# Patient Record
Sex: FEMALE | Race: WHITE | Employment: OTHER | URBAN - NONMETROPOLITAN AREA
[De-identification: names, ages, dates, MRNs, and addresses within clinical notes are randomized per-mention and may not be internally consistent; named-entity substitution may affect disease eponyms.]

---

## 2018-01-18 ENCOUNTER — APPOINTMENT (OUTPATIENT)
Dept: GENERAL RADIOLOGY | Age: 58
End: 2018-01-18
Payer: MEDICARE

## 2018-01-18 ENCOUNTER — HOSPITAL ENCOUNTER (EMERGENCY)
Age: 58
Discharge: HOME | End: 2018-01-18
Attending: EMERGENCY MEDICINE
Payer: MEDICARE

## 2018-01-18 VITALS
HEART RATE: 75 BPM | DIASTOLIC BLOOD PRESSURE: 84 MMHG | OXYGEN SATURATION: 96 % | SYSTOLIC BLOOD PRESSURE: 107 MMHG | RESPIRATION RATE: 11 BRPM | TEMPERATURE: 98.1 F

## 2018-01-18 DIAGNOSIS — K59.00 CONSTIPATION, UNSPECIFIED CONSTIPATION TYPE: ICD-10-CM

## 2018-01-18 DIAGNOSIS — N30.00 ACUTE CYSTITIS WITHOUT HEMATURIA: Primary | ICD-10-CM

## 2018-01-18 LAB
-: ABNORMAL
ALBUMIN SERPL-MCNC: 4.3 G/DL (ref 3.5–5.2)
ALBUMIN/GLOBULIN RATIO: 1.3 (ref 1–2.5)
ALP BLD-CCNC: 77 U/L (ref 35–104)
ALT SERPL-CCNC: 10 U/L (ref 5–33)
AMORPHOUS: ABNORMAL
ANION GAP SERPL CALCULATED.3IONS-SCNC: 14 MMOL/L (ref 9–17)
AST SERPL-CCNC: 12 U/L
BACTERIA: ABNORMAL
BILIRUB SERPL-MCNC: 0.19 MG/DL (ref 0.3–1.2)
BILIRUBIN URINE: NEGATIVE
BUN BLDV-MCNC: 33 MG/DL (ref 6–20)
BUN/CREAT BLD: 34 (ref 9–20)
CALCIUM SERPL-MCNC: 9.6 MG/DL (ref 8.6–10.4)
CASTS UA: ABNORMAL /LPF
CHLORIDE BLD-SCNC: 95 MMOL/L (ref 98–107)
CO2: 29 MMOL/L (ref 20–31)
COLOR: YELLOW
COMMENT UA: ABNORMAL
CREAT SERPL-MCNC: 0.98 MG/DL (ref 0.5–0.9)
CRYSTALS, UA: ABNORMAL /HPF
EKG ATRIAL RATE: 73 BPM
EKG P AXIS: 53 DEGREES
EKG P-R INTERVAL: 132 MS
EKG Q-T INTERVAL: 390 MS
EKG QRS DURATION: 90 MS
EKG QTC CALCULATION (BAZETT): 429 MS
EKG R AXIS: 13 DEGREES
EKG T AXIS: 36 DEGREES
EKG VENTRICULAR RATE: 73 BPM
EPITHELIAL CELLS UA: ABNORMAL /HPF (ref 0–25)
GFR AFRICAN AMERICAN: >60 ML/MIN
GFR NON-AFRICAN AMERICAN: 59 ML/MIN
GFR SERPL CREATININE-BSD FRML MDRD: ABNORMAL ML/MIN/{1.73_M2}
GFR SERPL CREATININE-BSD FRML MDRD: ABNORMAL ML/MIN/{1.73_M2}
GLUCOSE BLD-MCNC: 232 MG/DL (ref 70–99)
GLUCOSE URINE: NEGATIVE
HCT VFR BLD CALC: 42.4 % (ref 36–46)
HEMOGLOBIN: 14 G/DL (ref 12–16)
KETONES, URINE: NEGATIVE
LEUKOCYTE ESTERASE, URINE: ABNORMAL
MCH RBC QN AUTO: 29.1 PG (ref 26–34)
MCHC RBC AUTO-ENTMCNC: 33.1 G/DL (ref 31–37)
MCV RBC AUTO: 87.9 FL (ref 80–100)
MUCUS: ABNORMAL
NITRITE, URINE: POSITIVE
NRBC AUTOMATED: NORMAL PER 100 WBC
OTHER OBSERVATIONS UA: ABNORMAL
PDW BLD-RTO: 14.2 % (ref 12.1–15.2)
PH UA: 6 (ref 5–9)
PLATELET # BLD: 196 K/UL (ref 140–450)
PMV BLD AUTO: 10.9 FL (ref 6–12)
POTASSIUM SERPL-SCNC: 3.9 MMOL/L (ref 3.7–5.3)
PROTEIN UA: NEGATIVE
RBC # BLD: 4.82 M/UL (ref 4–5.2)
RBC UA: ABNORMAL /HPF (ref 0–2)
RENAL EPITHELIAL, UA: ABNORMAL /HPF
SODIUM BLD-SCNC: 138 MMOL/L (ref 135–144)
SPECIFIC GRAVITY UA: 1.01 (ref 1.01–1.02)
TOTAL PROTEIN: 7.5 G/DL (ref 6.4–8.3)
TRICHOMONAS: ABNORMAL
TROPONIN INTERP: NORMAL
TROPONIN T: <0.03 NG/ML
TURBIDITY: ABNORMAL
URINE HGB: NEGATIVE
UROBILINOGEN, URINE: NORMAL
WBC # BLD: 7.8 K/UL (ref 3.5–11)
WBC UA: ABNORMAL /HPF (ref 0–5)
YEAST: ABNORMAL

## 2018-01-18 PROCEDURE — 87186 SC STD MICRODIL/AGAR DIL: CPT

## 2018-01-18 PROCEDURE — 6370000000 HC RX 637 (ALT 250 FOR IP): Performed by: EMERGENCY MEDICINE

## 2018-01-18 PROCEDURE — 85027 COMPLETE CBC AUTOMATED: CPT

## 2018-01-18 PROCEDURE — 99284 EMERGENCY DEPT VISIT MOD MDM: CPT

## 2018-01-18 PROCEDURE — 93005 ELECTROCARDIOGRAM TRACING: CPT

## 2018-01-18 PROCEDURE — 74022 RADEX COMPL AQT ABD SERIES: CPT

## 2018-01-18 PROCEDURE — 87086 URINE CULTURE/COLONY COUNT: CPT

## 2018-01-18 PROCEDURE — 81001 URINALYSIS AUTO W/SCOPE: CPT

## 2018-01-18 PROCEDURE — 36415 COLL VENOUS BLD VENIPUNCTURE: CPT

## 2018-01-18 PROCEDURE — 80053 COMPREHEN METABOLIC PANEL: CPT

## 2018-01-18 PROCEDURE — 87077 CULTURE AEROBIC IDENTIFY: CPT

## 2018-01-18 PROCEDURE — 84484 ASSAY OF TROPONIN QUANT: CPT

## 2018-01-18 PROCEDURE — 96374 THER/PROPH/DIAG INJ IV PUSH: CPT

## 2018-01-18 PROCEDURE — 6360000002 HC RX W HCPCS: Performed by: EMERGENCY MEDICINE

## 2018-01-18 RX ORDER — CEPHALEXIN 500 MG/1
500 CAPSULE ORAL ONCE
Status: COMPLETED | OUTPATIENT
Start: 2018-01-18 | End: 2018-01-18

## 2018-01-18 RX ORDER — CEPHALEXIN 500 MG/1
500 CAPSULE ORAL 3 TIMES DAILY
Qty: 21 CAPSULE | Refills: 0 | Status: SHIPPED | OUTPATIENT
Start: 2018-01-18 | End: 2018-04-08 | Stop reason: ALTCHOICE

## 2018-01-18 RX ORDER — ONDANSETRON 2 MG/ML
4 INJECTION INTRAMUSCULAR; INTRAVENOUS ONCE
Status: COMPLETED | OUTPATIENT
Start: 2018-01-18 | End: 2018-01-18

## 2018-01-18 RX ADMIN — ONDANSETRON 4 MG: 2 INJECTION INTRAMUSCULAR; INTRAVENOUS at 06:59

## 2018-01-18 RX ADMIN — CEPHALEXIN 500 MG: 500 CAPSULE ORAL at 07:52

## 2018-01-18 ASSESSMENT — ENCOUNTER SYMPTOMS
RESPIRATORY NEGATIVE: 1
CONSTIPATION: 1
ABDOMINAL DISTENTION: 1

## 2018-01-18 ASSESSMENT — PAIN SCALES - GENERAL: PAINLEVEL_OUTOF10: 2

## 2018-01-19 LAB
CULTURE: ABNORMAL
CULTURE: ABNORMAL
Lab: ABNORMAL
Lab: ABNORMAL
ORGANISM: ABNORMAL
SPECIMEN DESCRIPTION: ABNORMAL
SPECIMEN DESCRIPTION: ABNORMAL
STATUS: ABNORMAL

## 2018-04-08 ENCOUNTER — HOSPITAL ENCOUNTER (EMERGENCY)
Age: 58
Discharge: ANOTHER HOSPITAL | End: 2018-04-08
Attending: EMERGENCY MEDICINE
Payer: MEDICARE

## 2018-04-08 ENCOUNTER — APPOINTMENT (OUTPATIENT)
Dept: GENERAL RADIOLOGY | Age: 58
End: 2018-04-08
Payer: MEDICARE

## 2018-04-08 ENCOUNTER — HOSPITAL ENCOUNTER (INPATIENT)
Age: 58
LOS: 1 days | Discharge: HOME | DRG: 313 | End: 2018-04-09
Attending: INTERNAL MEDICINE | Admitting: INTERNAL MEDICINE
Payer: MEDICARE

## 2018-04-08 VITALS
SYSTOLIC BLOOD PRESSURE: 180 MMHG | TEMPERATURE: 98.5 F | WEIGHT: 182 LBS | RESPIRATION RATE: 13 BRPM | BODY MASS INDEX: 30.29 KG/M2 | DIASTOLIC BLOOD PRESSURE: 98 MMHG | HEART RATE: 72 BPM | OXYGEN SATURATION: 96 %

## 2018-04-08 DIAGNOSIS — R07.9 CHEST PAIN, UNSPECIFIED TYPE: Primary | ICD-10-CM

## 2018-04-08 LAB
ABSOLUTE EOS #: 0.41 K/UL (ref 0–0.44)
ABSOLUTE IMMATURE GRANULOCYTE: <0.03 K/UL (ref 0–0.3)
ABSOLUTE LYMPH #: 0.84 K/UL (ref 1.1–3.7)
ABSOLUTE MONO #: 0.53 K/UL (ref 0.1–1.2)
ALBUMIN SERPL-MCNC: 3.9 G/DL (ref 3.5–5.2)
ALBUMIN/GLOBULIN RATIO: 1.1 (ref 1–2.5)
ALP BLD-CCNC: 85 U/L (ref 35–104)
ALT SERPL-CCNC: 11 U/L (ref 5–33)
ANION GAP SERPL CALCULATED.3IONS-SCNC: 11 MMOL/L (ref 9–17)
AST SERPL-CCNC: 11 U/L
BASOPHILS # BLD: 1 % (ref 0–2)
BASOPHILS ABSOLUTE: 0.06 K/UL (ref 0–0.2)
BILIRUB SERPL-MCNC: 0.18 MG/DL (ref 0.3–1.2)
BUN BLDV-MCNC: 27 MG/DL (ref 6–20)
BUN/CREAT BLD: 23 (ref 9–20)
CALCIUM SERPL-MCNC: 9.3 MG/DL (ref 8.6–10.4)
CHLORIDE BLD-SCNC: 100 MMOL/L (ref 98–107)
CO2: 27 MMOL/L (ref 20–31)
CREAT SERPL-MCNC: 1.16 MG/DL (ref 0.5–0.9)
DIFFERENTIAL TYPE: ABNORMAL
EOSINOPHILS RELATIVE PERCENT: 6 % (ref 1–4)
GFR AFRICAN AMERICAN: 59 ML/MIN
GFR NON-AFRICAN AMERICAN: 48 ML/MIN
GFR SERPL CREATININE-BSD FRML MDRD: ABNORMAL ML/MIN/{1.73_M2}
GFR SERPL CREATININE-BSD FRML MDRD: ABNORMAL ML/MIN/{1.73_M2}
GLUCOSE BLD-MCNC: 186 MG/DL (ref 70–99)
GLUCOSE BLD-MCNC: 195 MG/DL (ref 65–105)
GLUCOSE BLD-MCNC: 217 MG/DL (ref 65–105)
HCT VFR BLD CALC: 42.1 % (ref 36.3–47.1)
HEMOGLOBIN: 13.6 G/DL (ref 11.9–15.1)
IMMATURE GRANULOCYTES: 0 %
LYMPHOCYTES # BLD: 12 % (ref 24–43)
MCH RBC QN AUTO: 29.1 PG (ref 25.2–33.5)
MCHC RBC AUTO-ENTMCNC: 32.3 G/DL (ref 28.4–34.8)
MCV RBC AUTO: 90 FL (ref 82.6–102.9)
MONOCYTES # BLD: 7 % (ref 3–12)
NRBC AUTOMATED: 0 PER 100 WBC
PDW BLD-RTO: 13.7 % (ref 11.8–14.4)
PLATELET # BLD: 206 K/UL (ref 138–453)
PLATELET ESTIMATE: ABNORMAL
PMV BLD AUTO: 12.4 FL (ref 8.1–13.5)
POTASSIUM SERPL-SCNC: 4.4 MMOL/L (ref 3.7–5.3)
RBC # BLD: 4.68 M/UL (ref 3.95–5.11)
RBC # BLD: ABNORMAL 10*6/UL
SEG NEUTROPHILS: 74 % (ref 36–65)
SEGMENTED NEUTROPHILS ABSOLUTE COUNT: 5.33 K/UL (ref 1.5–8.1)
SODIUM BLD-SCNC: 138 MMOL/L (ref 135–144)
TOTAL PROTEIN: 7.4 G/DL (ref 6.4–8.3)
TROPONIN INTERP: NORMAL
TROPONIN T: <0.03 NG/ML
WBC # BLD: 7.2 K/UL (ref 3.5–11.3)
WBC # BLD: ABNORMAL 10*3/UL

## 2018-04-08 PROCEDURE — 85025 COMPLETE CBC W/AUTO DIFF WBC: CPT

## 2018-04-08 PROCEDURE — 6370000000 HC RX 637 (ALT 250 FOR IP): Performed by: INTERNAL MEDICINE

## 2018-04-08 PROCEDURE — 84484 ASSAY OF TROPONIN QUANT: CPT

## 2018-04-08 PROCEDURE — 1200000000 HC SEMI PRIVATE

## 2018-04-08 PROCEDURE — 36415 COLL VENOUS BLD VENIPUNCTURE: CPT

## 2018-04-08 PROCEDURE — 99285 EMERGENCY DEPT VISIT HI MDM: CPT

## 2018-04-08 PROCEDURE — 96374 THER/PROPH/DIAG INJ IV PUSH: CPT

## 2018-04-08 PROCEDURE — 80053 COMPREHEN METABOLIC PANEL: CPT

## 2018-04-08 PROCEDURE — 82947 ASSAY GLUCOSE BLOOD QUANT: CPT

## 2018-04-08 PROCEDURE — 71045 X-RAY EXAM CHEST 1 VIEW: CPT

## 2018-04-08 PROCEDURE — 99222 1ST HOSP IP/OBS MODERATE 55: CPT | Performed by: INTERNAL MEDICINE

## 2018-04-08 PROCEDURE — 93005 ELECTROCARDIOGRAM TRACING: CPT

## 2018-04-08 PROCEDURE — 96376 TX/PRO/DX INJ SAME DRUG ADON: CPT

## 2018-04-08 PROCEDURE — 6370000000 HC RX 637 (ALT 250 FOR IP): Performed by: EMERGENCY MEDICINE

## 2018-04-08 PROCEDURE — 96375 TX/PRO/DX INJ NEW DRUG ADDON: CPT

## 2018-04-08 PROCEDURE — 6360000002 HC RX W HCPCS: Performed by: EMERGENCY MEDICINE

## 2018-04-08 RX ORDER — MORPHINE SULFATE 4 MG/ML
4 INJECTION, SOLUTION INTRAMUSCULAR; INTRAVENOUS ONCE
Status: COMPLETED | OUTPATIENT
Start: 2018-04-08 | End: 2018-04-08

## 2018-04-08 RX ORDER — NITROGLYCERIN 0.4 MG/1
0.4 TABLET SUBLINGUAL EVERY 5 MIN PRN
Status: DISCONTINUED | OUTPATIENT
Start: 2018-04-08 | End: 2018-04-09 | Stop reason: HOSPADM

## 2018-04-08 RX ORDER — ORPHENADRINE CITRATE 30 MG/ML
60 INJECTION INTRAMUSCULAR; INTRAVENOUS ONCE
Status: COMPLETED | OUTPATIENT
Start: 2018-04-08 | End: 2018-04-08

## 2018-04-08 RX ORDER — ONDANSETRON 2 MG/ML
4 INJECTION INTRAMUSCULAR; INTRAVENOUS EVERY 6 HOURS PRN
Status: DISCONTINUED | OUTPATIENT
Start: 2018-04-08 | End: 2018-04-09 | Stop reason: HOSPADM

## 2018-04-08 RX ORDER — DOCUSATE SODIUM 100 MG/1
100 CAPSULE, LIQUID FILLED ORAL 2 TIMES DAILY
Status: DISCONTINUED | OUTPATIENT
Start: 2018-04-08 | End: 2018-04-09 | Stop reason: HOSPADM

## 2018-04-08 RX ORDER — NICOTINE POLACRILEX 4 MG
15 LOZENGE BUCCAL PRN
Status: DISCONTINUED | OUTPATIENT
Start: 2018-04-08 | End: 2018-04-09 | Stop reason: HOSPADM

## 2018-04-08 RX ORDER — POTASSIUM CHLORIDE 7.45 MG/ML
10 INJECTION INTRAVENOUS PRN
Status: DISCONTINUED | OUTPATIENT
Start: 2018-04-08 | End: 2018-04-09 | Stop reason: HOSPADM

## 2018-04-08 RX ORDER — POTASSIUM CHLORIDE 20 MEQ/1
40 TABLET, EXTENDED RELEASE ORAL PRN
Status: DISCONTINUED | OUTPATIENT
Start: 2018-04-08 | End: 2018-04-09 | Stop reason: HOSPADM

## 2018-04-08 RX ORDER — BISACODYL 10 MG
10 SUPPOSITORY, RECTAL RECTAL DAILY PRN
Status: DISCONTINUED | OUTPATIENT
Start: 2018-04-08 | End: 2018-04-09 | Stop reason: HOSPADM

## 2018-04-08 RX ORDER — ASPIRIN 325 MG
325 TABLET ORAL ONCE
Status: COMPLETED | OUTPATIENT
Start: 2018-04-08 | End: 2018-04-08

## 2018-04-08 RX ORDER — FUROSEMIDE 20 MG/1
20 TABLET ORAL
Status: DISCONTINUED | OUTPATIENT
Start: 2018-04-09 | End: 2018-04-09 | Stop reason: HOSPADM

## 2018-04-08 RX ORDER — MORPHINE SULFATE 2 MG/ML
2 INJECTION, SOLUTION INTRAMUSCULAR; INTRAVENOUS
Status: DISCONTINUED | OUTPATIENT
Start: 2018-04-08 | End: 2018-04-09 | Stop reason: HOSPADM

## 2018-04-08 RX ORDER — ATORVASTATIN CALCIUM 20 MG/1
20 TABLET, FILM COATED ORAL NIGHTLY
Status: DISCONTINUED | OUTPATIENT
Start: 2018-04-08 | End: 2018-04-09 | Stop reason: HOSPADM

## 2018-04-08 RX ORDER — SODIUM CHLORIDE 0.9 % (FLUSH) 0.9 %
10 SYRINGE (ML) INJECTION EVERY 12 HOURS SCHEDULED
Status: DISCONTINUED | OUTPATIENT
Start: 2018-04-08 | End: 2018-04-09 | Stop reason: HOSPADM

## 2018-04-08 RX ORDER — DEXTROSE MONOHYDRATE 50 MG/ML
100 INJECTION, SOLUTION INTRAVENOUS PRN
Status: DISCONTINUED | OUTPATIENT
Start: 2018-04-08 | End: 2018-04-09 | Stop reason: HOSPADM

## 2018-04-08 RX ORDER — ONDANSETRON 2 MG/ML
4 INJECTION INTRAMUSCULAR; INTRAVENOUS ONCE
Status: COMPLETED | OUTPATIENT
Start: 2018-04-08 | End: 2018-04-08

## 2018-04-08 RX ORDER — POTASSIUM CHLORIDE 20MEQ/15ML
40 LIQUID (ML) ORAL PRN
Status: DISCONTINUED | OUTPATIENT
Start: 2018-04-08 | End: 2018-04-09 | Stop reason: HOSPADM

## 2018-04-08 RX ORDER — HYDROCODONE BITARTRATE AND ACETAMINOPHEN 5; 325 MG/1; MG/1
1 TABLET ORAL EVERY 4 HOURS PRN
Status: DISCONTINUED | OUTPATIENT
Start: 2018-04-08 | End: 2018-04-09 | Stop reason: HOSPADM

## 2018-04-08 RX ORDER — DEXTROSE MONOHYDRATE 25 G/50ML
12.5 INJECTION, SOLUTION INTRAVENOUS PRN
Status: DISCONTINUED | OUTPATIENT
Start: 2018-04-08 | End: 2018-04-09 | Stop reason: HOSPADM

## 2018-04-08 RX ORDER — ACETAMINOPHEN 325 MG/1
650 TABLET ORAL EVERY 4 HOURS PRN
Status: DISCONTINUED | OUTPATIENT
Start: 2018-04-08 | End: 2018-04-09 | Stop reason: HOSPADM

## 2018-04-08 RX ORDER — SODIUM CHLORIDE 0.9 % (FLUSH) 0.9 %
10 SYRINGE (ML) INJECTION PRN
Status: DISCONTINUED | OUTPATIENT
Start: 2018-04-08 | End: 2018-04-09 | Stop reason: HOSPADM

## 2018-04-08 RX ORDER — BACLOFEN 10 MG/1
10 TABLET ORAL 4 TIMES DAILY PRN
Status: DISCONTINUED | OUTPATIENT
Start: 2018-04-08 | End: 2018-04-09 | Stop reason: HOSPADM

## 2018-04-08 RX ORDER — CYCLOBENZAPRINE HCL 10 MG
10 TABLET ORAL 3 TIMES DAILY PRN
Status: DISCONTINUED | OUTPATIENT
Start: 2018-04-08 | End: 2018-04-09 | Stop reason: HOSPADM

## 2018-04-08 RX ORDER — MAGNESIUM SULFATE 1 G/100ML
1 INJECTION INTRAVENOUS PRN
Status: DISCONTINUED | OUTPATIENT
Start: 2018-04-08 | End: 2018-04-09 | Stop reason: HOSPADM

## 2018-04-08 RX ORDER — MORPHINE SULFATE 4 MG/ML
4 INJECTION, SOLUTION INTRAMUSCULAR; INTRAVENOUS
Status: DISCONTINUED | OUTPATIENT
Start: 2018-04-08 | End: 2018-04-09 | Stop reason: HOSPADM

## 2018-04-08 RX ADMIN — DOCUSATE SODIUM 100 MG: 100 TABLET, FILM COATED ORAL at 20:55

## 2018-04-08 RX ADMIN — ONDANSETRON 4 MG: 2 INJECTION INTRAMUSCULAR; INTRAVENOUS at 08:56

## 2018-04-08 RX ADMIN — MORPHINE SULFATE 4 MG: 4 INJECTION, SOLUTION INTRAMUSCULAR; INTRAVENOUS at 08:57

## 2018-04-08 RX ADMIN — HYDROCODONE BITARTRATE AND ACETAMINOPHEN 1 TABLET: 5; 325 TABLET ORAL at 20:55

## 2018-04-08 RX ADMIN — INSULIN LISPRO 2 UNITS: 100 INJECTION, SOLUTION INTRAVENOUS; SUBCUTANEOUS at 17:21

## 2018-04-08 RX ADMIN — INSULIN LISPRO 1 UNITS: 100 INJECTION, SOLUTION INTRAVENOUS; SUBCUTANEOUS at 20:59

## 2018-04-08 RX ADMIN — MORPHINE SULFATE 4 MG: 4 INJECTION, SOLUTION INTRAMUSCULAR; INTRAVENOUS at 11:14

## 2018-04-08 RX ADMIN — ONDANSETRON 4 MG: 2 INJECTION INTRAMUSCULAR; INTRAVENOUS at 11:10

## 2018-04-08 RX ADMIN — ASPIRIN 325 MG: 325 TABLET, COATED ORAL at 09:10

## 2018-04-08 RX ADMIN — CYCLOBENZAPRINE 10 MG: 10 TABLET, FILM COATED ORAL at 18:49

## 2018-04-08 RX ADMIN — HYDROCODONE BITARTRATE AND ACETAMINOPHEN 1 TABLET: 5; 325 TABLET ORAL at 14:25

## 2018-04-08 RX ADMIN — ORPHENADRINE CITRATE 60 MG: 30 INJECTION INTRAMUSCULAR; INTRAVENOUS at 09:10

## 2018-04-08 RX ADMIN — NITROGLYCERIN 0.5 INCH: 20 OINTMENT TOPICAL at 09:10

## 2018-04-08 RX ADMIN — ATORVASTATIN CALCIUM 20 MG: 20 TABLET, FILM COATED ORAL at 20:55

## 2018-04-08 ASSESSMENT — PAIN SCALES - GENERAL
PAINLEVEL_OUTOF10: 4
PAINLEVEL_OUTOF10: 9
PAINLEVEL_OUTOF10: 10
PAINLEVEL_OUTOF10: 8
PAINLEVEL_OUTOF10: 10
PAINLEVEL_OUTOF10: 8
PAINLEVEL_OUTOF10: 10
PAINLEVEL_OUTOF10: 8
PAINLEVEL_OUTOF10: 7

## 2018-04-08 ASSESSMENT — PAIN DESCRIPTION - LOCATION
LOCATION: ARM;CHEST
LOCATION: ARM;CHEST

## 2018-04-08 ASSESSMENT — ENCOUNTER SYMPTOMS
EYES NEGATIVE: 1
ALLERGIC/IMMUNOLOGIC NEGATIVE: 1
CHEST TIGHTNESS: 1
GASTROINTESTINAL NEGATIVE: 1

## 2018-04-08 ASSESSMENT — PAIN DESCRIPTION - PAIN TYPE
TYPE: ACUTE PAIN
TYPE: ACUTE PAIN

## 2018-04-08 ASSESSMENT — PAIN DESCRIPTION - ORIENTATION
ORIENTATION: LEFT
ORIENTATION: LEFT

## 2018-04-08 ASSESSMENT — PAIN DESCRIPTION - DESCRIPTORS: DESCRIPTORS: DISCOMFORT;ACHING;SHARP

## 2018-04-08 ASSESSMENT — PAIN DESCRIPTION - FREQUENCY: FREQUENCY: CONTINUOUS

## 2018-04-09 VITALS
HEIGHT: 65 IN | DIASTOLIC BLOOD PRESSURE: 67 MMHG | HEART RATE: 81 BPM | SYSTOLIC BLOOD PRESSURE: 108 MMHG | OXYGEN SATURATION: 95 % | TEMPERATURE: 98.8 F | BODY MASS INDEX: 32.96 KG/M2 | RESPIRATION RATE: 18 BRPM | WEIGHT: 197.8 LBS

## 2018-04-09 LAB
ALBUMIN SERPL-MCNC: 3.6 G/DL (ref 3.5–5.2)
ALBUMIN/GLOBULIN RATIO: 1.2 (ref 1–2.5)
ALP BLD-CCNC: 72 U/L (ref 35–104)
ALT SERPL-CCNC: 9 U/L (ref 5–33)
ANION GAP SERPL CALCULATED.3IONS-SCNC: 12 MMOL/L (ref 9–17)
AST SERPL-CCNC: 9 U/L
BILIRUB SERPL-MCNC: 0.36 MG/DL (ref 0.3–1.2)
BUN BLDV-MCNC: 18 MG/DL (ref 6–20)
BUN/CREAT BLD: ABNORMAL (ref 9–20)
CALCIUM SERPL-MCNC: 9.1 MG/DL (ref 8.6–10.4)
CHLORIDE BLD-SCNC: 98 MMOL/L (ref 98–107)
CHOLESTEROL/HDL RATIO: 2.2
CHOLESTEROL: 215 MG/DL
CO2: 26 MMOL/L (ref 20–31)
CREAT SERPL-MCNC: 0.89 MG/DL (ref 0.5–0.9)
EKG ATRIAL RATE: 71 BPM
EKG ATRIAL RATE: 79 BPM
EKG P AXIS: 75 DEGREES
EKG P AXIS: 77 DEGREES
EKG P-R INTERVAL: 130 MS
EKG P-R INTERVAL: 140 MS
EKG Q-T INTERVAL: 370 MS
EKG Q-T INTERVAL: 388 MS
EKG QRS DURATION: 78 MS
EKG QRS DURATION: 78 MS
EKG QTC CALCULATION (BAZETT): 421 MS
EKG QTC CALCULATION (BAZETT): 424 MS
EKG R AXIS: 0 DEGREES
EKG R AXIS: 7 DEGREES
EKG T AXIS: 13 DEGREES
EKG T AXIS: 27 DEGREES
EKG VENTRICULAR RATE: 71 BPM
EKG VENTRICULAR RATE: 79 BPM
GFR AFRICAN AMERICAN: >60 ML/MIN
GFR NON-AFRICAN AMERICAN: >60 ML/MIN
GFR SERPL CREATININE-BSD FRML MDRD: ABNORMAL ML/MIN/{1.73_M2}
GFR SERPL CREATININE-BSD FRML MDRD: ABNORMAL ML/MIN/{1.73_M2}
GLUCOSE BLD-MCNC: 140 MG/DL (ref 65–105)
GLUCOSE BLD-MCNC: 166 MG/DL (ref 65–105)
GLUCOSE BLD-MCNC: 176 MG/DL (ref 65–105)
GLUCOSE BLD-MCNC: 180 MG/DL (ref 70–99)
HCT VFR BLD CALC: 39.1 % (ref 36.3–47.1)
HDLC SERPL-MCNC: 100 MG/DL
HEMOGLOBIN: 12.1 G/DL (ref 11.9–15.1)
LDL CHOLESTEROL: 95 MG/DL (ref 0–130)
MAGNESIUM: 1.8 MG/DL (ref 1.6–2.6)
MCH RBC QN AUTO: 28.7 PG (ref 25.2–33.5)
MCHC RBC AUTO-ENTMCNC: 30.9 G/DL (ref 28.4–34.8)
MCV RBC AUTO: 92.9 FL (ref 82.6–102.9)
NRBC AUTOMATED: 0 PER 100 WBC
PDW BLD-RTO: 14 % (ref 11.8–14.4)
PLATELET # BLD: 206 K/UL (ref 138–453)
PMV BLD AUTO: 12.7 FL (ref 8.1–13.5)
POTASSIUM SERPL-SCNC: 4.5 MMOL/L (ref 3.7–5.3)
RBC # BLD: 4.21 M/UL (ref 3.95–5.11)
SODIUM BLD-SCNC: 136 MMOL/L (ref 135–144)
T3 FREE: 1.58 PG/ML (ref 2.02–4.43)
THYROXINE, FREE: 0.92 NG/DL (ref 0.93–1.7)
TOTAL PROTEIN: 6.6 G/DL (ref 6.4–8.3)
TRIGL SERPL-MCNC: 99 MG/DL
TSH SERPL DL<=0.05 MIU/L-ACNC: 25.36 MIU/L (ref 0.3–5)
VLDLC SERPL CALC-MCNC: ABNORMAL MG/DL (ref 1–30)
WBC # BLD: 7 K/UL (ref 3.5–11.3)

## 2018-04-09 PROCEDURE — 6370000000 HC RX 637 (ALT 250 FOR IP): Performed by: INTERNAL MEDICINE

## 2018-04-09 PROCEDURE — 84481 FREE ASSAY (FT-3): CPT

## 2018-04-09 PROCEDURE — 80061 LIPID PANEL: CPT

## 2018-04-09 PROCEDURE — 82947 ASSAY GLUCOSE BLOOD QUANT: CPT

## 2018-04-09 PROCEDURE — 83735 ASSAY OF MAGNESIUM: CPT

## 2018-04-09 PROCEDURE — 2580000003 HC RX 258: Performed by: INTERNAL MEDICINE

## 2018-04-09 PROCEDURE — 84443 ASSAY THYROID STIM HORMONE: CPT

## 2018-04-09 PROCEDURE — 99221 1ST HOSP IP/OBS SF/LOW 40: CPT | Performed by: PSYCHIATRY & NEUROLOGY

## 2018-04-09 PROCEDURE — 85027 COMPLETE CBC AUTOMATED: CPT

## 2018-04-09 PROCEDURE — 80053 COMPREHEN METABOLIC PANEL: CPT

## 2018-04-09 PROCEDURE — 94762 N-INVAS EAR/PLS OXIMTRY CONT: CPT

## 2018-04-09 PROCEDURE — 36415 COLL VENOUS BLD VENIPUNCTURE: CPT

## 2018-04-09 PROCEDURE — 99232 SBSQ HOSP IP/OBS MODERATE 35: CPT | Performed by: INTERNAL MEDICINE

## 2018-04-09 PROCEDURE — G0378 HOSPITAL OBSERVATION PER HR: HCPCS

## 2018-04-09 PROCEDURE — 84439 ASSAY OF FREE THYROXINE: CPT

## 2018-04-09 PROCEDURE — 6360000002 HC RX W HCPCS: Performed by: INTERNAL MEDICINE

## 2018-04-09 RX ORDER — LEVOTHYROXINE SODIUM 0.1 MG/1
200 TABLET ORAL DAILY
Status: DISCONTINUED | OUTPATIENT
Start: 2018-04-10 | End: 2018-04-09 | Stop reason: HOSPADM

## 2018-04-09 RX ORDER — LEVOTHYROXINE SODIUM 0.2 MG/1
200 TABLET ORAL DAILY
Qty: 30 TABLET | Refills: 1 | Status: ON HOLD | OUTPATIENT
Start: 2018-04-10 | End: 2022-05-29 | Stop reason: SDUPTHER

## 2018-04-09 RX ORDER — CYCLOBENZAPRINE HCL 10 MG
10 TABLET ORAL 3 TIMES DAILY PRN
Qty: 30 TABLET | Refills: 0 | Status: SHIPPED | OUTPATIENT
Start: 2018-04-09 | End: 2018-04-19

## 2018-04-09 RX ADMIN — INSULIN LISPRO 1 UNITS: 100 INJECTION, SOLUTION INTRAVENOUS; SUBCUTANEOUS at 09:52

## 2018-04-09 RX ADMIN — ASPIRIN 325 MG: 325 TABLET, COATED ORAL at 09:51

## 2018-04-09 RX ADMIN — ENOXAPARIN SODIUM 40 MG: 40 INJECTION SUBCUTANEOUS at 09:50

## 2018-04-09 RX ADMIN — CYCLOBENZAPRINE 10 MG: 10 TABLET, FILM COATED ORAL at 15:26

## 2018-04-09 RX ADMIN — DOCUSATE SODIUM 100 MG: 100 TABLET, FILM COATED ORAL at 09:30

## 2018-04-09 RX ADMIN — Medication 10 ML: at 09:54

## 2018-04-09 RX ADMIN — ATORVASTATIN CALCIUM 20 MG: 20 TABLET, FILM COATED ORAL at 20:39

## 2018-04-09 RX ADMIN — FUROSEMIDE 20 MG: 20 TABLET ORAL at 13:43

## 2018-04-09 RX ADMIN — HYDROCODONE BITARTRATE AND ACETAMINOPHEN 1 TABLET: 5; 325 TABLET ORAL at 06:15

## 2018-04-09 RX ADMIN — MAGNESIUM GLUCONATE 500 MG ORAL TABLET 1000 MG: 500 TABLET ORAL at 09:51

## 2018-04-09 RX ADMIN — LEVOTHYROXINE SODIUM 175 MCG: 125 TABLET ORAL at 09:50

## 2018-04-09 RX ADMIN — CYCLOBENZAPRINE 10 MG: 10 TABLET, FILM COATED ORAL at 20:40

## 2018-04-09 RX ADMIN — CYCLOBENZAPRINE 10 MG: 10 TABLET, FILM COATED ORAL at 06:15

## 2018-04-09 RX ADMIN — INSULIN LISPRO 1 UNITS: 100 INJECTION, SOLUTION INTRAVENOUS; SUBCUTANEOUS at 13:43

## 2018-04-09 ASSESSMENT — PAIN SCALES - GENERAL
PAINLEVEL_OUTOF10: 6
PAINLEVEL_OUTOF10: 0

## 2018-06-22 ENCOUNTER — APPOINTMENT (OUTPATIENT)
Dept: GENERAL RADIOLOGY | Age: 58
End: 2018-06-22
Payer: MEDICARE

## 2018-06-22 ENCOUNTER — HOSPITAL ENCOUNTER (OUTPATIENT)
Dept: NON INVASIVE DIAGNOSTICS | Age: 58
Discharge: HOME | End: 2018-06-22
Payer: MEDICARE

## 2018-06-22 ENCOUNTER — APPOINTMENT (OUTPATIENT)
Dept: CT IMAGING | Age: 58
End: 2018-06-22
Payer: MEDICARE

## 2018-06-22 ENCOUNTER — HOSPITAL ENCOUNTER (EMERGENCY)
Age: 58
Discharge: HOME | End: 2018-06-22
Attending: EMERGENCY MEDICINE
Payer: MEDICARE

## 2018-06-22 VITALS
WEIGHT: 190 LBS | SYSTOLIC BLOOD PRESSURE: 128 MMHG | HEART RATE: 78 BPM | TEMPERATURE: 98.1 F | OXYGEN SATURATION: 97 % | BODY MASS INDEX: 31.62 KG/M2 | DIASTOLIC BLOOD PRESSURE: 72 MMHG | RESPIRATION RATE: 16 BRPM

## 2018-06-22 DIAGNOSIS — R07.89 OTHER CHEST PAIN: Primary | ICD-10-CM

## 2018-06-22 DIAGNOSIS — R07.89 ATYPICAL CHEST PAIN: Primary | ICD-10-CM

## 2018-06-22 DIAGNOSIS — G35 MS (MULTIPLE SCLEROSIS) (HCC): ICD-10-CM

## 2018-06-22 LAB
% CKMB: 1.5 % (ref 0–3)
% CKMB: 2.8 % (ref 0–3)
-: ABNORMAL
ABSOLUTE EOS #: 0.28 K/UL (ref 0–0.44)
ABSOLUTE IMMATURE GRANULOCYTE: <0.03 K/UL (ref 0–0.3)
ABSOLUTE LYMPH #: 0.82 K/UL (ref 1.1–3.7)
ABSOLUTE MONO #: 0.53 K/UL (ref 0.1–1.2)
ALBUMIN SERPL-MCNC: 3.9 G/DL (ref 3.5–5.2)
ALBUMIN/GLOBULIN RATIO: 1.2 (ref 1–2.5)
ALP BLD-CCNC: 90 U/L (ref 35–104)
ALT SERPL-CCNC: 9 U/L (ref 5–33)
AMORPHOUS: ABNORMAL
ANION GAP SERPL CALCULATED.3IONS-SCNC: 13 MMOL/L (ref 9–17)
AST SERPL-CCNC: 10 U/L
BACTERIA: ABNORMAL
BASOPHILS # BLD: 1 % (ref 0–2)
BASOPHILS ABSOLUTE: 0.05 K/UL (ref 0–0.2)
BILIRUB SERPL-MCNC: 0.2 MG/DL (ref 0.3–1.2)
BILIRUBIN URINE: NEGATIVE
BNP INTERPRETATION: NORMAL
BUN BLDV-MCNC: 22 MG/DL (ref 6–20)
BUN/CREAT BLD: 29 (ref 9–20)
C-REACTIVE PROTEIN: 16.9 MG/L (ref 0–5)
CALCIUM SERPL-MCNC: 9.4 MG/DL (ref 8.6–10.4)
CASTS UA: ABNORMAL /LPF
CHLORIDE BLD-SCNC: 100 MMOL/L (ref 98–107)
CK MB: 1.9 NG/ML
CK MB: 2 NG/ML
CKMB INTERPRETATION: NORMAL
CKMB INTERPRETATION: NORMAL
CO2: 28 MMOL/L (ref 20–31)
COLOR: YELLOW
COMMENT UA: ABNORMAL
CREAT SERPL-MCNC: 0.75 MG/DL (ref 0.5–0.9)
CRYSTALS, UA: ABNORMAL /HPF
D-DIMER QUANTITATIVE: 1.31 MG/L FEU (ref 0.19–0.5)
DIFFERENTIAL TYPE: ABNORMAL
EOSINOPHILS RELATIVE PERCENT: 4 % (ref 1–4)
EPITHELIAL CELLS UA: ABNORMAL /HPF (ref 0–25)
GFR AFRICAN AMERICAN: >60 ML/MIN
GFR NON-AFRICAN AMERICAN: >60 ML/MIN
GFR SERPL CREATININE-BSD FRML MDRD: ABNORMAL ML/MIN/{1.73_M2}
GFR SERPL CREATININE-BSD FRML MDRD: ABNORMAL ML/MIN/{1.73_M2}
GLUCOSE BLD-MCNC: 187 MG/DL (ref 70–99)
GLUCOSE URINE: NEGATIVE
HCT VFR BLD CALC: 38 % (ref 36.3–47.1)
HEMOGLOBIN: 12.5 G/DL (ref 11.9–15.1)
IMMATURE GRANULOCYTES: 0 %
INR BLD: 1 (ref 0.9–1.2)
KETONES, URINE: ABNORMAL
LEUKOCYTE ESTERASE, URINE: NEGATIVE
LV EF: 60 %
LVEF MODALITY: NORMAL
LYMPHOCYTES # BLD: 11 % (ref 24–43)
MCH RBC QN AUTO: 29 PG (ref 25.2–33.5)
MCHC RBC AUTO-ENTMCNC: 32.9 G/DL (ref 28.4–34.8)
MCV RBC AUTO: 88.2 FL (ref 82.6–102.9)
MONOCYTES # BLD: 7 % (ref 3–12)
MUCUS: ABNORMAL
NITRITE, URINE: NEGATIVE
NRBC AUTOMATED: 0 PER 100 WBC
OTHER OBSERVATIONS UA: ABNORMAL
PARTIAL THROMBOPLASTIN TIME: 29.6 SEC (ref 23.2–34.4)
PDW BLD-RTO: 13.2 % (ref 11.8–14.4)
PH UA: 6 (ref 5–9)
PLATELET # BLD: 256 K/UL (ref 138–453)
PLATELET ESTIMATE: ABNORMAL
PMV BLD AUTO: 12.1 FL (ref 8.1–13.5)
POTASSIUM SERPL-SCNC: 4.3 MMOL/L (ref 3.7–5.3)
PRO-BNP: 65 PG/ML
PROTEIN UA: NEGATIVE
PROTHROMBIN TIME: 10.4 SEC (ref 9.7–12.2)
RBC # BLD: 4.31 M/UL (ref 3.95–5.11)
RBC # BLD: ABNORMAL 10*6/UL
RBC UA: ABNORMAL /HPF (ref 0–2)
RENAL EPITHELIAL, UA: ABNORMAL /HPF
SEDIMENTATION RATE, ERYTHROCYTE: 47 MM (ref 0–20)
SEG NEUTROPHILS: 77 % (ref 36–65)
SEGMENTED NEUTROPHILS ABSOLUTE COUNT: 5.78 K/UL (ref 1.5–8.1)
SODIUM BLD-SCNC: 141 MMOL/L (ref 135–144)
SPECIFIC GRAVITY UA: 1.01 (ref 1.01–1.02)
TOTAL CK: 126 U/L (ref 26–192)
TOTAL CK: 72 U/L (ref 26–192)
TOTAL PROTEIN: 7.2 G/DL (ref 6.4–8.3)
TRICHOMONAS: ABNORMAL
TROPONIN INTERP: NORMAL
TROPONIN INTERP: NORMAL
TROPONIN T: <0.03 NG/ML
TROPONIN T: <0.03 NG/ML
TURBIDITY: CLEAR
URINE HGB: NEGATIVE
UROBILINOGEN, URINE: NORMAL
WBC # BLD: 7.5 K/UL (ref 3.5–11.3)
WBC # BLD: ABNORMAL 10*3/UL
WBC UA: ABNORMAL /HPF (ref 0–5)
YEAST: ABNORMAL

## 2018-06-22 PROCEDURE — 85610 PROTHROMBIN TIME: CPT

## 2018-06-22 PROCEDURE — 71275 CT ANGIOGRAPHY CHEST: CPT

## 2018-06-22 PROCEDURE — 83880 ASSAY OF NATRIURETIC PEPTIDE: CPT

## 2018-06-22 PROCEDURE — 84484 ASSAY OF TROPONIN QUANT: CPT

## 2018-06-22 PROCEDURE — 86140 C-REACTIVE PROTEIN: CPT

## 2018-06-22 PROCEDURE — 80053 COMPREHEN METABOLIC PANEL: CPT

## 2018-06-22 PROCEDURE — 85651 RBC SED RATE NONAUTOMATED: CPT

## 2018-06-22 PROCEDURE — 96375 TX/PRO/DX INJ NEW DRUG ADDON: CPT

## 2018-06-22 PROCEDURE — 82553 CREATINE MB FRACTION: CPT

## 2018-06-22 PROCEDURE — 82550 ASSAY OF CK (CPK): CPT

## 2018-06-22 PROCEDURE — 99285 EMERGENCY DEPT VISIT HI MDM: CPT

## 2018-06-22 PROCEDURE — 85379 FIBRIN DEGRADATION QUANT: CPT

## 2018-06-22 PROCEDURE — 6370000000 HC RX 637 (ALT 250 FOR IP): Performed by: EMERGENCY MEDICINE

## 2018-06-22 PROCEDURE — 36415 COLL VENOUS BLD VENIPUNCTURE: CPT

## 2018-06-22 PROCEDURE — 6360000004 HC RX CONTRAST MEDICATION: Performed by: EMERGENCY MEDICINE

## 2018-06-22 PROCEDURE — 81001 URINALYSIS AUTO W/SCOPE: CPT

## 2018-06-22 PROCEDURE — 85730 THROMBOPLASTIN TIME PARTIAL: CPT

## 2018-06-22 PROCEDURE — 96374 THER/PROPH/DIAG INJ IV PUSH: CPT

## 2018-06-22 PROCEDURE — 99204 OFFICE O/P NEW MOD 45 MIN: CPT | Performed by: INTERNAL MEDICINE

## 2018-06-22 PROCEDURE — 93225 XTRNL ECG REC<48 HRS REC: CPT

## 2018-06-22 PROCEDURE — 93306 TTE W/DOPPLER COMPLETE: CPT

## 2018-06-22 PROCEDURE — 71045 X-RAY EXAM CHEST 1 VIEW: CPT

## 2018-06-22 PROCEDURE — 93005 ELECTROCARDIOGRAM TRACING: CPT

## 2018-06-22 PROCEDURE — 6360000002 HC RX W HCPCS: Performed by: EMERGENCY MEDICINE

## 2018-06-22 PROCEDURE — 85025 COMPLETE CBC W/AUTO DIFF WBC: CPT

## 2018-06-22 RX ORDER — HYDROCODONE BITARTRATE AND ACETAMINOPHEN 5; 325 MG/1; MG/1
1 TABLET ORAL EVERY 4 HOURS PRN
Qty: 10 TABLET | Refills: 0 | Status: SHIPPED | OUTPATIENT
Start: 2018-06-22 | End: 2018-06-26 | Stop reason: ALTCHOICE

## 2018-06-22 RX ORDER — PREDNISONE 20 MG/1
20 TABLET ORAL DAILY
Qty: 7 TABLET | Refills: 0 | Status: ON HOLD | OUTPATIENT
Start: 2018-06-22 | End: 2018-06-28 | Stop reason: HOSPADM

## 2018-06-22 RX ORDER — METOPROLOL SUCCINATE 25 MG/1
12.5 TABLET, EXTENDED RELEASE ORAL DAILY
Qty: 30 TABLET | Refills: 3 | Status: SHIPPED | OUTPATIENT
Start: 2018-06-22 | End: 2018-12-06

## 2018-06-22 RX ORDER — NITROGLYCERIN 0.4 MG/1
0.4 TABLET SUBLINGUAL EVERY 5 MIN PRN
Status: COMPLETED | OUTPATIENT
Start: 2018-06-22 | End: 2018-06-22

## 2018-06-22 RX ORDER — FENTANYL CITRATE 50 UG/ML
50 INJECTION, SOLUTION INTRAMUSCULAR; INTRAVENOUS ONCE
Status: COMPLETED | OUTPATIENT
Start: 2018-06-22 | End: 2018-06-22

## 2018-06-22 RX ORDER — MORPHINE SULFATE 2 MG/ML
2 INJECTION, SOLUTION INTRAMUSCULAR; INTRAVENOUS ONCE
Status: COMPLETED | OUTPATIENT
Start: 2018-06-22 | End: 2018-06-22

## 2018-06-22 RX ORDER — ONDANSETRON 2 MG/ML
4 INJECTION INTRAMUSCULAR; INTRAVENOUS ONCE
Status: COMPLETED | OUTPATIENT
Start: 2018-06-22 | End: 2018-06-22

## 2018-06-22 RX ORDER — ATORVASTATIN CALCIUM 10 MG/1
20 TABLET, FILM COATED ORAL DAILY
Qty: 30 TABLET | Refills: 3 | Status: ON HOLD | OUTPATIENT
Start: 2018-06-22 | End: 2018-06-28 | Stop reason: HOSPADM

## 2018-06-22 RX ADMIN — NITROGLYCERIN 0.4 MG: 0.4 TABLET SUBLINGUAL at 10:55

## 2018-06-22 RX ADMIN — NITROGLYCERIN 0.4 MG: 0.4 TABLET SUBLINGUAL at 11:05

## 2018-06-22 RX ADMIN — FENTANYL CITRATE 50 MCG: 50 INJECTION INTRAMUSCULAR; INTRAVENOUS at 14:38

## 2018-06-22 RX ADMIN — ONDANSETRON 4 MG: 2 INJECTION INTRAMUSCULAR; INTRAVENOUS at 10:51

## 2018-06-22 RX ADMIN — NITROGLYCERIN 0.4 MG: 0.4 TABLET SUBLINGUAL at 11:00

## 2018-06-22 RX ADMIN — IOPAMIDOL 100 ML: 612 INJECTION, SOLUTION INTRAVENOUS at 14:27

## 2018-06-22 RX ADMIN — TICAGRELOR 180 MG: 90 TABLET ORAL at 10:50

## 2018-06-22 RX ADMIN — MORPHINE SULFATE 2 MG: 2 INJECTION, SOLUTION INTRAMUSCULAR; INTRAVENOUS at 10:52

## 2018-06-22 ASSESSMENT — PAIN DESCRIPTION - ORIENTATION
ORIENTATION: LEFT
ORIENTATION: LEFT

## 2018-06-22 ASSESSMENT — ENCOUNTER SYMPTOMS
SHORTNESS OF BREATH: 0
COUGH: 0
DIARRHEA: 0
VOMITING: 0
CONSTIPATION: 0
ABDOMINAL PAIN: 0
NAUSEA: 0
FACIAL SWELLING: 0
COLOR CHANGE: 0
WHEEZING: 0
ABDOMINAL DISTENTION: 0
TROUBLE SWALLOWING: 0

## 2018-06-22 ASSESSMENT — PAIN DESCRIPTION - LOCATION
LOCATION: ARM
LOCATION: ARM

## 2018-06-22 ASSESSMENT — PAIN SCALES - GENERAL
PAINLEVEL_OUTOF10: 3
PAINLEVEL_OUTOF10: 8
PAINLEVEL_OUTOF10: 0
PAINLEVEL_OUTOF10: 3
PAINLEVEL_OUTOF10: 4

## 2018-06-22 ASSESSMENT — PAIN DESCRIPTION - PAIN TYPE
TYPE: ACUTE PAIN
TYPE: ACUTE PAIN

## 2018-06-22 ASSESSMENT — PAIN DESCRIPTION - DESCRIPTORS: DESCRIPTORS: ACHING;DISCOMFORT

## 2018-06-22 ASSESSMENT — PAIN DESCRIPTION - FREQUENCY: FREQUENCY: CONTINUOUS

## 2018-06-24 LAB
EKG ATRIAL RATE: 78 BPM
EKG ATRIAL RATE: 84 BPM
EKG P AXIS: 52 DEGREES
EKG P AXIS: 53 DEGREES
EKG P-R INTERVAL: 128 MS
EKG P-R INTERVAL: 132 MS
EKG Q-T INTERVAL: 368 MS
EKG Q-T INTERVAL: 384 MS
EKG QRS DURATION: 88 MS
EKG QRS DURATION: 90 MS
EKG QTC CALCULATION (BAZETT): 434 MS
EKG QTC CALCULATION (BAZETT): 437 MS
EKG R AXIS: 14 DEGREES
EKG R AXIS: 18 DEGREES
EKG T AXIS: 40 DEGREES
EKG T AXIS: 40 DEGREES
EKG VENTRICULAR RATE: 78 BPM
EKG VENTRICULAR RATE: 84 BPM

## 2018-06-26 ENCOUNTER — HOSPITAL ENCOUNTER (INPATIENT)
Age: 58
LOS: 1 days | Discharge: HOME | DRG: 287 | End: 2018-06-28
Attending: FAMILY MEDICINE | Admitting: FAMILY MEDICINE
Payer: MEDICARE

## 2018-06-26 ENCOUNTER — APPOINTMENT (OUTPATIENT)
Dept: GENERAL RADIOLOGY | Age: 58
DRG: 287 | End: 2018-06-26
Payer: MEDICARE

## 2018-06-26 DIAGNOSIS — R00.0 TACHYCARDIA: Primary | ICD-10-CM

## 2018-06-26 DIAGNOSIS — R07.89 ATYPICAL CHEST PAIN: ICD-10-CM

## 2018-06-26 DIAGNOSIS — R55 NEAR SYNCOPE: ICD-10-CM

## 2018-06-26 DIAGNOSIS — E08.21 DIABETES DUE TO UNDERLYING CONDITION W DIABETIC NEPHROPATHY (HCC): ICD-10-CM

## 2018-06-26 LAB
ALBUMIN SERPL-MCNC: 4.4 G/DL (ref 3.5–5.2)
ALBUMIN/GLOBULIN RATIO: 1 (ref 1–2.5)
ALP BLD-CCNC: 119 U/L (ref 35–104)
ALT SERPL-CCNC: 12 U/L (ref 5–33)
ANION GAP SERPL CALCULATED.3IONS-SCNC: 14 MMOL/L (ref 9–17)
AST SERPL-CCNC: 10 U/L
BILIRUB SERPL-MCNC: 0.28 MG/DL (ref 0.3–1.2)
BUN BLDV-MCNC: 24 MG/DL (ref 6–20)
BUN/CREAT BLD: 26 (ref 9–20)
CALCIUM SERPL-MCNC: 9.8 MG/DL (ref 8.6–10.4)
CHLORIDE BLD-SCNC: 91 MMOL/L (ref 98–107)
CHP ED QC CHECK: YES
CO2: 31 MMOL/L (ref 20–31)
CREAT SERPL-MCNC: 0.94 MG/DL (ref 0.5–0.9)
GFR AFRICAN AMERICAN: >60 ML/MIN
GFR NON-AFRICAN AMERICAN: >60 ML/MIN
GFR SERPL CREATININE-BSD FRML MDRD: ABNORMAL ML/MIN/{1.73_M2}
GFR SERPL CREATININE-BSD FRML MDRD: ABNORMAL ML/MIN/{1.73_M2}
GLUCOSE BLD-MCNC: 204 MG/DL (ref 74–100)
GLUCOSE BLD-MCNC: 321 MG/DL
GLUCOSE BLD-MCNC: 321 MG/DL (ref 74–100)
GLUCOSE BLD-MCNC: 417 MG/DL (ref 70–99)
HCT VFR BLD CALC: 43.8 % (ref 36.3–47.1)
HEMOGLOBIN: 14.1 G/DL (ref 11.9–15.1)
MCH RBC QN AUTO: 28.5 PG (ref 25.2–33.5)
MCHC RBC AUTO-ENTMCNC: 32.2 G/DL (ref 28.4–34.8)
MCV RBC AUTO: 88.5 FL (ref 82.6–102.9)
NRBC AUTOMATED: 0 PER 100 WBC
PDW BLD-RTO: 13.3 % (ref 11.8–14.4)
PLATELET # BLD: 280 K/UL (ref 138–453)
PMV BLD AUTO: 12.1 FL (ref 8.1–13.5)
POTASSIUM SERPL-SCNC: 4.5 MMOL/L (ref 3.7–5.3)
RBC # BLD: 4.95 M/UL (ref 3.95–5.11)
SODIUM BLD-SCNC: 136 MMOL/L (ref 135–144)
THYROXINE, FREE: 1.65 NG/DL (ref 0.93–1.7)
TOTAL PROTEIN: 8.8 G/DL (ref 6.4–8.3)
TROPONIN INTERP: NORMAL
TROPONIN INTERP: NORMAL
TROPONIN T: <0.03 NG/ML
TROPONIN T: <0.03 NG/ML
TSH SERPL DL<=0.05 MIU/L-ACNC: 1.93 MIU/L (ref 0.3–5)
WBC # BLD: 8.8 K/UL (ref 3.5–11.3)

## 2018-06-26 PROCEDURE — 6370000000 HC RX 637 (ALT 250 FOR IP): Performed by: FAMILY MEDICINE

## 2018-06-26 PROCEDURE — 96372 THER/PROPH/DIAG INJ SC/IM: CPT

## 2018-06-26 PROCEDURE — 84439 ASSAY OF FREE THYROXINE: CPT

## 2018-06-26 PROCEDURE — 2500000003 HC RX 250 WO HCPCS

## 2018-06-26 PROCEDURE — 71045 X-RAY EXAM CHEST 1 VIEW: CPT

## 2018-06-26 PROCEDURE — 99285 EMERGENCY DEPT VISIT HI MDM: CPT

## 2018-06-26 PROCEDURE — 36415 COLL VENOUS BLD VENIPUNCTURE: CPT

## 2018-06-26 PROCEDURE — 84484 ASSAY OF TROPONIN QUANT: CPT

## 2018-06-26 PROCEDURE — 85027 COMPLETE CBC AUTOMATED: CPT

## 2018-06-26 PROCEDURE — 80053 COMPREHEN METABOLIC PANEL: CPT

## 2018-06-26 PROCEDURE — 84443 ASSAY THYROID STIM HORMONE: CPT

## 2018-06-26 PROCEDURE — 6370000000 HC RX 637 (ALT 250 FOR IP): Performed by: PHYSICIAN ASSISTANT

## 2018-06-26 PROCEDURE — G0378 HOSPITAL OBSERVATION PER HR: HCPCS

## 2018-06-26 PROCEDURE — 94760 N-INVAS EAR/PLS OXIMETRY 1: CPT

## 2018-06-26 PROCEDURE — 93005 ELECTROCARDIOGRAM TRACING: CPT

## 2018-06-26 PROCEDURE — 82947 ASSAY GLUCOSE BLOOD QUANT: CPT

## 2018-06-26 PROCEDURE — 6360000002 HC RX W HCPCS

## 2018-06-26 PROCEDURE — 2580000003 HC RX 258: Performed by: FAMILY MEDICINE

## 2018-06-26 PROCEDURE — 2580000003 HC RX 258: Performed by: PHYSICIAN ASSISTANT

## 2018-06-26 RX ORDER — ASPIRIN 81 MG/1
324 TABLET, CHEWABLE ORAL ONCE
Status: COMPLETED | OUTPATIENT
Start: 2018-06-26 | End: 2018-06-26

## 2018-06-26 RX ORDER — 0.9 % SODIUM CHLORIDE 0.9 %
1000 INTRAVENOUS SOLUTION INTRAVENOUS ONCE
Status: COMPLETED | OUTPATIENT
Start: 2018-06-26 | End: 2018-06-26

## 2018-06-26 RX ORDER — METOPROLOL SUCCINATE 25 MG/1
12.5 TABLET, EXTENDED RELEASE ORAL DAILY
Status: DISCONTINUED | OUTPATIENT
Start: 2018-06-27 | End: 2018-06-27

## 2018-06-26 RX ORDER — DEXTROSE MONOHYDRATE 50 MG/ML
100 INJECTION, SOLUTION INTRAVENOUS PRN
Status: DISCONTINUED | OUTPATIENT
Start: 2018-06-26 | End: 2018-06-28 | Stop reason: HOSPADM

## 2018-06-26 RX ORDER — PREDNISONE 20 MG/1
20 TABLET ORAL DAILY
Status: DISCONTINUED | OUTPATIENT
Start: 2018-06-27 | End: 2018-06-27

## 2018-06-26 RX ORDER — FUROSEMIDE 20 MG/1
20 TABLET ORAL DAILY
Status: DISCONTINUED | OUTPATIENT
Start: 2018-06-27 | End: 2018-06-27

## 2018-06-26 RX ORDER — ATORVASTATIN CALCIUM 20 MG/1
20 TABLET, FILM COATED ORAL DAILY
Status: DISCONTINUED | OUTPATIENT
Start: 2018-06-27 | End: 2018-06-27

## 2018-06-26 RX ORDER — ASPIRIN 81 MG/1
324 TABLET, CHEWABLE ORAL ONCE
Status: DISCONTINUED | OUTPATIENT
Start: 2018-06-26 | End: 2018-06-27

## 2018-06-26 RX ORDER — ONDANSETRON 2 MG/ML
4 INJECTION INTRAMUSCULAR; INTRAVENOUS EVERY 6 HOURS PRN
Status: DISCONTINUED | OUTPATIENT
Start: 2018-06-26 | End: 2018-06-28 | Stop reason: HOSPADM

## 2018-06-26 RX ORDER — DEXTROSE MONOHYDRATE 25 G/50ML
12.5 INJECTION, SOLUTION INTRAVENOUS PRN
Status: DISCONTINUED | OUTPATIENT
Start: 2018-06-26 | End: 2018-06-28 | Stop reason: HOSPADM

## 2018-06-26 RX ORDER — HYDROCODONE BITARTRATE AND ACETAMINOPHEN 5; 325 MG/1; MG/1
1 TABLET ORAL ONCE
Status: COMPLETED | OUTPATIENT
Start: 2018-06-26 | End: 2018-06-26

## 2018-06-26 RX ORDER — LEVOTHYROXINE SODIUM 0.1 MG/1
200 TABLET ORAL DAILY
Status: DISCONTINUED | OUTPATIENT
Start: 2018-06-27 | End: 2018-06-27

## 2018-06-26 RX ORDER — NICOTINE POLACRILEX 4 MG
15 LOZENGE BUCCAL PRN
Status: DISCONTINUED | OUTPATIENT
Start: 2018-06-26 | End: 2018-06-28 | Stop reason: HOSPADM

## 2018-06-26 RX ORDER — SODIUM CHLORIDE 0.9 % (FLUSH) 0.9 %
10 SYRINGE (ML) INJECTION PRN
Status: DISCONTINUED | OUTPATIENT
Start: 2018-06-26 | End: 2018-06-28

## 2018-06-26 RX ORDER — SODIUM CHLORIDE 0.9 % (FLUSH) 0.9 %
10 SYRINGE (ML) INJECTION EVERY 12 HOURS SCHEDULED
Status: DISCONTINUED | OUTPATIENT
Start: 2018-06-26 | End: 2018-06-28

## 2018-06-26 RX ORDER — BACLOFEN 10 MG/1
10 TABLET ORAL 4 TIMES DAILY PRN
Status: DISCONTINUED | OUTPATIENT
Start: 2018-06-26 | End: 2018-06-27

## 2018-06-26 RX ORDER — ACETAMINOPHEN 325 MG/1
650 TABLET ORAL EVERY 4 HOURS PRN
Status: DISCONTINUED | OUTPATIENT
Start: 2018-06-26 | End: 2018-06-28

## 2018-06-26 RX ORDER — CYCLOBENZAPRINE HCL 10 MG
10 TABLET ORAL ONCE
Status: COMPLETED | OUTPATIENT
Start: 2018-06-26 | End: 2018-06-26

## 2018-06-26 RX ADMIN — INSULIN LISPRO 5 UNITS: 100 INJECTION, SOLUTION INTRAVENOUS; SUBCUTANEOUS at 16:00

## 2018-06-26 RX ADMIN — ASPIRIN 81 MG 324 MG: 81 TABLET ORAL at 15:15

## 2018-06-26 RX ADMIN — HYDROCODONE BITARTRATE AND ACETAMINOPHEN 1 TABLET: 5; 325 TABLET ORAL at 20:13

## 2018-06-26 RX ADMIN — CYCLOBENZAPRINE 10 MG: 10 TABLET, FILM COATED ORAL at 16:29

## 2018-06-26 RX ADMIN — Medication 10 ML: at 22:46

## 2018-06-26 RX ADMIN — INSULIN LISPRO 2 UNITS: 100 INJECTION, SOLUTION INTRAVENOUS; SUBCUTANEOUS at 22:44

## 2018-06-26 RX ADMIN — SODIUM CHLORIDE 1000 ML: 9 INJECTION, SOLUTION INTRAVENOUS at 16:04

## 2018-06-26 RX ADMIN — BACLOFEN 10 MG: 10 TABLET ORAL at 23:04

## 2018-06-26 ASSESSMENT — PAIN DESCRIPTION - FREQUENCY: FREQUENCY: CONTINUOUS

## 2018-06-26 ASSESSMENT — ENCOUNTER SYMPTOMS
NAUSEA: 1
CHEST TIGHTNESS: 0
WHEEZING: 0
VOMITING: 0
EYE DISCHARGE: 0
SORE THROAT: 0
DIARRHEA: 0
COUGH: 0
ABDOMINAL PAIN: 0
EYE REDNESS: 0
RHINORRHEA: 0
SHORTNESS OF BREATH: 0
BLOOD IN STOOL: 0
BACK PAIN: 0
CONSTIPATION: 0

## 2018-06-26 ASSESSMENT — PAIN SCALES - GENERAL
PAINLEVEL_OUTOF10: 9
PAINLEVEL_OUTOF10: 9

## 2018-06-26 ASSESSMENT — PAIN DESCRIPTION - ORIENTATION: ORIENTATION: MID

## 2018-06-26 ASSESSMENT — PAIN DESCRIPTION - PAIN TYPE: TYPE: ACUTE PAIN

## 2018-06-26 ASSESSMENT — PAIN DESCRIPTION - LOCATION: LOCATION: HEAD

## 2018-06-26 ASSESSMENT — PAIN DESCRIPTION - DESCRIPTORS: DESCRIPTORS: SHARP

## 2018-06-26 NOTE — ED PROVIDER NOTES
[facility] ED  eMERGENCY dEPARTMENT eNCOUnter      Pt Name: Letitia Murry  MRN: [id number]  [place] 1960  Date of evaluation: 6/26/2018  Provider: Gunjan Husain Dr       Chief Complaint   Patient presents with    Tachycardia     pt states her HR was in the 120 and got nauseated. HISTORY OF PRESENT ILLNESS   (Location/Symptom, Timing/Onset, Context/Setting, Quality, Duration, Modifying Factors, Severity)  Note limiting factors. Letitia Murry is a 66 y.o. female who presents to the emergency department  Via EMS secondary to tachycardia onset at home just prior to arrival.  Associated complaints include nausea and general malaise. Patient reports that she does have a history of SVT in the past and this felt like an episode of SVT. She reports that she was seen in here just a few days ago and actually was sent home on a Holter monitor which she turned and yesterday. States she is actually scheduled for a cardiac stress test tomorrow. She denies any chest pain or shortness of breath. Denies any difficulty breathing. Reports she does have a history of MS in the past.  She denies any history of thyroid disease. She denies any vomiting. Reports the palpitations feeling is completely resolved at this point. She was given 4 baby aspirin in route to ER. She otherwise denies abdominal pain no other complaints at this time. HPI    Nursing Notes were reviewed. REVIEW OF SYSTEMS    (2-9 systems for level 4, 10 or more for level 5)     Review of Systems   Constitutional: Negative for chills, diaphoresis and fever. HENT: Negative for congestion, ear pain, rhinorrhea and sore throat. Eyes: Negative for discharge, redness and visual disturbance. Respiratory: Negative for cough, chest tightness, shortness of breath and wheezing. Cardiovascular: Positive for palpitations. Negative for chest pain. Gastrointestinal: Positive for nausea.  Negative for tablet by mouth 2 times daily. LEVOTHYROXINE (SYNTHROID) 200 MCG TABLET    Take 1 tablet by mouth Daily    MAGNESIUM 250 MG TABS    Take 1,000 mg by mouth every morning     METFORMIN (GLUCOPHAGE) 1000 MG TABLET    Take 1 tablet by mouth daily (with breakfast). METOPROLOL SUCCINATE (TOPROL XL) 25 MG EXTENDED RELEASE TABLET    Take 0.5 tablets by mouth daily    OXYGEN    2 lpm @ nasal cannula, continuous, both portable and stationary systems. PREDNISONE (DELTASONE) 20 MG TABLET    Take 1 tablet by mouth daily for 7 days       ALLERGIES     Bee venom; Erythromycin; and Janumet [sitagliptin-metformin hcl er]    FAMILY HISTORY       Family History   Problem Relation Age of Onset    Diabetes Mother     High Cholesterol Mother     Hypertension Mother     Heart Disease Father     Hypertension Father     Other Father         abdominal aneurysm    Mult Sclerosis Sister     Diabetes Maternal Grandmother     Diabetes Maternal Grandfather     Colon Cancer Paternal Grandmother     Other Paternal Grandfather         brain aneurysm    Colon Polyps Other         Aunts with colon polyps          SOCIAL HISTORY       Social History     Social History    Marital status:      Spouse name: N/A    Number of children: N/A    Years of education: N/A     Occupational History          not working due to medical problems     Social History Main Topics    Smoking status: Never Smoker    Smokeless tobacco: Never Used    Alcohol use Yes      Comment: occ    Drug use: No    Sexual activity: Yes     Partners: Male     Other Topics Concern    None     Social History Narrative    None       SCREENINGS             PHYSICAL EXAM    (up to 7 for level 4, 8 or more for level 5)     ED Triage Vitals [06/26/18 1457]   BP Temp Temp Source Pulse Resp SpO2 Height Weight   (!) 144/77 98.3 °F (36.8 °C) Tympanic 91 19 97 % -- --       Physical Exam   Constitutional: She is oriented to person, place, and time. She appears well-developed and well-nourished. No distress. HENT:   Head: Normocephalic and atraumatic. Right Ear: External ear normal.   Left Ear: External ear normal.   Mouth/Throat: Oropharynx is clear and moist. No oropharyngeal exudate. Eyes: Conjunctivae and EOM are normal. Pupils are equal, round, and reactive to light. Right eye exhibits no discharge. Left eye exhibits no discharge. No scleral icterus. Neck: Normal range of motion. Neck supple. No tracheal deviation present. Cardiovascular: Normal rate, regular rhythm and intact distal pulses. Exam reveals no gallop and no friction rub. No murmur heard. Pulmonary/Chest: Effort normal and breath sounds normal. No stridor. No respiratory distress. She has no wheezes. She has no rales. Abdominal: Soft. Bowel sounds are normal. She exhibits no distension. There is no tenderness. There is no rebound and no guarding. Musculoskeletal: Normal range of motion. She exhibits no edema, tenderness or deformity. Neurological: She is alert and oriented to person, place, and time. No cranial nerve deficit. Coordination normal.   Skin: Skin is warm and dry. No rash noted. She is not diaphoretic. No erythema. Psychiatric: She has a normal mood and affect. Her behavior is normal.   Nursing note and vitals reviewed. DIAGNOSTIC RESULTS     EKG: All EKG's are interpreted by the Emergency Department Physician who either signs or Co-signs this chart in the absence of a cardiologist.  Initial EKG showing normal sinus rhythm at a rate of 80 normal axis normal interval.  Slight T-wave inversion in V2 likely lead position. Reviewed with cardiology. Repeat EKG showing a normal sinus rhythm at a rate of 71. With normal axis normal interval.  No acute ST-T wave changes.     RADIOLOGY:   Non-plain film images such as CT, Ultrasound and MRI are read by the radiologist. Plain radiographic images are visualized and preliminarily interpreted by the emergency that we are going to repeat troponin and EKG. Her glucose is trending down after insulin given. Again I think is all likely due to her steroid use recently. She will be given medication for muscle spasm in her neck. I called and spoke with  came to the ER to discuss this patient with me. She her Holter monitor has not yet been reviewed as it is sent to [facility] for review. Discussed with him and he agrees with plan to admit overnight monitor and she will be stressed tomorrow. I called and spoke with [facility], hospitalist on-call. We discussed this patient's workup presentation and plan. He agrees to admit for further evaluation with consult to cardiology. Patient agrees with plan to admit. Her questions have been answered. Procedures    FINAL IMPRESSION      1. Tachycardia    2. Near syncope          DISPOSITION/PLAN   DISPOSITION Decision To Admit 06/26/2018 06:22:25 PM      PATIENT REFERRED TO:  No follow-up provider specified. DISCHARGE MEDICATIONS:  New Prescriptions    No medications on file              Summation      Patient Course:      ED Medications administered this visit:    Medications   aspirin chewable tablet 324 mg (324 mg Oral Given 6/26/18 1515)   insulin lispro (HUMALOG) injection vial 5 Units (5 Units Subcutaneous Given 6/26/18 1600)   0.9 % sodium chloride bolus (0 mLs Intravenous Stopped 6/26/18 1705)   cyclobenzaprine (FLEXERIL) tablet 10 mg (10 mg Oral Given 6/26/18 1629)       New Prescriptions from this visit:    New Prescriptions    No medications on file       Follow-up:  No follow-up provider specified. Final Impression:   1. Tachycardia    2.  Near syncope               (Please note that portions of this note were completed with a voice recognition program.  Efforts were made to edit the dictations but occasionally words are mis-transcribed.)            Halley Castro PA-C  06/26/18 1914

## 2018-06-27 ENCOUNTER — APPOINTMENT (OUTPATIENT)
Dept: NON INVASIVE DIAGNOSTICS | Age: 58
DRG: 287 | End: 2018-06-27
Payer: MEDICARE

## 2018-06-27 PROBLEM — I24.9 ACS (ACUTE CORONARY SYNDROME) (HCC): Status: ACTIVE | Noted: 2018-06-27

## 2018-06-27 LAB
ABSOLUTE EOS #: 0.24 K/UL (ref 0–0.44)
ABSOLUTE IMMATURE GRANULOCYTE: <0.03 K/UL (ref 0–0.3)
ABSOLUTE LYMPH #: 1.62 K/UL (ref 1.1–3.7)
ABSOLUTE MONO #: 0.68 K/UL (ref 0.1–1.2)
BASOPHILS # BLD: 1 % (ref 0–2)
BASOPHILS ABSOLUTE: 0.06 K/UL (ref 0–0.2)
BNP INTERPRETATION: NORMAL
DIFFERENTIAL TYPE: ABNORMAL
EKG ATRIAL RATE: 71 BPM
EKG ATRIAL RATE: 73 BPM
EKG ATRIAL RATE: 80 BPM
EKG P AXIS: 47 DEGREES
EKG P AXIS: 51 DEGREES
EKG P AXIS: 58 DEGREES
EKG P-R INTERVAL: 126 MS
EKG P-R INTERVAL: 130 MS
EKG P-R INTERVAL: 136 MS
EKG Q-T INTERVAL: 374 MS
EKG Q-T INTERVAL: 386 MS
EKG Q-T INTERVAL: 390 MS
EKG QRS DURATION: 80 MS
EKG QRS DURATION: 84 MS
EKG QRS DURATION: 92 MS
EKG QTC CALCULATION (BAZETT): 419 MS
EKG QTC CALCULATION (BAZETT): 429 MS
EKG QTC CALCULATION (BAZETT): 431 MS
EKG R AXIS: 18 DEGREES
EKG R AXIS: 18 DEGREES
EKG R AXIS: 19 DEGREES
EKG T AXIS: 30 DEGREES
EKG T AXIS: 35 DEGREES
EKG T AXIS: 49 DEGREES
EKG VENTRICULAR RATE: 71 BPM
EKG VENTRICULAR RATE: 73 BPM
EKG VENTRICULAR RATE: 80 BPM
EOSINOPHILS RELATIVE PERCENT: 3 % (ref 1–4)
GLUCOSE BLD-MCNC: 210 MG/DL (ref 74–100)
GLUCOSE BLD-MCNC: 241 MG/DL (ref 74–100)
GLUCOSE BLD-MCNC: 244 MG/DL (ref 74–100)
GLUCOSE BLD-MCNC: 329 MG/DL (ref 74–100)
HCT VFR BLD CALC: 38.5 % (ref 36.3–47.1)
HEMOGLOBIN: 12.1 G/DL (ref 11.9–15.1)
IMMATURE GRANULOCYTES: 0 %
LYMPHOCYTES # BLD: 23 % (ref 24–43)
MCH RBC QN AUTO: 28.3 PG (ref 25.2–33.5)
MCHC RBC AUTO-ENTMCNC: 31.4 G/DL (ref 28.4–34.8)
MCV RBC AUTO: 90.2 FL (ref 82.6–102.9)
MONOCYTES # BLD: 10 % (ref 3–12)
NRBC AUTOMATED: 0 PER 100 WBC
PDW BLD-RTO: 13.4 % (ref 11.8–14.4)
PLATELET # BLD: 267 K/UL (ref 138–453)
PLATELET ESTIMATE: ABNORMAL
PMV BLD AUTO: 11.8 FL (ref 8.1–13.5)
PRO-BNP: 214 PG/ML
RBC # BLD: 4.27 M/UL (ref 3.95–5.11)
RBC # BLD: ABNORMAL 10*6/UL
SEG NEUTROPHILS: 63 % (ref 36–65)
SEGMENTED NEUTROPHILS ABSOLUTE COUNT: 4.52 K/UL (ref 1.5–8.1)
WBC # BLD: 7.1 K/UL (ref 3.5–11.3)
WBC # BLD: ABNORMAL 10*3/UL

## 2018-06-27 PROCEDURE — 36415 COLL VENOUS BLD VENIPUNCTURE: CPT

## 2018-06-27 PROCEDURE — 85025 COMPLETE CBC W/AUTO DIFF WBC: CPT

## 2018-06-27 PROCEDURE — G0378 HOSPITAL OBSERVATION PER HR: HCPCS

## 2018-06-27 PROCEDURE — 96372 THER/PROPH/DIAG INJ SC/IM: CPT

## 2018-06-27 PROCEDURE — 83880 ASSAY OF NATRIURETIC PEPTIDE: CPT

## 2018-06-27 PROCEDURE — A9500 TC99M SESTAMIBI: HCPCS | Performed by: INTERNAL MEDICINE

## 2018-06-27 PROCEDURE — 3430000000 HC RX DIAGNOSTIC RADIOPHARMACEUTICAL: Performed by: FAMILY MEDICINE

## 2018-06-27 PROCEDURE — 78452 HT MUSCLE IMAGE SPECT MULT: CPT

## 2018-06-27 PROCEDURE — 6370000000 HC RX 637 (ALT 250 FOR IP): Performed by: FAMILY MEDICINE

## 2018-06-27 PROCEDURE — 82947 ASSAY GLUCOSE BLOOD QUANT: CPT

## 2018-06-27 PROCEDURE — 6360000002 HC RX W HCPCS: Performed by: FAMILY MEDICINE

## 2018-06-27 PROCEDURE — A9500 TC99M SESTAMIBI: HCPCS | Performed by: FAMILY MEDICINE

## 2018-06-27 PROCEDURE — 93017 CV STRESS TEST TRACING ONLY: CPT

## 2018-06-27 PROCEDURE — 1200000000 HC SEMI PRIVATE

## 2018-06-27 PROCEDURE — 93005 ELECTROCARDIOGRAM TRACING: CPT

## 2018-06-27 PROCEDURE — 2580000003 HC RX 258: Performed by: FAMILY MEDICINE

## 2018-06-27 PROCEDURE — 3430000000 HC RX DIAGNOSTIC RADIOPHARMACEUTICAL: Performed by: INTERNAL MEDICINE

## 2018-06-27 PROCEDURE — 6360000002 HC RX W HCPCS: Performed by: INTERNAL MEDICINE

## 2018-06-27 PROCEDURE — 99222 1ST HOSP IP/OBS MODERATE 55: CPT | Performed by: INTERNAL MEDICINE

## 2018-06-27 RX ORDER — ATORVASTATIN CALCIUM 20 MG/1
20 TABLET, FILM COATED ORAL DAILY
Status: DISCONTINUED | OUTPATIENT
Start: 2018-06-28 | End: 2018-06-28

## 2018-06-27 RX ORDER — ASPIRIN 325 MG
325 TABLET ORAL ONCE
Status: DISCONTINUED | OUTPATIENT
Start: 2018-06-27 | End: 2018-06-27

## 2018-06-27 RX ORDER — ATORVASTATIN CALCIUM 20 MG/1
20 TABLET, FILM COATED ORAL DAILY
Status: DISCONTINUED | OUTPATIENT
Start: 2018-06-27 | End: 2018-06-27

## 2018-06-27 RX ORDER — FUROSEMIDE 20 MG/1
20 TABLET ORAL DAILY
Status: DISCONTINUED | OUTPATIENT
Start: 2018-06-27 | End: 2018-06-27

## 2018-06-27 RX ORDER — ASPIRIN 325 MG
325 TABLET ORAL DAILY
Status: DISCONTINUED | OUTPATIENT
Start: 2018-06-27 | End: 2018-06-27

## 2018-06-27 RX ORDER — METOPROLOL SUCCINATE 25 MG/1
12.5 TABLET, EXTENDED RELEASE ORAL DAILY
Status: DISCONTINUED | OUTPATIENT
Start: 2018-06-27 | End: 2018-06-27

## 2018-06-27 RX ORDER — BACLOFEN 10 MG/1
10 TABLET ORAL 4 TIMES DAILY PRN
Status: DISCONTINUED | OUTPATIENT
Start: 2018-06-27 | End: 2018-06-28 | Stop reason: HOSPADM

## 2018-06-27 RX ORDER — METOPROLOL SUCCINATE 25 MG/1
12.5 TABLET, EXTENDED RELEASE ORAL DAILY
Status: DISCONTINUED | OUTPATIENT
Start: 2018-06-28 | End: 2018-06-28 | Stop reason: HOSPADM

## 2018-06-27 RX ORDER — LEVOTHYROXINE SODIUM 0.2 MG/1
200 TABLET ORAL DAILY
Status: DISCONTINUED | OUTPATIENT
Start: 2018-06-27 | End: 2018-06-27

## 2018-06-27 RX ORDER — ASPIRIN 325 MG
325 TABLET ORAL DAILY
Status: DISCONTINUED | OUTPATIENT
Start: 2018-06-28 | End: 2018-06-28 | Stop reason: HOSPADM

## 2018-06-27 RX ORDER — FUROSEMIDE 20 MG/1
20 TABLET ORAL DAILY
Status: DISCONTINUED | OUTPATIENT
Start: 2018-06-28 | End: 2018-06-28

## 2018-06-27 RX ORDER — LEVOTHYROXINE SODIUM 0.1 MG/1
200 TABLET ORAL DAILY
Status: DISCONTINUED | OUTPATIENT
Start: 2018-06-28 | End: 2018-06-28 | Stop reason: HOSPADM

## 2018-06-27 RX ORDER — PREDNISONE 20 MG/1
20 TABLET ORAL DAILY
Status: DISCONTINUED | OUTPATIENT
Start: 2018-06-27 | End: 2018-06-28 | Stop reason: HOSPADM

## 2018-06-27 RX ADMIN — INSULIN LISPRO 4 UNITS: 100 INJECTION, SOLUTION INTRAVENOUS; SUBCUTANEOUS at 17:23

## 2018-06-27 RX ADMIN — LEVOTHYROXINE SODIUM 200 MCG: 0.2 TABLET ORAL at 08:16

## 2018-06-27 RX ADMIN — Medication 30 MILLICURIE: at 10:45

## 2018-06-27 RX ADMIN — METOPROLOL SUCCINATE 12.5 MG: 25 TABLET, EXTENDED RELEASE ORAL at 08:15

## 2018-06-27 RX ADMIN — BACLOFEN 10 MG: 10 TABLET ORAL at 04:11

## 2018-06-27 RX ADMIN — FUROSEMIDE 20 MG: 20 TABLET ORAL at 08:15

## 2018-06-27 RX ADMIN — INSULIN LISPRO 8 UNITS: 100 INJECTION, SOLUTION INTRAVENOUS; SUBCUTANEOUS at 12:24

## 2018-06-27 RX ADMIN — BACLOFEN 10 MG: 10 TABLET ORAL at 08:16

## 2018-06-27 RX ADMIN — PREDNISONE 20 MG: 20 TABLET ORAL at 08:16

## 2018-06-27 RX ADMIN — BACLOFEN 10 MG: 10 TABLET ORAL at 13:58

## 2018-06-27 RX ADMIN — ACETAMINOPHEN 650 MG: 325 TABLET ORAL at 00:13

## 2018-06-27 RX ADMIN — INSULIN LISPRO 4 UNITS: 100 INJECTION, SOLUTION INTRAVENOUS; SUBCUTANEOUS at 08:24

## 2018-06-27 RX ADMIN — ATORVASTATIN CALCIUM 20 MG: 20 TABLET, FILM COATED ORAL at 08:16

## 2018-06-27 RX ADMIN — INSULIN LISPRO 2 UNITS: 100 INJECTION, SOLUTION INTRAVENOUS; SUBCUTANEOUS at 21:52

## 2018-06-27 RX ADMIN — Medication 325 MG: at 08:16

## 2018-06-27 RX ADMIN — Medication 10 ML: at 08:28

## 2018-06-27 RX ADMIN — Medication 10 MILLICURIE: at 09:14

## 2018-06-27 RX ADMIN — Medication 10 ML: at 21:52

## 2018-06-27 RX ADMIN — BACLOFEN 10 MG: 10 TABLET ORAL at 20:28

## 2018-06-27 RX ADMIN — ENOXAPARIN SODIUM 40 MG: 40 INJECTION SUBCUTANEOUS at 08:28

## 2018-06-27 RX ADMIN — REGADENOSON 0.4 MG: 0.08 INJECTION, SOLUTION INTRAVENOUS at 10:40

## 2018-06-27 ASSESSMENT — PAIN SCALES - GENERAL
PAINLEVEL_OUTOF10: 6
PAINLEVEL_OUTOF10: 9

## 2018-06-27 ASSESSMENT — PAIN DESCRIPTION - LOCATION
LOCATION: HEAD
LOCATION: HEAD

## 2018-06-27 ASSESSMENT — PAIN DESCRIPTION - ORIENTATION: ORIENTATION: MID

## 2018-06-27 ASSESSMENT — PAIN DESCRIPTION - PAIN TYPE
TYPE: ACUTE PAIN
TYPE: ACUTE PAIN

## 2018-06-27 NOTE — PLAN OF CARE
Problem: Nutrition  Goal: Optimal nutrition therapy  Outcome: Ongoing  Nutrition Problem: Predicted suboptimal energy intake  Intervention: Food and/or Nutrient Delivery: Start oral diet  Nutritional Goals: PO >75% meals   Heart healthy additions to \"ketogenic diet\" at home

## 2018-06-27 NOTE — H&P
Hospital Medicine  History and Physical    Patient:  Tiarra Salcedo  MRN: [id number]    Chief Complaint:  Left shoulder pain, tachycardia    History Obtained From:  patient, electronic medical record    PCP: Katie Gonzalez DO    History of Present Illness: The patient is a 66 y.o. female who presents with tachycardia, nausea, malaise and left shoulder pain. H/o MS. H/o chronic left shoulder pain. History of SVT stated that she felt like it was an episode of SVT. She was in the ER a few days prior and was sent home on a Holter monitor. She was seen by Dr. Abigail Sanders. She was scheduled for cardiac stress. She denied any chest pain or shortness of breath. She denied any vomiting, nausea, fever, chills. Reported that her palpitation were gone. She was given 4 baby aspirin in route to ER. Troponins negative. Admitted for possible ACS.       Past Medical History:        Diagnosis Date    Alveolar hypoventilation     Dermatitis herpetiformis     GERD (gastroesophageal reflux disease)     Gout     Hearing loss     Hyperlipidemia     Hypertension     Hypothyroidism     Morbid obesity ([address]     Multiple sclerosis (HCC)     Neuropathy (HCC)     MINH (obstructive sleep apnea)     intolerant of cpap    PAD (peripheral artery disease) (HCC)     SVT (supraventricular tachycardia) (HCC)     Type II or unspecified type diabetes mellitus without mention of complication, not stated as uncontrolled        Past Surgical History:        Procedure Laterality Date    CARPAL TUNNEL RELEASE      left    CERVICAL DISC SURGERY       SECTION          DILATION AND CURETTAGE OF UTERUS      HYSTERECTOMY  2006    cervical dysplasia       Family History:   Family History   Problem Relation Age of Onset    Diabetes Mother     High Cholesterol Mother     Hypertension Mother     Heart Disease Father     Hypertension Father     Other Father         abdominal aneurysm    Mult Sclerosis Sister     Diabetes Maternal lungs as needed 2 lpm @ nasal cannula, continuous, both portable and stationary systems. 5/17/12   Chong Human, DO       Review of Systems:  Constitutional:negative  for fevers, and negative for chills. Eyes: negative for visual disturbance   ENT: negative for sore throat, negative nasal congestion, and negative for earache  Respiratory: negative for shortness of breath, negative for cough, and negative for wheezing  Cardiovascular: negative for chest pain, positive for palpitations, and negative for syncope  Gastrointestinal: negative for abdominal pain, negative for nausea,negative for vomiting, negative for diarrhea, negative for constipation, and negative for hematochezia or melena  Genitourinary: negative for dysuria, negative for urinary urgency, negative for urinary frequency, and negative for hematuria  Skin: negative for skin rash, and negative for skin lesions  Neurological: negative for unilateral weakness, numbness or tingling.     Physical Exam:    Vitals:   Temp: 97 °F (36.1 °C)  BP: 138/78  Resp: 16  Pulse: 68  SpO2: 96 %  24HR INTAKE/OUTPUT:      Intake/Output Summary (Last 24 hours) at 06/27/18 1533  Last data filed at 06/26/18 2246   Gross per 24 hour   Intake               10 ml   Output                0 ml   Net               10 ml       Exam:  GEN:  alert and oriented to person, place and time, well-developed and well-nourished, in no acute distress  EYES: PERRL  NECK: normal, supple  PULM: clear to auscultation bilaterally- no wheezes, rales or rhonchi, normal air movement, no respiratory distress  COR: regular rate & rhythm and no murmurs  ABD:  soft, non-tender, non-distended, normal bowel sounds, no masses or organomegaly  EXT:   no cyanosis, clubbing or edema present    NEURO: follows commands, GRULLON, no deficits  SKIN:  no rashes or significant lesions  -----------------------------------------------------------------  Diagnostic Data:   Lab Results   Component Value Date    WBC 7.1 06/27/2018    HGB 12.1 06/27/2018     06/27/2018       Lab Results   Component Value Date    BUN 24 (H) 06/26/2018    CREATININE 0.94 (H) 06/26/2018     06/26/2018    K 4.5 06/26/2018    CALCIUM 9.8 06/26/2018    CL 91 (L) 06/26/2018    CO2 31 06/26/2018    LABGLOM >60 06/26/2018       Lab Results   Component Value Date    WBCUA 0 TO 2 06/22/2018    RBCUA 0 TO 2 06/22/2018    EPITHUA 0 TO 2 06/22/2018    LEUKOCYTESUR NEGATIVE 06/22/2018    SPECGRAV 1.010 06/22/2018    GLUCOSEU NEGATIVE 06/22/2018    KETUA TRACE (A) 06/22/2018    PROTEINU NEGATIVE 06/22/2018    HGBUR NEGATIVE 06/22/2018    CASTUA NOT REPORTED 06/22/2018    CRYSTUA NOT REPORTED 06/22/2018    BACTERIA NOT REPORTED 06/22/2018    YEAST NOT REPORTED 06/22/2018       Lab Results   Component Value Date    MYOGLOBIN 45 04/22/2015    TROPONINT <0.03 06/26/2018    CKTOTAL 126 06/22/2018    CKMB 1.9 06/22/2018    PROBNP 214 06/27/2018       Xr Chest Portable    Result Date: 6/26/2018  REPORT: Portable AP radiograph of the chest obtained at 1459  hours INDICATION: Tachycardia FINDINGS:  Compared to 6/22/2018. The lungs are well expanded and clear bilaterally. No focal consolidation, pleural effusion or pneumothorax seen. Normal cardiac and mediastinal silhouettes. No free intraperitoneal air. Final report electronically signed by Leopoldo Hines on 6/26/2018 3:41 PM    Normal chest        Assessment:    Active Problems:    Chest pain    ACS (acute coronary syndrome) ([address]  Resolved Problems:    * No resolved hospital problems.  *      Patient Active Problem List    Diagnosis Date Noted    Diabetes type 2, uncontrolled ([address] 08/08/2012     Priority: High    ACS (acute coronary syndrome) ([address] 06/27/2018    Muscle spasm of left shoulder     Chest pain 04/08/2018    Gait difficulty 04/24/2015    MS (multiple sclerosis) ([address] 11/10/2014    Cerebrovascular disease 12/17/2013    Memory loss 02/16/2013    Gout 11/08/2012    Spasticity 09/06/2012    Multiple sclerosis ([facility] [address] 09/06/2012    Dyslipidemia 08/08/2012    Hypothyroid 08/08/2012    HTN (hypertension) 08/08/2012    Obesity (BMI 30-39.9) 08/08/2012    Dermatitis herpetiformis     Multiple sclerosis, relapsing-remitting (HCC)     SVT (supraventricular tachycardia) (HCC)     PAD (peripheral artery disease) (HCC)     MINH (obstructive sleep apnea)        Plan:     · This patient requires inpatient admission because of ACS, left shoulder pain, tachycardia  · Factors affecting the medical complexity of this patient include MS, SVT, h/o muscle spasm left shoulder, DM  · Estimated length of stay is 2 days  · Stress test  · Cardiology consult  · SSI  · High risk medication monitoring: none    CORE MEASURES  DVT prophylaxis: Lovenox  Decubitus ulcer present on admission: No  CODE STATUS: FULL CODE  Nutrition Status: good   Physical therapy: NA   Old Charts reviewed: Yes  EKG Reviewed: Yes  Advance Directive Addressed:  Yes    Nelly Gastelum PA-C  6/27/2018, 3:33 PM

## 2018-06-27 NOTE — PROGRESS NOTES
Nutrition Assessment    Type and Reason for Visit: Initial    Nutrition Recommendations: lean protein choices and avoidance saturated fats. Malnutrition Assessment:  · Malnutrition Status: At risk for malnutrition  · Context: Acute illness or injury  · Findings of the 6 clinical characteristics of malnutrition (Minimum of 2 out of 6 clinical characteristics is required to make the diagnosis of moderate or severe Protein Calorie Malnutrition based on AND/ASPEN Guidelines):  1. Energy Intake-Less than or equal to 50%, not able to assess (acutely )    2. Weight Loss-No significant weight loss,    3. Fat Loss-No significant subcutaneous fat loss,    4. Muscle Loss-No significant muscle mass loss,    5. Fluid Accumulation-No significant fluid accumulation,    6.  Strength-Not measured    Nutrition Diagnosis:   · Problem: Predicted suboptimal energy intake  · Etiology: related to Other (self restricted dietary habits)     Signs and symptoms:  as evidenced by Patient report of (ketogenic diet at home)    Nutrition Assessment:  · Subjective Assessment: Reports being on a ketogenic diet with <20 grams carb daily for last 2 years, with her renal doctor's approval and 50# weight loss.     · Nutrition-Focused Physical Findings: obese  · Wound Type: None  · Current Nutrition Therapies:  · Oral Diet Orders: NPO   · Oral Diet intake: NPO  · Oral Nutrition Supplement (ONS) Orders: None  · Anthropometric Measures:  · Ht: 5' 5\" (165.1 cm)   · Current Body Wt: 186 lb 9.6 oz (84.6 kg)  · Admission Body Wt: 186 lb 9.6 oz (84.6 kg)  · Usual Body Wt:  (max of 222# noted 3 years ago (35# loss), also weights 182# earlier this year, for which current weight is a gain)  · % Weight Change: up and down per hx,  chronic up and down  · Ideal Body Wt: 125 lb (56.7 kg), % Ideal Body 149%  · BMI Classification: BMI 30.0 - 34.9 Obese Class I    Lab Results   Component Value Date     06/26/2018    K 4.5 06/26/2018    CL 91 (L) 06/26/2018 CO2 31 06/26/2018    BUN 24 (H) 06/26/2018    CREATININE 0.94 (H) 06/26/2018    GLUCOSE 321 06/26/2018    CALCIUM 9.8 06/26/2018    PROT 8.8 (H) 06/26/2018    LABALBU 4.4 06/26/2018    BILITOT 0.28 (L) 06/26/2018    ALKPHOS 119 (H) 06/26/2018    AST 10 06/26/2018    ALT 12 06/26/2018    LABGLOM >60 06/26/2018    GFRAA >60 06/26/2018     Lab Results   Component Value Date    LABA1C 7.5 02/18/2013     No results found for: EAG  Recent Labs      06/26/18   1703  06/26/18   2234   POCGLU  321*  204*       Estimated Intake vs Estimated Needs: Intake Less Than Needs    Nutrition Risk Level: Low, Moderate    Accepting of heart healthy information to use in conjunction with her self imposed ketogenic diet. Weight has decreased over time, with 51# loss noted if going back further in weight history (6 years, vs the 2 years patient had reported). No recent A1C with hyperglycemia r/t diabetes and steroids use.       Nutrition Interventions:   Start oral diet  Continued Inpatient Monitoring, Coordination of Care, Education Initiated    Nutrition Evaluation:   · Evaluation: Goals set   · Goals: PO >75% meals     · Monitoring: Patient/Family Education, Pertinent Labs, Weight, Meal Intake    Electronically signed by Lupillo Gan RD, TARIQ on 6/27/18 at 8:18 AM    Contact Number: [phone]

## 2018-06-27 NOTE — CONSULTS
Patient: David Zamora  : 1960  Date of Admission: 2018  Primary Care Physician: Summer Velasco  Today's Date: 2018    REASON FOR CONSULTATION: Tachycardia (pt states her HR was in the 120 and got nauseated.)    HPI:  Ms. Naveen Morales is a 66 y.o. female who initially seen in the hospital on 2018for substernal chest discomfort. Ms. Naveen Morales denies any past medical history significant for coronary artery disease, myocardial infarction of heart failure.      She has a history of paroxysmal supraventricular tachycardia and questionable pulmonary hypertension. She follows up with Dr. Addi العراقي, last time saw him was about 2 years ago. She had right and left heart catheterization back in  ( normal right-sided pressures and coronary arteries).     She has history of multiple sclerosis and usually gets lots of muscle spasm. She is complaining of occasional mid axillary pain/spasm that goes into the front of the chest.  She also complaining of short episodes of chest pressures. No relation to exertion. Not associated with any sweating or diaphoresis.     She has history of hypertension ( currently not on any antihypertensive medications since she lost weight), dyslipidemia, type 2 diabetes and family history of premature CAD.     CTA of the chest on 2018. Notable for coronary artery calcifications. She presented again to the emergency room yesterday with left shoulder pain, nausea and near syncope. Said symptoms are not similar to her supraventricular tachycardia episodes. She was anxious and her heart rate was 120 bpm.     She had a stress test done today which showed apical and anteroapical defect mainly during rest suggestive of ischemia. Normal ejection fraction.     Past Medical History:   Diagnosis Date    Alveolar hypoventilation     Dermatitis herpetiformis     GERD (gastroesophageal reflux disease)     Gout     Hearing loss     Hyperlipidemia     Hypertension     Hypothyroidism     [facility]     Multiple sclerosis [facility]     Neuropathy ([facility]     MINH (obstructive sleep apnea)     intolerant of cpap    PAD (peripheral artery disease) (HCC)     SVT [facility] tachycardia) [facility]     Type II or unspecified type diabetes mellitus without mention of complication, not stated as uncontrolled        CURRENT ALLERGIES: Bee venom; Erythromycin; and Janumet [sitagliptin-metformin hcl er] REVIEW OF SYSTEMS: 14 systems were reviewed. Pertinent positives and negatives as above, all else negative. Past Surgical History:   Procedure Laterality Date    CARPAL TUNNEL RELEASE      left    CERVICAL DISC SURGERY       SECTION          DILATION AND CURETTAGE OF UTERUS      HYSTERECTOMY  2006    cervical dysplasia    Social History:  Social History   Substance Use Topics    Smoking status: Never Smoker    Smokeless tobacco: Never Used    Alcohol use No      Comment: occ        CURRENT MEDICATIONS:  Outpatient Prescriptions Marked as Taking for the 18 encounter [facility] Encounter)   Medication Sig Dispense Refill    metoprolol succinate (TOPROL XL) 25 MG extended release tablet Take 0.5 tablets by mouth daily 30 tablet 3    atorvastatin (LIPITOR) 10 MG tablet Take 2 tablets by mouth daily 30 tablet 3    predniSONE (DELTASONE) 20 MG tablet Take 1 tablet by mouth daily for 7 days 7 tablet 0    levothyroxine (SYNTHROID) 200 MCG tablet Take 1 tablet by mouth Daily 30 tablet 1    baclofen (LIORESAL) 10 MG tablet Take 2 tab four times daily. (Patient taking differently: Take 10 mg by mouth 4 times daily as needed ) 240 tablet 6    furosemide (LASIX) 40 MG tablet Take 1 tablet by mouth 2 times daily. (Patient taking differently: Take 20 mg by mouth See Admin Instructions ususally takes on Monday, Wednesday, Friday.) 180 tablet 2    metformin (GLUCOPHAGE) 1000 MG tablet Take 1 tablet by mouth daily (with breakfast).  (Patient taking differently: order to define her coronary anatomy and rule out severe 3 vessel or left main coronary artery disease would significant help guide the most appropriate treatment strategy ranging from no treatment to medications, to stents, to even bypass surgery. Ms. Cooper Him verbalized understanding of the risks benefits and alternatives and stated that she would like to proceed with heart catheterization. I also discussed the advantages and disadvantages of having her procedure performed here at [facility] vs. a larger hospital such as [facility]. Beyond the obvious advantage of convenience, I also explained potential disadvantages including the inability to have immediate cardiac stenting performed or the presence of on site CT surgical backup. However, because of the lack of significant high risk feature on her stress test, I did tell her I thought that in her particular case, it would likely be safe to perform the procedure here. Therefore, after considering the options, Ms. Shea said she would like to proceed with a heart catheterization and would prefer to have the procedure performed at [facility]. Therefore we have schedule the procedure to be performed on Tomorrow at 1 PM.    Medical Management for suspected Coronary artery disease and myocardial ischemia:  Antiplatelet Agent: Antiplatelet Agent: Continue Aspirin 81 mg daily. I also reminded her to watch for signs of blood in her stool or black tarry stools and stop the medication immediately if this develops as this could be life threatening. Beta Blocker: Continue metoprolol succinate (Toprol XL) 12.5 mg daily I also discussed the potential side effects of this medication including lightheadedness and dizziness and told her to stop the medication of this occurs and call our office if this occurs. Statin Therapy: Continue atorvastatin (Lipitor) 20 mg nightly.  I discussed the potential benefits of statin therapy as well as the potential risks including myalgia as well as the rare but potentially serious complication of liver or kidney damage. Although rare, I told her that this could be serious and therefore told her to stop the medication immediately and call if she developed any severe muscle aches or pains and she agreed to do so. Once again, thank you for allowing me to participate in this patients care. Please do not hesitate to contact me could I be of further assistance. Sincerely,  Claudetta Drown MD, F.A.C.C. [facility] Cardiology Specialist, [address] [place] [address], [address]  Phone: [phone], Fax: [phone]  Based on the patients current medical conditions, I believe the risk of significant morbidity and mortality is: Intermediate to high.

## 2018-06-27 NOTE — PROGRESS NOTES
Discussed discharge plans with the patient. Patient is a 66year old female here with Tachycardia. She is alert and oriented. Patient is  and lives at home with her . She uses no medical equipment. Patient does the cooking and the cleaning at home. She manages her own medications and drives. Her PCP is Dr. Kyra Basurto. She has medical insurance that helps with medication costs. The discharge plan is home with no services.     SHRUTI Salamanca

## 2018-06-28 VITALS
SYSTOLIC BLOOD PRESSURE: 160 MMHG | TEMPERATURE: 97.7 F | HEART RATE: 78 BPM | WEIGHT: 188 LBS | DIASTOLIC BLOOD PRESSURE: 76 MMHG | HEIGHT: 65 IN | RESPIRATION RATE: 16 BRPM | BODY MASS INDEX: 31.32 KG/M2 | OXYGEN SATURATION: 99 %

## 2018-06-28 PROBLEM — R94.39 ABNORMAL CARDIOVASCULAR STRESS TEST: Status: ACTIVE | Noted: 2018-06-28

## 2018-06-28 LAB
ANION GAP SERPL CALCULATED.3IONS-SCNC: 10 MMOL/L (ref 9–17)
BUN BLDV-MCNC: 22 MG/DL (ref 6–20)
BUN/CREAT BLD: 24 (ref 9–20)
CALCIUM SERPL-MCNC: 9 MG/DL (ref 8.6–10.4)
CHLORIDE BLD-SCNC: 99 MMOL/L (ref 98–107)
CO2: 30 MMOL/L (ref 20–31)
CREAT SERPL-MCNC: 0.91 MG/DL (ref 0.5–0.9)
EKG ATRIAL RATE: 68 BPM
EKG P AXIS: 46 DEGREES
EKG P-R INTERVAL: 140 MS
EKG Q-T INTERVAL: 394 MS
EKG QRS DURATION: 88 MS
EKG QTC CALCULATION (BAZETT): 418 MS
EKG R AXIS: 19 DEGREES
EKG T AXIS: 37 DEGREES
EKG VENTRICULAR RATE: 68 BPM
GFR AFRICAN AMERICAN: >60 ML/MIN
GFR NON-AFRICAN AMERICAN: >60 ML/MIN
GFR SERPL CREATININE-BSD FRML MDRD: ABNORMAL ML/MIN/{1.73_M2}
GFR SERPL CREATININE-BSD FRML MDRD: ABNORMAL ML/MIN/{1.73_M2}
GLUCOSE BLD-MCNC: 197 MG/DL (ref 70–99)
GLUCOSE BLD-MCNC: 221 MG/DL (ref 74–100)
GLUCOSE BLD-MCNC: 259 MG/DL (ref 74–100)
GLUCOSE BLD-MCNC: 268 MG/DL (ref 74–100)
HCT VFR BLD CALC: 38.8 % (ref 36.3–47.1)
HEMOGLOBIN: 12.2 G/DL (ref 11.9–15.1)
MCH RBC QN AUTO: 28.2 PG (ref 25.2–33.5)
MCHC RBC AUTO-ENTMCNC: 31.4 G/DL (ref 28.4–34.8)
MCV RBC AUTO: 89.6 FL (ref 82.6–102.9)
NRBC AUTOMATED: 0 PER 100 WBC
PDW BLD-RTO: 13.3 % (ref 11.8–14.4)
PLATELET # BLD: 246 K/UL (ref 138–453)
PMV BLD AUTO: 11.6 FL (ref 8.1–13.5)
POTASSIUM SERPL-SCNC: 3.8 MMOL/L (ref 3.7–5.3)
RBC # BLD: 4.33 M/UL (ref 3.95–5.11)
SODIUM BLD-SCNC: 139 MMOL/L (ref 135–144)
WBC # BLD: 6 K/UL (ref 3.5–11.3)

## 2018-06-28 PROCEDURE — 93458 L HRT ARTERY/VENTRICLE ANGIO: CPT | Performed by: FAMILY MEDICINE

## 2018-06-28 PROCEDURE — B2111ZZ FLUOROSCOPY OF MULTIPLE CORONARY ARTERIES USING LOW OSMOLAR CONTRAST: ICD-10-PCS | Performed by: FAMILY MEDICINE

## 2018-06-28 PROCEDURE — 93005 ELECTROCARDIOGRAM TRACING: CPT

## 2018-06-28 PROCEDURE — 36415 COLL VENOUS BLD VENIPUNCTURE: CPT

## 2018-06-28 PROCEDURE — 6370000000 HC RX 637 (ALT 250 FOR IP): Performed by: FAMILY MEDICINE

## 2018-06-28 PROCEDURE — B2151ZZ FLUOROSCOPY OF LEFT HEART USING LOW OSMOLAR CONTRAST: ICD-10-PCS | Performed by: FAMILY MEDICINE

## 2018-06-28 PROCEDURE — 85027 COMPLETE CBC AUTOMATED: CPT

## 2018-06-28 PROCEDURE — 82947 ASSAY GLUCOSE BLOOD QUANT: CPT

## 2018-06-28 PROCEDURE — 4A023N7 MEASUREMENT OF CARDIAC SAMPLING AND PRESSURE, LEFT HEART, PERCUTANEOUS APPROACH: ICD-10-PCS | Performed by: FAMILY MEDICINE

## 2018-06-28 PROCEDURE — 80048 BASIC METABOLIC PNL TOTAL CA: CPT

## 2018-06-28 PROCEDURE — 6370000000 HC RX 637 (ALT 250 FOR IP): Performed by: INTERNAL MEDICINE

## 2018-06-28 RX ORDER — AMLODIPINE BESYLATE 2.5 MG/1
2.5 TABLET ORAL DAILY
Status: DISCONTINUED | OUTPATIENT
Start: 2018-06-28 | End: 2018-06-28 | Stop reason: HOSPADM

## 2018-06-28 RX ORDER — NITROGLYCERIN 0.4 MG/1
0.4 TABLET SUBLINGUAL EVERY 5 MIN PRN
Status: DISCONTINUED | OUTPATIENT
Start: 2018-06-28 | End: 2018-06-28 | Stop reason: HOSPADM

## 2018-06-28 RX ORDER — DIPHENHYDRAMINE HCL 25 MG
50 CAPSULE ORAL ONCE
Status: DISCONTINUED | OUTPATIENT
Start: 2018-06-28 | End: 2018-06-28 | Stop reason: HOSPADM

## 2018-06-28 RX ORDER — SODIUM CHLORIDE 0.9 % (FLUSH) 0.9 %
10 SYRINGE (ML) INJECTION PRN
Status: DISCONTINUED | OUTPATIENT
Start: 2018-06-28 | End: 2018-06-28 | Stop reason: HOSPADM

## 2018-06-28 RX ORDER — SODIUM CHLORIDE 0.9 % (FLUSH) 0.9 %
10 SYRINGE (ML) INJECTION EVERY 12 HOURS SCHEDULED
Status: DISCONTINUED | OUTPATIENT
Start: 2018-06-28 | End: 2018-06-28 | Stop reason: HOSPADM

## 2018-06-28 RX ORDER — SODIUM CHLORIDE 9 MG/ML
INJECTION, SOLUTION INTRAVENOUS CONTINUOUS
Status: DISCONTINUED | OUTPATIENT
Start: 2018-06-28 | End: 2018-06-28 | Stop reason: HOSPADM

## 2018-06-28 RX ORDER — ACETAMINOPHEN 325 MG/1
650 TABLET ORAL EVERY 4 HOURS PRN
Status: DISCONTINUED | OUTPATIENT
Start: 2018-06-28 | End: 2018-06-28 | Stop reason: HOSPADM

## 2018-06-28 RX ORDER — NITROGLYCERIN 0.4 MG/1
TABLET SUBLINGUAL
Qty: 25 TABLET | Refills: 3 | Status: SHIPPED | OUTPATIENT
Start: 2018-06-28 | End: 2022-04-21

## 2018-06-28 RX ORDER — AMLODIPINE BESYLATE 2.5 MG/1
2.5 TABLET ORAL DAILY
Qty: 30 TABLET | Refills: 0 | Status: SHIPPED | OUTPATIENT
Start: 2018-06-28 | End: 2018-12-06

## 2018-06-28 RX ORDER — ATORVASTATIN CALCIUM 40 MG/1
40 TABLET, FILM COATED ORAL DAILY
Status: DISCONTINUED | OUTPATIENT
Start: 2018-06-29 | End: 2018-06-28 | Stop reason: HOSPADM

## 2018-06-28 RX ORDER — ATORVASTATIN CALCIUM 40 MG/1
40 TABLET, FILM COATED ORAL DAILY
Qty: 30 TABLET | Refills: 3 | Status: SHIPPED | OUTPATIENT
Start: 2018-06-29 | End: 2018-12-06

## 2018-06-28 RX ADMIN — INSULIN LISPRO 6 UNITS: 100 INJECTION, SOLUTION INTRAVENOUS; SUBCUTANEOUS at 08:26

## 2018-06-28 RX ADMIN — AMLODIPINE BESYLATE 2.5 MG: 2.5 TABLET ORAL at 18:05

## 2018-06-28 RX ADMIN — BACLOFEN 10 MG: 10 TABLET ORAL at 12:36

## 2018-06-28 RX ADMIN — METOPROLOL SUCCINATE 12.5 MG: 25 TABLET, EXTENDED RELEASE ORAL at 08:26

## 2018-06-28 RX ADMIN — LEVOTHYROXINE SODIUM 200 MCG: 100 TABLET ORAL at 08:34

## 2018-06-28 RX ADMIN — INSULIN LISPRO 6 UNITS: 100 INJECTION, SOLUTION INTRAVENOUS; SUBCUTANEOUS at 18:07

## 2018-06-28 RX ADMIN — BACLOFEN 10 MG: 10 TABLET ORAL at 06:26

## 2018-06-28 RX ADMIN — FUROSEMIDE 20 MG: 20 TABLET ORAL at 08:26

## 2018-06-28 RX ADMIN — ATORVASTATIN CALCIUM 20 MG: 20 TABLET, FILM COATED ORAL at 08:26

## 2018-06-28 ASSESSMENT — PAIN SCALES - GENERAL
PAINLEVEL_OUTOF10: 0
PAINLEVEL_OUTOF10: 0

## 2018-06-28 NOTE — PLAN OF CARE
Problem: Pain:  Goal: Pain level will decrease  Pain level will decrease   Outcome: Ongoing  Pt. Denies and pain or discomfort. Will continue to monitor. Problem: SAFETY  Goal: Free from accidental physical injury  Outcome: Ongoing  Remains free from injury. Pathway clear for ambulation. Bed in lowest position with wheels locked and side rails up x 2. Call light and belongings within reach. Problem: DAILY CARE  Goal: Daily care needs are met  Outcome: Ongoing  Pt. Completes daily care with minimal assistance for set up, completes to max abilities.

## 2018-06-28 NOTE — PROCEDURES
[address] [address]                                CARDIAC STRESS TEST    PATIENT NAME: Lavinia Lorenz                   :        1960  MED REC NO:   [id number]                              ROOM:       0320  ACCOUNT NO:   [de-identified]                           ADMIT DATE: 2018  PROVIDER:     Evelyne Duarte    CARDIOVASCULAR DIAGNOSTIC DEPARTMENT    DATE OF STUDY:  2018    ORDERING PROVIDER:  Evelyne Duarte MD    PRIMARY CARE PROVIDER:  Sandra Sumner DO    INTERPRETING PHYSICIAN:  Evelyne Duarte MD    PHARMACOLOGIC MYOCARDIAL PERFUSION STRESS TESTING    Rest/stress single isotope SPECT imaging with exercise stress and gated  SPECT imaging. INDICATIONS:  Assessment of recent chest pain and/or discomfort    CLINICAL HISTORY:  The patient is a 53-year-old woman with no known  coronary artery disease. Previous cardiac history includes:  None    Other previous history includes:  Chest pain, palpitations, fatigue,  lightheadedness, syncope, diabetes mellitus, MS, vasodepressor syndrome    Symptoms just prior to testing include:  None    Relevant medications:  Metoprolol    PROCEDURE:  The heart rate was 84 at baseline and kenton to 115 beats per  minute during the regadenoson infusion. The rest blood pressure was 156/93  mm/Hg and decreased to 135/89 mm/Hg. The patient did not complain of any  significant symptoms following infusion. Pharmacologic stress testing was performed with regadenoson at a dose of  0.4 mg. Additionally, low level exercise using hand compressions were  performed along with vasodilator infusion. MYOCARDIAL PERFUSION IMAGING:  Imaging was performed at rest 30-45 minutes  following the injection of 10.0 mCi of sestamibi. Approximately 10 seconds  after Lexiscan injection, the patient was injected with 30.0 mCi of  sestamibi.   Gating post-stress tomographic imaging was performed

## 2018-06-28 NOTE — PROGRESS NOTES
Hospitalist Progress Note    SUBJECTIVE/INTERVAL HISTORY:    Patient seen in follow up for ACS, left shoulder spasm, h/o SVT, MS. No further palpitations. Left shoulder spasms improved. NO acute events overnight. OBJECTIVE:    Vitals:   Temp: 98.1 °F (36.7 °C)  BP: 131/68  Resp: 16  Pulse: 76  SpO2: 97 %  24HR INTAKE/OUTPUT:      Intake/Output Summary (Last 24 hours) at 06/28/18 1027  Last data filed at 06/28/18 0829   Gross per 24 hour   Intake              250 ml   Output                0 ml   Net              250 ml       -----------------------------------------------------------------  Review of Systems:  Constitutional:negative  for fevers, and negative for chills. Eyes: negative for visual disturbance   ENT: negative for sore throat, negative nasal congestion, and negative for earache  Respiratory: negative for shortness of breath, negative for cough, and negative for wheezing  Cardiovascular: negative for chest pain, negative for palpitations, and negative for syncope  Gastrointestinal: negative for abdominal pain, negative for nausea,negative for vomiting, negative for diarrhea, negative for constipation, and negative for hematochezia or melena  Genitourinary: negative for dysuria, negative for urinary urgency, negative for urinary frequency, and negative for hematuria  Skin: negative for skin rash, and negative for skin lesions  Neurological: negative for unilateral weakness, numbness or tingling.     Exam:  GEN:  alert and oriented to person, place and time, in no acute distress  EYES:  PERRL  NECK: normal, supple  PULM: clear to auscultation bilaterally- no wheezes, rales or rhonchi, normal air movement, no respiratory distress  COR:   regular rate & rhythm and no murmurs  ABD:    soft, non-tender, non-distended, normal bowel sounds, no masses or organomegaly  EXT:    no cyanosis, clubbing or edema present    NEURO: follows commands, GRULLON, no deficits  SKIN:   no rashes or significant lesions      -----------------------------------------------------------------  Diagnostic Data:  Lab Results   Component Value Date    WBC 6.0 06/28/2018    HGB 12.2 06/28/2018    HCT 38.8 06/28/2018     06/28/2018    CHOL 215 (H) 04/09/2018    TRIG 99 04/09/2018     04/09/2018    ALT 12 06/26/2018    AST 10 06/26/2018     06/28/2018    K 3.8 06/28/2018    CL 99 06/28/2018    CREATININE 0.91 (H) 06/28/2018    BUN 22 (H) 06/28/2018    CO2 30 06/28/2018    TSH 1.93 06/26/2018    INR 1.0 06/22/2018    LABA1C 7.5 02/18/2013       ASSESSMENT:    Principal Problem:    ACS (acute coronary syndrome) ([facility])  Active Problems:    Diabetes type 2, uncontrolled ([facility]    Hypothyroid    MS (multiple sclerosis) ([facility])    Muscle spasm of left shoulder  Resolved Problems:    * No resolved hospital problems. *      Patient Active Problem List    Diagnosis Date Noted    Diabetes type 2, uncontrolled ([facility] 08/08/2012     Priority: High    ACS (acute coronary syndrome) (HCC) 06/27/2018    Muscle spasm of left shoulder     Gait difficulty 04/24/2015    MS (multiple sclerosis) ([facility] 11/10/2014    Cerebrovascular disease 12/17/2013    Memory loss 02/16/2013    Gout 11/08/2012    Spasticity 09/06/2012    Multiple sclerosis ([facility] 09/06/2012    Dyslipidemia 08/08/2012    Hypothyroid 08/08/2012    HTN (hypertension) 08/08/2012    Obesity (BMI 30-39.9) 08/08/2012    Dermatitis herpetiformis     Multiple sclerosis, relapsing-remitting (HCC)     SVT (supraventricular tachycardia) (HCC)     PAD (peripheral artery disease) ([facility])     MINH (obstructive sleep apnea)        PLAN:    ACS/SVT   Appreciate cards   Cath today    Diabetes type 2, uncontrolled     Hypothyroid    MS     Muscle spasm of left shoulder  Resolved Problems:    * No resolved hospital problems.  *    Likely discharge today if cath ok    · High risk medication: none    SEAN Aden PA-C  6/28/2018, 10:27 AM

## 2018-06-28 NOTE — DISCHARGE SUMMARY
rhonchi, normal air movement, no respiratory distress  COR: regular rate & rhythm, no murmurs and no gallops  ABD:  soft, non-tender, non-distended, normal bowel sounds, no masses or organomegaly  EXT:   no cyanosis, clubbing or edema present    NEURO: negative  SKIN:  no rashes or significant lesions  -----------------------------------------------------------------          Hospital Course: The patient was admitted for the above. She was treated for chest pain and had stress test and cardiac cath. Based on results of cath she was advised maximal medical therapy by DR Elliot Perkins and discharged home in stable conditon. Consultants:    · cardiology    Procedures:    · Lt heart cath    Complications:   · none    Significant Diagnostic Studies:   Xr Chest Portable    Result Date: 6/26/2018  REPORT: Portable AP radiograph of the chest obtained at 1459  hours INDICATION: Tachycardia FINDINGS:  Compared to 6/22/2018. The lungs are well expanded and clear bilaterally. No focal consolidation, pleural effusion or pneumothorax seen. Normal cardiac and mediastinal silhouettes. No free intraperitoneal air. Final report electronically signed by Nehal Hughes on 6/26/2018 3:41 PM    Normal chest    Xr Chest 1 Vw    Result Date: 6/22/2018  REPORT: Portable AP radiograph of the chest obtained at 1045  hours INDICATION: Chest pain FINDINGS:  Compared to 4/8/2018. The lungs are well expanded and clear bilaterally. No focal consolidation, pleural effusion or pneumothorax seen. Normal cardiac and mediastinal silhouettes. No free intraperitoneal air. Final report electronically signed by Nehal Hughes on 6/22/2018 11:10 AM    Normal chest    Cta Chest W Contrast    Result Date: 6/22/2018  REPORT: CTA chest with contrast TECHNIQUE: Contiguous thin axial slices through the chest obtained after administration of 100 mL Isovue-300  IV as per protocol. Axial, sagittal and coronal MIP reformats were made from the source images.  INDICATION: Chest pain and elevated d-dimer, left arm pain FINDINGS: ANGIOGRAPHY: Normal cardiac size. Dense coronary artery atherosclerosis. Bovine configuration of the aortic arch. Non-aneurysmal thoracic aorta. There is no evidence of aortic dissection or aortic disruption. Normal main pulmonary artery size. No filling  defects in the pulmonary arteries to suggest acute pulmonary embolus. CHEST: The lungs are well expanded and clear bilaterally. No focal consolidation, pleural effusion or pneumothorax. No mass lesion is identified. No mediastinal or hilar lymphadenopathy. Stable benign left adrenal gland adenoma. Degenerative changes thoracic spine. 1. No Evidence of Acute Pulmonary Embolus or Acute Aortic Pathology 2. No Acute Pulmonary Process 3.  Dense coronary artery disease Final report electronically signed by Delano Alvarado on 6/22/2018 2:41 PM    Recent Results (from the past 96 hour(s))   CBC    Collection Time: 06/26/18  2:34 PM   Result Value Ref Range    WBC 8.8 3.5 - 11.3 k/uL    RBC 4.95 3.95 - 5.11 m/uL    Hemoglobin 14.1 11.9 - 15.1 g/dL    Hematocrit 43.8 36.3 - 47.1 %    MCV 88.5 82.6 - 102.9 fL    MCH 28.5 25.2 - 33.5 pg    MCHC 32.2 28.4 - 34.8 g/dL    RDW 13.3 11.8 - 14.4 %    Platelets 588 652 - 766 k/uL    MPV 12.1 8.1 - 13.5 fL    NRBC Automated 0.0 0.0 per 100 WBC   Comprehensive Metabolic Panel    Collection Time: 06/26/18  2:34 PM   Result Value Ref Range    Glucose 417 (HH) 70 - 99 mg/dL    BUN 24 (H) 6 - 20 mg/dL    CREATININE 0.94 (H) 0.50 - 0.90 mg/dL    Bun/Cre Ratio 26 (H) 9 - 20    Calcium 9.8 8.6 - 10.4 mg/dL    Sodium 136 135 - 144 mmol/L    Potassium 4.5 3.7 - 5.3 mmol/L    Chloride 91 (L) 98 - 107 mmol/L    CO2 31 20 - 31 mmol/L    Anion Gap 14 9 - 17 mmol/L    Alkaline Phosphatase 119 (H) 35 - 104 U/L    ALT 12 5 - 33 U/L    AST 10 <32 U/L    Total Bilirubin 0.28 (L) 0.3 - 1.2 mg/dL    Total Protein 8.8 (H) 6.4 - 8.3 g/dL    Alb 4.4 3.5 - 5.2 g/dL    Albumin/Globulin Ratio 1.0 1.0 - 2.5    GFR Non-African American >60 >60 mL/min    GFR African American >60 >60 mL/min    GFR Comment          GFR Staging         Troponin    Collection Time: 06/26/18  2:34 PM   Result Value Ref Range    Troponin T <0.03 <0.03 ng/mL    Troponin Interp         TSH without Reflex    Collection Time: 06/26/18  2:34 PM   Result Value Ref Range    TSH 1.93 0.30 - 5.00 mIU/L   T4, Free    Collection Time: 06/26/18  2:34 PM   Result Value Ref Range    Thyroxine, Free 1.65 0.93 - 1.70 ng/dL   EKG 12 Lead    Collection Time: 06/26/18  3:16 PM   Result Value Ref Range    Ventricular Rate 80 BPM    Atrial Rate 80 BPM    P-R Interval 126 ms    QRS Duration 80 ms    Q-T Interval 374 ms    QTc Calculation (Bazett) 431 ms    P Axis 47 degrees    R Axis 19 degrees    T Axis 49 degrees   POCT Glucose    Collection Time: 06/26/18  5:03 PM   Result Value Ref Range    Glucose 321 mg/dL    QC OK?  yes    Glucose, Whole Blood    Collection Time: 06/26/18  5:03 PM   Result Value Ref Range    POC Glucose 321 (H) 74 - 100 mg/dL   EKG 12 Lead    Collection Time: 06/26/18  5:41 PM   Result Value Ref Range    Ventricular Rate 71 BPM    Atrial Rate 71 BPM    P-R Interval 130 ms    QRS Duration 84 ms    Q-T Interval 386 ms    QTc Calculation (Bazett) 419 ms    P Axis 58 degrees    R Axis 18 degrees    T Axis 30 degrees   Troponin    Collection Time: 06/26/18  5:50 PM   Result Value Ref Range    Troponin T <0.03 <0.03 ng/mL    Troponin Interp         Glucose, Whole Blood    Collection Time: 06/26/18 10:34 PM   Result Value Ref Range    POC Glucose 204 (H) 74 - 100 mg/dL   EKG 12 lead    Collection Time: 06/27/18 12:09 AM   Result Value Ref Range    Ventricular Rate 73 BPM    Atrial Rate 73 BPM    P-R Interval 136 ms    QRS Duration 92 ms    Q-T Interval 390 ms    QTc Calculation (Bazett) 429 ms    P Axis 51 degrees    R Axis 18 degrees    T Axis 35 degrees   EKG 12 lead    Collection Time: 06/27/18  4:07 AM   Result Value Ref Range    Ventricular Rate 68 12.2 11.9 - 15.1 g/dL    Hematocrit 38.8 36.3 - 47.1 %    MCV 89.6 82.6 - 102.9 fL    MCH 28.2 25.2 - 33.5 pg    MCHC 31.4 28.4 - 34.8 g/dL    RDW 13.3 11.8 - 14.4 %    Platelets 681 112 - 724 k/uL    MPV 11.6 8.1 - 13.5 fL    NRBC Automated 0.0 0.0 per 100 WBC   Basic Metabolic Panel    Collection Time: 06/28/18  5:45 AM   Result Value Ref Range    Glucose 197 (H) 70 - 99 mg/dL    BUN 22 (H) 6 - 20 mg/dL    CREATININE 0.91 (H) 0.50 - 0.90 mg/dL    Bun/Cre Ratio 24 (H) 9 - 20    Calcium 9.0 8.6 - 10.4 mg/dL    Sodium 139 135 - 144 mmol/L    Potassium 3.8 3.7 - 5.3 mmol/L    Chloride 99 98 - 107 mmol/L    CO2 30 20 - 31 mmol/L    Anion Gap 10 9 - 17 mmol/L    GFR Non-African American >60 >60 mL/min    GFR African American >60 >60 mL/min    GFR Comment          GFR Staging         Glucose, Whole Blood    Collection Time: 06/28/18  7:05 AM   Result Value Ref Range    POC Glucose 259 (H) 74 - 100 mg/dL   Glucose, Whole Blood    Collection Time: 06/28/18 11:47 AM   Result Value Ref Range    POC Glucose 221 (H) 74 - 100 mg/dL   Glucose, Whole Blood    Collection Time: 06/28/18  5:03 PM   Result Value Ref Range    POC Glucose 268 (H) 74 - 100 mg/dL     ·     Discharge Condition:   · stable    Disposition:   · home    Discharge Medications:     [facility] Medication Instructions CUB:[id number]    Printed on:06/28/18 9604   Medication Information                      amLODIPine (NORVASC) 2.5 MG tablet  Take 1 tablet by mouth daily             aspirin 325 MG EC tablet  Take 325 mg by mouth daily              atorvastatin (LIPITOR) 10 MG tablet  Take 2 tablets by mouth daily             baclofen (LIORESAL) 10 MG tablet  Take 2 tab four times daily. furosemide (LASIX) 40 MG tablet  Take 1 tablet by mouth 2 times daily.              levothyroxine (SYNTHROID) 200 MCG tablet  Take 1 tablet by mouth Daily             Magnesium 250 MG TABS  Take 1,000 mg by mouth every morning              metformin (GLUCOPHAGE) 1000 MG tablet  Take 1 tablet by mouth daily (with breakfast). metoprolol succinate (TOPROL XL) 25 MG extended release tablet  Take 0.5 tablets by mouth daily             nitroGLYCERIN (NITROSTAT) 0.4 MG SL tablet  up to max of 3 total doses. If no relief after 1 dose, call 911. OXYGEN  2 lpm @ nasal cannula, continuous, both portable and stationary systems. · Resume all home medications unless otherwise directed  · Add norvasc  · Hold metformin,have bun/cr on 6/30 and resume after discussion with pcp    Patient Instructions:   · Activity: activity as tolerated  · Diet: cardiac diet  · Wound Care: as directed  · Other:   · Follow up with Dr Mehnaz Carvajal in 5 days as directed and DR Abelino Aguayo as diredted      Time Spent on discharge services is 25 minutes in the examination, evaluation, counseling and review of medications and discharge plan.     Signed:  Ashley Joyce M.D.  6/28/2018  5:51 PM

## 2018-07-01 LAB
BUN BLDV-MCNC: 26 MG/DL (ref 8–23)
CREAT SERPL-MCNC: 1 MG/DL (ref 0.6–1.3)
EGFR AFRICAN AMERICAN: 72.9 ML/MIN/1.73 M2
EGFR IF NONAFRICAN AMERICAN: 62.9 ML/MIN/1.73 M2

## 2018-07-02 ENCOUNTER — TELEPHONE (OUTPATIENT)
Dept: CARDIOLOGY | Age: 58
End: 2018-07-02

## 2018-07-11 ENCOUNTER — HOSPITAL ENCOUNTER (EMERGENCY)
Age: 58
Discharge: HOME | End: 2018-07-11
Attending: EMERGENCY MEDICINE
Payer: MEDICARE

## 2018-07-11 ENCOUNTER — APPOINTMENT (OUTPATIENT)
Dept: CT IMAGING | Age: 58
End: 2018-07-11
Payer: MEDICARE

## 2018-07-11 VITALS
SYSTOLIC BLOOD PRESSURE: 134 MMHG | HEART RATE: 73 BPM | OXYGEN SATURATION: 99 % | RESPIRATION RATE: 16 BRPM | HEIGHT: 65 IN | WEIGHT: 186 LBS | TEMPERATURE: 98.2 F | DIASTOLIC BLOOD PRESSURE: 63 MMHG | BODY MASS INDEX: 30.99 KG/M2

## 2018-07-11 DIAGNOSIS — R20.2 FACIAL PARESTHESIA: Primary | ICD-10-CM

## 2018-07-11 LAB
ABSOLUTE EOS #: 0.26 K/UL (ref 0–0.44)
ABSOLUTE IMMATURE GRANULOCYTE: <0.03 K/UL (ref 0–0.3)
ABSOLUTE LYMPH #: 0.82 K/UL (ref 1.1–3.7)
ABSOLUTE MONO #: 0.51 K/UL (ref 0.1–1.2)
ALBUMIN SERPL-MCNC: 4.1 G/DL (ref 3.5–5.2)
ALBUMIN/GLOBULIN RATIO: 1 (ref 1–2.5)
ALP BLD-CCNC: 91 U/L (ref 35–104)
ALT SERPL-CCNC: 11 U/L (ref 5–33)
ANION GAP SERPL CALCULATED.3IONS-SCNC: 11 MMOL/L (ref 9–17)
AST SERPL-CCNC: 12 U/L
BASOPHILS # BLD: 1 % (ref 0–2)
BASOPHILS ABSOLUTE: 0.05 K/UL (ref 0–0.2)
BILIRUB SERPL-MCNC: 0.27 MG/DL (ref 0.3–1.2)
BUN BLDV-MCNC: 34 MG/DL (ref 6–20)
BUN/CREAT BLD: 39 (ref 9–20)
CALCIUM SERPL-MCNC: 9.6 MG/DL (ref 8.6–10.4)
CHLORIDE BLD-SCNC: 94 MMOL/L (ref 98–107)
CO2: 30 MMOL/L (ref 20–31)
CREAT SERPL-MCNC: 0.87 MG/DL (ref 0.5–0.9)
DIFFERENTIAL TYPE: ABNORMAL
EKG ATRIAL RATE: 72 BPM
EKG P AXIS: 44 DEGREES
EKG P-R INTERVAL: 134 MS
EKG Q-T INTERVAL: 386 MS
EKG QRS DURATION: 88 MS
EKG QTC CALCULATION (BAZETT): 422 MS
EKG R AXIS: 18 DEGREES
EKG T AXIS: 37 DEGREES
EKG VENTRICULAR RATE: 72 BPM
EOSINOPHILS RELATIVE PERCENT: 4 % (ref 1–4)
GFR AFRICAN AMERICAN: >60 ML/MIN
GFR NON-AFRICAN AMERICAN: >60 ML/MIN
GFR SERPL CREATININE-BSD FRML MDRD: ABNORMAL ML/MIN/{1.73_M2}
GFR SERPL CREATININE-BSD FRML MDRD: ABNORMAL ML/MIN/{1.73_M2}
GLUCOSE BLD-MCNC: 258 MG/DL (ref 70–99)
HCT VFR BLD CALC: 39.9 % (ref 36.3–47.1)
HEMOGLOBIN: 13 G/DL (ref 11.9–15.1)
IMMATURE GRANULOCYTES: 0 %
INR BLD: 0.9 (ref 0.9–1.2)
LYMPHOCYTES # BLD: 13 % (ref 24–43)
MAGNESIUM: 2.1 MG/DL (ref 1.6–2.6)
MCH RBC QN AUTO: 28.6 PG (ref 25.2–33.5)
MCHC RBC AUTO-ENTMCNC: 32.6 G/DL (ref 28.4–34.8)
MCV RBC AUTO: 87.9 FL (ref 82.6–102.9)
MONOCYTES # BLD: 8 % (ref 3–12)
NRBC AUTOMATED: 0 PER 100 WBC
PARTIAL THROMBOPLASTIN TIME: 30.3 SEC (ref 23.2–34.4)
PDW BLD-RTO: 13.8 % (ref 11.8–14.4)
PLATELET # BLD: 230 K/UL (ref 138–453)
PLATELET ESTIMATE: ABNORMAL
PMV BLD AUTO: 12.4 FL (ref 8.1–13.5)
POTASSIUM SERPL-SCNC: 4.1 MMOL/L (ref 3.7–5.3)
PROTHROMBIN TIME: 9.8 SEC (ref 9.7–12.2)
RBC # BLD: 4.54 M/UL (ref 3.95–5.11)
RBC # BLD: ABNORMAL 10*6/UL
SEG NEUTROPHILS: 74 % (ref 36–65)
SEGMENTED NEUTROPHILS ABSOLUTE COUNT: 4.62 K/UL (ref 1.5–8.1)
SODIUM BLD-SCNC: 135 MMOL/L (ref 135–144)
TOTAL PROTEIN: 8.1 G/DL (ref 6.4–8.3)
TROPONIN INTERP: NORMAL
TROPONIN T: <0.03 NG/ML
WBC # BLD: 6.3 K/UL (ref 3.5–11.3)
WBC # BLD: ABNORMAL 10*3/UL

## 2018-07-11 PROCEDURE — 83735 ASSAY OF MAGNESIUM: CPT

## 2018-07-11 PROCEDURE — 85730 THROMBOPLASTIN TIME PARTIAL: CPT

## 2018-07-11 PROCEDURE — 70450 CT HEAD/BRAIN W/O DYE: CPT

## 2018-07-11 PROCEDURE — 36415 COLL VENOUS BLD VENIPUNCTURE: CPT

## 2018-07-11 PROCEDURE — 85610 PROTHROMBIN TIME: CPT

## 2018-07-11 PROCEDURE — 93005 ELECTROCARDIOGRAM TRACING: CPT

## 2018-07-11 PROCEDURE — 99285 EMERGENCY DEPT VISIT HI MDM: CPT

## 2018-07-11 PROCEDURE — 85025 COMPLETE CBC W/AUTO DIFF WBC: CPT

## 2018-07-11 PROCEDURE — 80053 COMPREHEN METABOLIC PANEL: CPT

## 2018-07-11 PROCEDURE — 84484 ASSAY OF TROPONIN QUANT: CPT

## 2018-07-11 RX ORDER — FUROSEMIDE 20 MG/1
20 TABLET ORAL DAILY
COMMUNITY
End: 2022-04-21

## 2018-07-11 ASSESSMENT — PAIN DESCRIPTION - FREQUENCY: FREQUENCY: CONTINUOUS

## 2018-07-11 ASSESSMENT — PAIN SCALES - GENERAL: PAINLEVEL_OUTOF10: 4

## 2018-07-11 ASSESSMENT — PAIN DESCRIPTION - LOCATION: LOCATION: HEAD

## 2018-07-11 ASSESSMENT — PAIN DESCRIPTION - DESCRIPTORS: DESCRIPTORS: ACHING;DISCOMFORT

## 2018-07-11 ASSESSMENT — PAIN DESCRIPTION - PAIN TYPE: TYPE: ACUTE PAIN

## 2018-07-11 NOTE — ED PROVIDER NOTES
HPI:  18,   Time: 7:04 AM         Michael Escudero is a 66 y.o. female presenting to the ED for numb sensation over the left cheek and lip, beginning 2 hours ago. The complaint has been constant, moderate in severity, and worsened by nothing. Improved at this point to very minimal symptoms. No syncope convulsions vertigo or memory loss personality change and no difficulty walking or talking or seeing. No headache. Did complain of palpitations earlier    ROS:   Pertinent positives and negatives are stated within HPI, all other systems reviewed and are negative.  --------------------------------------------- PAST HISTORY ---------------------------------------------  Past Medical History:  has a past medical history of Alveolar hypoventilation; Dermatitis herpetiformis; GERD (gastroesophageal reflux disease); Gout; Hearing loss; Hyperlipidemia; Hypertension; Hypothyroidism; Morbid obesity ([address] Multiple sclerosis ([address] Neuropathy ([address] MINH (obstructive sleep apnea); PAD (peripheral artery disease) ([address] SVT (supraventricular tachycardia) ([address] and Type II or unspecified type diabetes mellitus without mention of complication, not stated as uncontrolled. Past Surgical History:  has a past surgical history that includes Hysterectomy ();  section; Carpal tunnel release; Dilation and curettage of uterus; Cervical disc surgery; and Cardiac catheterization (Left, 2018). Social History:  reports that she has never smoked. She has never used smokeless tobacco. She reports that she does not drink alcohol or use drugs. Family History: family history includes Colon Cancer in her paternal grandmother; Colon Polyps in an other family member; Diabetes in her maternal grandfather, maternal grandmother, and mother; Heart Disease in her father; High Cholesterol in her mother; Hypertension in her father and mother; Mult Sclerosis in her sister; Other in her father and paternal grandfather. The patients home medications have been reviewed.     Allergies: Bee venom; Erythromycin; and Janumet [sitagliptin-metformin hcl er]    -------------------------------------------------- RESULTS -------------------------------------------------  All laboratory and radiology results have been personally reviewed by myself   LABS:  Results for orders placed or performed during the hospital encounter of 07/11/18   APTT   Result Value Ref Range    PTT 30.3 23.2 - 34.4 sec   CBC Auto Differential   Result Value Ref Range    WBC 6.3 3.5 - 11.3 k/uL    RBC 4.54 3.95 - 5.11 m/uL    Hemoglobin 13.0 11.9 - 15.1 g/dL    Hematocrit 39.9 36.3 - 47.1 %    MCV 87.9 82.6 - 102.9 fL    MCH 28.6 25.2 - 33.5 pg    MCHC 32.6 28.4 - 34.8 g/dL    RDW 13.8 11.8 - 14.4 %    Platelets 090 169 - 994 k/uL    MPV 12.4 8.1 - 13.5 fL    NRBC Automated 0.0 0.0 per 100 WBC    Differential Type NOT REPORTED     Seg Neutrophils 74 (H) 36 - 65 %    Lymphocytes 13 (L) 24 - 43 %    Monocytes 8 3 - 12 %    Eosinophils % 4 1 - 4 %    Basophils 1 0 - 2 %    Immature Granulocytes 0 0 %    Segs Absolute 4.62 1.50 - 8.10 k/uL    Absolute Lymph # 0.82 (L) 1.10 - 3.70 k/uL    Absolute Mono # 0.51 0.10 - 1.20 k/uL    Absolute Eos # 0.26 0.00 - 0.44 k/uL    Basophils # 0.05 0.00 - 0.20 k/uL    Absolute Immature Granulocyte <0.03 0.00 - 0.30 k/uL    WBC Morphology NOT REPORTED     RBC Morphology NOT REPORTED     Platelet Estimate NOT REPORTED    Comprehensive Metabolic Panel   Result Value Ref Range    Glucose 258 (H) 70 - 99 mg/dL    BUN 34 (H) 6 - 20 mg/dL    CREATININE 0.87 0.50 - 0.90 mg/dL    Bun/Cre Ratio 39 (H) 9 - 20    Calcium 9.6 8.6 - 10.4 mg/dL    Sodium 135 135 - 144 mmol/L    Potassium Pending mmol/L    Chloride 94 (L) 98 - 107 mmol/L    CO2 30 20 - 31 mmol/L    Anion Gap 11 9 - 17 mmol/L    Alkaline Phosphatase 91 35 - 104 U/L    ALT Pending U/L    AST Pending U/L    Total Bilirubin 0.27 (L) 0.3 - 1.2 mg/dL    Total Protein 8.1 6.4 - 8.3 g/dL Alb 4.1 3.5 - 5.2 g/dL    Albumin/Globulin Ratio 1.0 1.0 - 2.5    GFR Non-African American >60 >60 mL/min    GFR African American >60 >60 mL/min    GFR Comment          GFR Staging         Protime-INR   Result Value Ref Range    Protime 9.8 9.7 - 12.2 sec    INR 0.9 0.9 - 1.2   Troponin   Result Value Ref Range    Troponin T <0.03 <0.03 ng/mL    Troponin Interp         Magnesium   Result Value Ref Range    Magnesium 2.1 1.6 - 2.6 mg/dL       RADIOLOGY:  Interpreted by Radiologist.  CT Head WO Contrast   Final Result   Impression:     No acute intracranial abnormality identified. Multiple partially calcified subcutaneous nodules again noted similar to the previous exam.             If symptoms or clinical suspicion persists, MRI can be done for further evaluation. Negative stroke findings were reported to  Nurse Katharine Gómez     by the operations support staff at 732 hours [place] time, 07/11/2018. Electronically Signed By: Robyn Mobley   on  07/11/2018  07:35          ------------------------- NURSING NOTES AND VITALS REVIEWED ---------------------------   The nursing notes within the ED encounter and vital signs as below have been reviewed. BP (!) 158/85   Pulse 70   Temp 98.2 °F (36.8 °C) (Tympanic)   Resp 18   Ht 5' 5\" (1.651 m)   Wt 186 lb (84.4 kg)   SpO2 98%   BMI 30.95 kg/m²   Oxygen Saturation Interpretation: Normal      ---------------------------------------------------PHYSICAL EXAM--------------------------------------      Constitutional/General: Alert and oriented x3, well appearing, non toxic in NAD  Head: NC/AT  Eyes: PERRL, EOMI  Mouth: Oropharynx clear, handling secretions, no trismus  Neck: Supple, full ROM, no meningeal signs  Pulmonary: Lungs clear to auscultation bilaterally, no wheezes, rales, or rhonchi. Not in respiratory distress  Cardiovascular:  Regular rate and rhythm, no murmurs, gallops, or rubs.  2+ distal pulses  Abdomen: Soft, non tender, non

## 2018-07-12 ENCOUNTER — HOSPITAL ENCOUNTER (OUTPATIENT)
Dept: MRI IMAGING | Age: 58
Discharge: HOME | End: 2018-07-14
Payer: MEDICARE

## 2018-07-12 DIAGNOSIS — G35 RELAPSING REMITTING MULTIPLE SCLEROSIS (HCC): ICD-10-CM

## 2018-07-12 PROCEDURE — 70553 MRI BRAIN STEM W/O & W/DYE: CPT

## 2018-07-12 PROCEDURE — 6360000004 HC RX CONTRAST MEDICATION: Performed by: PSYCHIATRY & NEUROLOGY

## 2018-07-12 PROCEDURE — A9579 GAD-BASE MR CONTRAST NOS,1ML: HCPCS | Performed by: PSYCHIATRY & NEUROLOGY

## 2018-07-12 RX ADMIN — GADOTERIDOL 16 ML: 279.3 INJECTION, SOLUTION INTRAVENOUS at 10:45

## 2018-12-05 ENCOUNTER — HOSPITAL ENCOUNTER (EMERGENCY)
Age: 58
Discharge: HOME | End: 2018-12-05
Attending: EMERGENCY MEDICINE
Payer: MEDICARE

## 2018-12-05 VITALS
SYSTOLIC BLOOD PRESSURE: 167 MMHG | WEIGHT: 187 LBS | TEMPERATURE: 98.8 F | BODY MASS INDEX: 31.16 KG/M2 | HEART RATE: 95 BPM | DIASTOLIC BLOOD PRESSURE: 81 MMHG | HEIGHT: 65 IN | RESPIRATION RATE: 18 BRPM | OXYGEN SATURATION: 99 %

## 2018-12-05 DIAGNOSIS — E03.9 HYPOTHYROIDISM, UNSPECIFIED TYPE: ICD-10-CM

## 2018-12-05 DIAGNOSIS — K59.00 CONSTIPATION, UNSPECIFIED CONSTIPATION TYPE: ICD-10-CM

## 2018-12-05 DIAGNOSIS — R00.2 PALPITATIONS: Primary | ICD-10-CM

## 2018-12-05 LAB
ABSOLUTE EOS #: 0.23 K/UL (ref 0–0.44)
ABSOLUTE IMMATURE GRANULOCYTE: <0.03 K/UL (ref 0–0.3)
ABSOLUTE LYMPH #: 0.8 K/UL (ref 1.1–3.7)
ABSOLUTE MONO #: 0.65 K/UL (ref 0.1–1.2)
ALBUMIN SERPL-MCNC: 3.9 G/DL (ref 3.5–5.2)
ALBUMIN/GLOBULIN RATIO: 1.2 (ref 1–2.5)
ALP BLD-CCNC: 92 U/L (ref 35–104)
ALT SERPL-CCNC: 10 U/L (ref 5–33)
ANION GAP SERPL CALCULATED.3IONS-SCNC: 13 MMOL/L (ref 9–17)
AST SERPL-CCNC: 16 U/L
BASOPHILS # BLD: 1 % (ref 0–2)
BASOPHILS ABSOLUTE: 0.07 K/UL (ref 0–0.2)
BILIRUB SERPL-MCNC: 0.2 MG/DL (ref 0.3–1.2)
BUN BLDV-MCNC: 20 MG/DL (ref 6–20)
BUN/CREAT BLD: 22 (ref 9–20)
CALCIUM SERPL-MCNC: 8.9 MG/DL (ref 8.6–10.4)
CHLORIDE BLD-SCNC: 98 MMOL/L (ref 98–107)
CO2: 26 MMOL/L (ref 20–31)
CREAT SERPL-MCNC: 0.91 MG/DL (ref 0.5–0.9)
DIFFERENTIAL TYPE: ABNORMAL
EKG ATRIAL RATE: 94 BPM
EKG P AXIS: 55 DEGREES
EKG P-R INTERVAL: 144 MS
EKG Q-T INTERVAL: 344 MS
EKG QRS DURATION: 88 MS
EKG QTC CALCULATION (BAZETT): 430 MS
EKG R AXIS: 38 DEGREES
EKG T AXIS: 56 DEGREES
EKG VENTRICULAR RATE: 94 BPM
EOSINOPHILS RELATIVE PERCENT: 3 % (ref 1–4)
GFR AFRICAN AMERICAN: >60 ML/MIN
GFR NON-AFRICAN AMERICAN: >60 ML/MIN
GFR SERPL CREATININE-BSD FRML MDRD: ABNORMAL ML/MIN/{1.73_M2}
GFR SERPL CREATININE-BSD FRML MDRD: ABNORMAL ML/MIN/{1.73_M2}
GLUCOSE BLD-MCNC: 244 MG/DL (ref 70–99)
HCT VFR BLD CALC: 39.9 % (ref 36.3–47.1)
HEMOGLOBIN: 12.2 G/DL (ref 11.9–15.1)
IMMATURE GRANULOCYTES: 0 %
LYMPHOCYTES # BLD: 9 % (ref 24–43)
MCH RBC QN AUTO: 28.6 PG (ref 25.2–33.5)
MCHC RBC AUTO-ENTMCNC: 30.6 G/DL (ref 28.4–34.8)
MCV RBC AUTO: 93.4 FL (ref 82.6–102.9)
MONOCYTES # BLD: 7 % (ref 3–12)
NRBC AUTOMATED: 0 PER 100 WBC
PDW BLD-RTO: 14 % (ref 11.8–14.4)
PLATELET # BLD: 209 K/UL (ref 138–453)
PLATELET ESTIMATE: ABNORMAL
PMV BLD AUTO: 12.3 FL (ref 8.1–13.5)
POTASSIUM SERPL-SCNC: 4.1 MMOL/L (ref 3.7–5.3)
RBC # BLD: 4.27 M/UL (ref 3.95–5.11)
RBC # BLD: ABNORMAL 10*6/UL
SEG NEUTROPHILS: 80 % (ref 36–65)
SEGMENTED NEUTROPHILS ABSOLUTE COUNT: 7.04 K/UL (ref 1.5–8.1)
SODIUM BLD-SCNC: 137 MMOL/L (ref 135–144)
TOTAL PROTEIN: 7.2 G/DL (ref 6.4–8.3)
TSH SERPL DL<=0.05 MIU/L-ACNC: 12.17 MIU/L (ref 0.3–5)
WBC # BLD: 8.8 K/UL (ref 3.5–11.3)
WBC # BLD: ABNORMAL 10*3/UL

## 2018-12-05 PROCEDURE — 93005 ELECTROCARDIOGRAM TRACING: CPT

## 2018-12-05 PROCEDURE — 36415 COLL VENOUS BLD VENIPUNCTURE: CPT

## 2018-12-05 PROCEDURE — 85025 COMPLETE CBC W/AUTO DIFF WBC: CPT

## 2018-12-05 PROCEDURE — 80053 COMPREHEN METABOLIC PANEL: CPT

## 2018-12-05 PROCEDURE — 84443 ASSAY THYROID STIM HORMONE: CPT

## 2018-12-05 PROCEDURE — 99284 EMERGENCY DEPT VISIT MOD MDM: CPT

## 2018-12-05 NOTE — ED NOTES
States awoken from sleep with sound of heart beating in her ears. Attempted vagal maneuvers at home PTA, unsuccessful and worsened with change in position.        Jdui Sainz RN  12/05/18 0317

## 2018-12-05 NOTE — ED PROVIDER NOTES
[facility]  EMERGENCY DEPARTMENT ENCOUNTER   CHIEF COMPLAINT   Chief Complaint   Patient presents with    Tachycardia     states heart racing, nausea; hx SVT      HPI   Rob Hernandez is a 66 y.o. female who presents after palpitations. They woke her from sleep. She also has thyroid problems and is wondering of the status of her thyroid. She had no chest pain. She could hear her heart in her ears. When it was happening her pulse ox said her rate was 130s. She has history of svt. .She also notes constipation. She is on a ketogenic diet and wants an enema  Kit. REVIEW OF SYSTEMS   Cardiac: No Chest Pain, palpitations  Neurologic: no Headache  Respiratory: No SOB  GI: No abdominal pain or vomiting   General: Denies Fever  All other review of systems otherwise negative. PAST MEDICAL & SURGICAL HISTORY   Past Medical History:   Diagnosis Date    Alveolar hypoventilation     Dermatitis herpetiformis     GERD (gastroesophageal reflux disease)     Gout     Hearing loss     Hyperlipidemia     Hypertension     Hypothyroidism     Morbid obesity ([address]     Multiple sclerosis (HCC)     Neuropathy     MINH (obstructive sleep apnea)     intolerant of cpap    PAD (peripheral artery disease) (HCC)     SVT (supraventricular tachycardia) (HCC)     Type II or unspecified type diabetes mellitus without mention of complication, not stated as uncontrolled      Past Surgical History:   Procedure Laterality Date    CARDIAC CATHETERIZATION Left 2018    right radial/[facility]/Dr Pollard    CARPAL TUNNEL RELEASE      left    CERVICAL DISC SURGERY       SECTION          DILATION AND CURETTAGE OF UTERUS      HYSTERECTOMY  2006    cervical dysplasia      CURRENT MEDICATIONS   Current Outpatient Rx   Medication Sig Dispense Refill    furosemide (LASIX) 20 MG tablet Take 20 mg by mouth daily      nitroGLYCERIN (NITROSTAT) 0.4 MG SL tablet up to max of 3 total doses.  If no relief after 1

## 2018-12-06 ENCOUNTER — APPOINTMENT (OUTPATIENT)
Dept: GENERAL RADIOLOGY | Age: 58
End: 2018-12-06
Payer: MEDICARE

## 2018-12-06 ENCOUNTER — HOSPITAL ENCOUNTER (EMERGENCY)
Age: 58
Discharge: ANOTHER HOSPITAL | End: 2018-12-06
Attending: EMERGENCY MEDICINE
Payer: MEDICARE

## 2018-12-06 ENCOUNTER — HOSPITAL ENCOUNTER (INPATIENT)
Age: 58
LOS: 2 days | Discharge: HOME | DRG: 247 | End: 2018-12-08
Attending: FAMILY MEDICINE | Admitting: FAMILY MEDICINE
Payer: MEDICARE

## 2018-12-06 VITALS
WEIGHT: 190 LBS | BODY MASS INDEX: 31.65 KG/M2 | SYSTOLIC BLOOD PRESSURE: 128 MMHG | HEART RATE: 76 BPM | DIASTOLIC BLOOD PRESSURE: 73 MMHG | OXYGEN SATURATION: 99 % | TEMPERATURE: 98.4 F | HEIGHT: 65 IN | RESPIRATION RATE: 15 BRPM

## 2018-12-06 DIAGNOSIS — I25.110 CORONARY ARTERY DISEASE INVOLVING NATIVE CORONARY ARTERY OF NATIVE HEART WITH UNSTABLE ANGINA PECTORIS (HCC): ICD-10-CM

## 2018-12-06 DIAGNOSIS — I20.0 UNSTABLE ANGINA (HCC): Primary | ICD-10-CM

## 2018-12-06 LAB
-: ABNORMAL
ABSOLUTE EOS #: 0.25 K/UL (ref 0–0.44)
ABSOLUTE IMMATURE GRANULOCYTE: <0.03 K/UL (ref 0–0.3)
ABSOLUTE LYMPH #: 0.8 K/UL (ref 1.1–3.7)
ABSOLUTE MONO #: 0.38 K/UL (ref 0.1–1.2)
AMORPHOUS: ABNORMAL
ANION GAP SERPL CALCULATED.3IONS-SCNC: 13 MMOL/L (ref 9–17)
BACTERIA: ABNORMAL
BASOPHILS # BLD: 1 % (ref 0–2)
BASOPHILS ABSOLUTE: 0.05 K/UL (ref 0–0.2)
BILIRUBIN URINE: NEGATIVE
BUN BLDV-MCNC: 14 MG/DL (ref 6–20)
BUN/CREAT BLD: 16 (ref 9–20)
CALCIUM SERPL-MCNC: 9.5 MG/DL (ref 8.6–10.4)
CASTS UA: ABNORMAL /LPF
CHLORIDE BLD-SCNC: 99 MMOL/L (ref 98–107)
CO2: 28 MMOL/L (ref 20–31)
COLOR: YELLOW
COMMENT UA: ABNORMAL
CREAT SERPL-MCNC: 0.87 MG/DL (ref 0.5–0.9)
CRYSTALS, UA: ABNORMAL /HPF
DIFFERENTIAL TYPE: ABNORMAL
EKG ATRIAL RATE: 74 BPM
EKG P AXIS: 30 DEGREES
EKG P-R INTERVAL: 128 MS
EKG Q-T INTERVAL: 380 MS
EKG QRS DURATION: 86 MS
EKG QTC CALCULATION (BAZETT): 421 MS
EKG R AXIS: 16 DEGREES
EKG T AXIS: 27 DEGREES
EKG VENTRICULAR RATE: 74 BPM
EOSINOPHILS RELATIVE PERCENT: 4 % (ref 1–4)
EPITHELIAL CELLS UA: ABNORMAL /HPF (ref 0–25)
GFR AFRICAN AMERICAN: >60 ML/MIN
GFR NON-AFRICAN AMERICAN: >60 ML/MIN
GFR SERPL CREATININE-BSD FRML MDRD: ABNORMAL ML/MIN/{1.73_M2}
GFR SERPL CREATININE-BSD FRML MDRD: ABNORMAL ML/MIN/{1.73_M2}
GLUCOSE BLD-MCNC: 235 MG/DL (ref 70–99)
GLUCOSE URINE: NEGATIVE
HCT VFR BLD CALC: 41.8 % (ref 36.3–47.1)
HEMOGLOBIN: 13.1 G/DL (ref 11.9–15.1)
IMMATURE GRANULOCYTES: 0 %
INR BLD: 1 (ref 0.9–1.2)
KETONES, URINE: NEGATIVE
LEUKOCYTE ESTERASE, URINE: ABNORMAL
LYMPHOCYTES # BLD: 14 % (ref 24–43)
MCH RBC QN AUTO: 28.8 PG (ref 25.2–33.5)
MCHC RBC AUTO-ENTMCNC: 31.3 G/DL (ref 28.4–34.8)
MCV RBC AUTO: 91.9 FL (ref 82.6–102.9)
MONOCYTES # BLD: 7 % (ref 3–12)
MUCUS: ABNORMAL
NITRITE, URINE: NEGATIVE
NRBC AUTOMATED: 0 PER 100 WBC
OTHER OBSERVATIONS UA: ABNORMAL
PARTIAL THROMBOPLASTIN TIME: 31.1 SEC (ref 23.2–34.4)
PDW BLD-RTO: 13.9 % (ref 11.8–14.4)
PH UA: 6.5 (ref 5–9)
PLATELET # BLD: 234 K/UL (ref 138–453)
PLATELET ESTIMATE: ABNORMAL
PMV BLD AUTO: 11.9 FL (ref 8.1–13.5)
POTASSIUM SERPL-SCNC: 4.1 MMOL/L (ref 3.7–5.3)
PROTEIN UA: NEGATIVE
PROTHROMBIN TIME: 10 SEC (ref 9.7–12.2)
RBC # BLD: 4.55 M/UL (ref 3.95–5.11)
RBC # BLD: ABNORMAL 10*6/UL
RBC UA: ABNORMAL /HPF (ref 0–2)
RENAL EPITHELIAL, UA: ABNORMAL /HPF
SEG NEUTROPHILS: 74 % (ref 36–65)
SEGMENTED NEUTROPHILS ABSOLUTE COUNT: 4.21 K/UL (ref 1.5–8.1)
SODIUM BLD-SCNC: 140 MMOL/L (ref 135–144)
SPECIFIC GRAVITY UA: <1.005 (ref 1.01–1.02)
TRICHOMONAS: ABNORMAL
TROPONIN INTERP: NORMAL
TROPONIN T: <0.03 NG/ML
TURBIDITY: CLEAR
URINE HGB: NEGATIVE
UROBILINOGEN, URINE: NORMAL
WBC # BLD: 5.7 K/UL (ref 3.5–11.3)
WBC # BLD: ABNORMAL 10*3/UL
WBC UA: ABNORMAL /HPF (ref 0–5)
YEAST: ABNORMAL

## 2018-12-06 PROCEDURE — 85025 COMPLETE CBC W/AUTO DIFF WBC: CPT

## 2018-12-06 PROCEDURE — 93005 ELECTROCARDIOGRAM TRACING: CPT

## 2018-12-06 PROCEDURE — 6370000000 HC RX 637 (ALT 250 FOR IP): Performed by: CLINICAL NURSE SPECIALIST

## 2018-12-06 PROCEDURE — 96374 THER/PROPH/DIAG INJ IV PUSH: CPT

## 2018-12-06 PROCEDURE — 84484 ASSAY OF TROPONIN QUANT: CPT

## 2018-12-06 PROCEDURE — 71045 X-RAY EXAM CHEST 1 VIEW: CPT

## 2018-12-06 PROCEDURE — 6370000000 HC RX 637 (ALT 250 FOR IP): Performed by: EMERGENCY MEDICINE

## 2018-12-06 PROCEDURE — 2500000003 HC RX 250 WO HCPCS: Performed by: CLINICAL NURSE SPECIALIST

## 2018-12-06 PROCEDURE — 36415 COLL VENOUS BLD VENIPUNCTURE: CPT

## 2018-12-06 PROCEDURE — 99284 EMERGENCY DEPT VISIT MOD MDM: CPT | Performed by: INTERNAL MEDICINE

## 2018-12-06 PROCEDURE — 2060000000 HC ICU INTERMEDIATE R&B

## 2018-12-06 PROCEDURE — 96375 TX/PRO/DX INJ NEW DRUG ADDON: CPT

## 2018-12-06 PROCEDURE — 6360000002 HC RX W HCPCS: Performed by: EMERGENCY MEDICINE

## 2018-12-06 PROCEDURE — 85730 THROMBOPLASTIN TIME PARTIAL: CPT

## 2018-12-06 PROCEDURE — 99285 EMERGENCY DEPT VISIT HI MDM: CPT

## 2018-12-06 PROCEDURE — 81001 URINALYSIS AUTO W/SCOPE: CPT

## 2018-12-06 PROCEDURE — 2580000003 HC RX 258: Performed by: CLINICAL NURSE SPECIALIST

## 2018-12-06 PROCEDURE — 85610 PROTHROMBIN TIME: CPT

## 2018-12-06 PROCEDURE — 80048 BASIC METABOLIC PNL TOTAL CA: CPT

## 2018-12-06 PROCEDURE — 6360000002 HC RX W HCPCS: Performed by: PHYSICIAN ASSISTANT

## 2018-12-06 RX ORDER — BACLOFEN 10 MG/1
10 TABLET ORAL 4 TIMES DAILY PRN
Status: DISCONTINUED | OUTPATIENT
Start: 2018-12-06 | End: 2018-12-07

## 2018-12-06 RX ORDER — HEPARIN SODIUM 10000 [USP'U]/100ML
1000 INJECTION, SOLUTION INTRAVENOUS CONTINUOUS
Status: DISCONTINUED | OUTPATIENT
Start: 2018-12-06 | End: 2018-12-06 | Stop reason: HOSPADM

## 2018-12-06 RX ORDER — HYDROCODONE BITARTRATE AND ACETAMINOPHEN 5; 325 MG/1; MG/1
1 TABLET ORAL EVERY 4 HOURS PRN
Status: DISCONTINUED | OUTPATIENT
Start: 2018-12-06 | End: 2018-12-08 | Stop reason: HOSPADM

## 2018-12-06 RX ORDER — FAMOTIDINE 20 MG/1
20 TABLET, FILM COATED ORAL 2 TIMES DAILY
Status: DISCONTINUED | OUTPATIENT
Start: 2018-12-06 | End: 2018-12-08 | Stop reason: HOSPADM

## 2018-12-06 RX ORDER — ASPIRIN 81 MG/1
324 TABLET, CHEWABLE ORAL ONCE
Status: DISCONTINUED | OUTPATIENT
Start: 2018-12-06 | End: 2018-12-07

## 2018-12-06 RX ORDER — NITROGLYCERIN 0.4 MG/1
0.4 TABLET SUBLINGUAL EVERY 5 MIN PRN
Status: DISCONTINUED | OUTPATIENT
Start: 2018-12-06 | End: 2018-12-08 | Stop reason: HOSPADM

## 2018-12-06 RX ORDER — FUROSEMIDE 20 MG/1
20 TABLET ORAL DAILY
Status: DISCONTINUED | OUTPATIENT
Start: 2018-12-07 | End: 2018-12-08 | Stop reason: HOSPADM

## 2018-12-06 RX ORDER — MORPHINE SULFATE 4 MG/ML
4 INJECTION, SOLUTION INTRAMUSCULAR; INTRAVENOUS ONCE
Status: COMPLETED | OUTPATIENT
Start: 2018-12-06 | End: 2018-12-06

## 2018-12-06 RX ORDER — POTASSIUM CHLORIDE 20 MEQ/1
40 TABLET, EXTENDED RELEASE ORAL PRN
Status: DISCONTINUED | OUTPATIENT
Start: 2018-12-06 | End: 2018-12-08 | Stop reason: HOSPADM

## 2018-12-06 RX ORDER — SODIUM CHLORIDE 0.9 % (FLUSH) 0.9 %
10 SYRINGE (ML) INJECTION EVERY 12 HOURS SCHEDULED
Status: DISCONTINUED | OUTPATIENT
Start: 2018-12-06 | End: 2018-12-08 | Stop reason: HOSPADM

## 2018-12-06 RX ORDER — POTASSIUM CHLORIDE 7.45 MG/ML
10 INJECTION INTRAVENOUS PRN
Status: DISCONTINUED | OUTPATIENT
Start: 2018-12-06 | End: 2018-12-08 | Stop reason: HOSPADM

## 2018-12-06 RX ORDER — ONDANSETRON 2 MG/ML
4 INJECTION INTRAMUSCULAR; INTRAVENOUS EVERY 6 HOURS PRN
Status: DISCONTINUED | OUTPATIENT
Start: 2018-12-06 | End: 2018-12-08 | Stop reason: HOSPADM

## 2018-12-06 RX ORDER — MAGNESIUM SULFATE 1 G/100ML
1 INJECTION INTRAVENOUS PRN
Status: DISCONTINUED | OUTPATIENT
Start: 2018-12-06 | End: 2018-12-08 | Stop reason: HOSPADM

## 2018-12-06 RX ORDER — HYDROCODONE BITARTRATE AND ACETAMINOPHEN 5; 325 MG/1; MG/1
2 TABLET ORAL EVERY 4 HOURS PRN
Status: DISCONTINUED | OUTPATIENT
Start: 2018-12-06 | End: 2018-12-08 | Stop reason: HOSPADM

## 2018-12-06 RX ORDER — ONDANSETRON 2 MG/ML
4 INJECTION INTRAMUSCULAR; INTRAVENOUS ONCE
Status: COMPLETED | OUTPATIENT
Start: 2018-12-06 | End: 2018-12-06

## 2018-12-06 RX ORDER — LEVOTHYROXINE SODIUM 0.1 MG/1
200 TABLET ORAL DAILY
Status: DISCONTINUED | OUTPATIENT
Start: 2018-12-07 | End: 2018-12-08 | Stop reason: HOSPADM

## 2018-12-06 RX ORDER — SODIUM CHLORIDE 0.9 % (FLUSH) 0.9 %
10 SYRINGE (ML) INJECTION PRN
Status: DISCONTINUED | OUTPATIENT
Start: 2018-12-06 | End: 2018-12-08 | Stop reason: HOSPADM

## 2018-12-06 RX ORDER — ATORVASTATIN CALCIUM 40 MG/1
40 TABLET, FILM COATED ORAL NIGHTLY
Status: DISCONTINUED | OUTPATIENT
Start: 2018-12-06 | End: 2018-12-08 | Stop reason: HOSPADM

## 2018-12-06 RX ORDER — HEPARIN SODIUM 10000 [USP'U]/100ML
18 INJECTION, SOLUTION INTRAVENOUS CONTINUOUS
Status: DISCONTINUED | OUTPATIENT
Start: 2018-12-06 | End: 2018-12-07

## 2018-12-06 RX ORDER — POTASSIUM CHLORIDE 20MEQ/15ML
40 LIQUID (ML) ORAL PRN
Status: DISCONTINUED | OUTPATIENT
Start: 2018-12-06 | End: 2018-12-08 | Stop reason: HOSPADM

## 2018-12-06 RX ORDER — HEPARIN SODIUM 5000 [USP'U]/ML
4000 INJECTION, SOLUTION INTRAVENOUS; SUBCUTANEOUS ONCE
Status: COMPLETED | OUTPATIENT
Start: 2018-12-06 | End: 2018-12-06

## 2018-12-06 RX ORDER — UREA 10 %
1000 LOTION (ML) TOPICAL EVERY MORNING
Status: DISCONTINUED | OUTPATIENT
Start: 2018-12-07 | End: 2018-12-08 | Stop reason: HOSPADM

## 2018-12-06 RX ORDER — DEXTROSE, SODIUM CHLORIDE, AND POTASSIUM CHLORIDE 5; .45; .15 G/100ML; G/100ML; G/100ML
INJECTION INTRAVENOUS CONTINUOUS
Status: DISCONTINUED | OUTPATIENT
Start: 2018-12-06 | End: 2018-12-08 | Stop reason: HOSPADM

## 2018-12-06 RX ORDER — HEPARIN SODIUM 5000 [USP'U]/ML
INJECTION, SOLUTION INTRAVENOUS; SUBCUTANEOUS
Status: DISCONTINUED
Start: 2018-12-06 | End: 2018-12-06 | Stop reason: HOSPADM

## 2018-12-06 RX ADMIN — HEPARIN SODIUM 4000 UNITS: 5000 INJECTION, SOLUTION INTRAVENOUS; SUBCUTANEOUS at 14:58

## 2018-12-06 RX ADMIN — Medication 10 ML: at 23:11

## 2018-12-06 RX ADMIN — ONDANSETRON 4 MG: 2 INJECTION INTRAMUSCULAR; INTRAVENOUS at 20:16

## 2018-12-06 RX ADMIN — POTASSIUM CHLORIDE, DEXTROSE MONOHYDRATE AND SODIUM CHLORIDE: 150; 5; 450 INJECTION, SOLUTION INTRAVENOUS at 23:18

## 2018-12-06 RX ADMIN — MORPHINE SULFATE 4 MG: 4 INJECTION, SOLUTION INTRAMUSCULAR; INTRAVENOUS at 14:58

## 2018-12-06 RX ADMIN — MORPHINE SULFATE 4 MG: 4 INJECTION, SOLUTION INTRAMUSCULAR; INTRAVENOUS at 20:16

## 2018-12-06 RX ADMIN — HEPARIN SODIUM AND DEXTROSE 1000 UNITS/HR: 10000; 5 INJECTION INTRAVENOUS at 14:59

## 2018-12-06 RX ADMIN — TICAGRELOR 180 MG: 90 TABLET ORAL at 14:58

## 2018-12-06 RX ADMIN — DESMOPRESSIN ACETATE 40 MG: 0.2 TABLET ORAL at 23:11

## 2018-12-06 RX ADMIN — FAMOTIDINE 20 MG: 20 TABLET, FILM COATED ORAL at 23:11

## 2018-12-06 RX ADMIN — METOPROLOL TARTRATE 25 MG: 25 TABLET ORAL at 23:11

## 2018-12-06 ASSESSMENT — ENCOUNTER SYMPTOMS
SHORTNESS OF BREATH: 0
ABDOMINAL DISTENTION: 1
ABDOMINAL PAIN: 0
WHEEZING: 0
COLOR CHANGE: 0
BACK PAIN: 0
COUGH: 0
CONSTIPATION: 1

## 2018-12-06 ASSESSMENT — PAIN SCALES - GENERAL
PAINLEVEL_OUTOF10: 3
PAINLEVEL_OUTOF10: 0
PAINLEVEL_OUTOF10: 0

## 2018-12-06 ASSESSMENT — PAIN DESCRIPTION - PAIN TYPE: TYPE: ACUTE PAIN

## 2018-12-06 ASSESSMENT — PAIN DESCRIPTION - FREQUENCY: FREQUENCY: INTERMITTENT

## 2018-12-06 ASSESSMENT — PAIN DESCRIPTION - LOCATION: LOCATION: CHEST

## 2018-12-06 ASSESSMENT — PAIN DESCRIPTION - DESCRIPTORS: DESCRIPTORS: DISCOMFORT;PRESSURE

## 2018-12-06 NOTE — CONSULTS
the rare but potentially serious complication of liver or kidney damage. Although rare, I told her that this could be serious and therefore told her to stop the medication immediately and call if she developed any severe muscle aches or pains and she agreed to do so. Once again, thank you for allowing me to participate in this patients care. Please do not hesitate to contact me could I be of further assistance. Sincerely,  Dama Gilford MD, F.A.C.C. [id number] [place] [address] Cardiology Specialist, [address] [place] [address], [facility], [employer], [address] [place] [address]  Phone: [phone], Fax: [phone]    Based on the patients current medical conditions, I believe the risk of significant morbidity and mortality is: Intermediate to high.

## 2018-12-06 NOTE — ED NOTES
Cardiology called, spoke with Autumn. She will have one of the drs come over to see the patient     Soniya Pascual  12/06/18 5535

## 2018-12-06 NOTE — ED PROVIDER NOTES
[facility] [address] [facility] SURGERY       SECTION          DILATION AND CURETTAGE OF UTERUS      HYSTERECTOMY  2006    cervical dysplasia       Social History     Social History    Marital status:      Spouse name: N/A    Number of children: N/A    Years of education: N/A     Occupational History          not working due to medical problems     Social History Main Topics    Smoking status: Never Smoker    Smokeless tobacco: Never Used    Alcohol use No      Comment: occ    Drug use: No    Sexual activity: Yes     Partners: Male     Other Topics Concern    None     Social History Narrative    None       Procedures    MDM  the patient has moderate to severe 2 vessel disease. She has been put on all the medications for her heart. Dr. Manfred Srivastava has been here and is talking with Dr. Josh Manzanares to arrange transfer to [address] [place] [address]. Labs  Glucose 235. BUN 14 and creatinine 0.87. Normal electrolytes. WBCs 5.7 hemoglobin 13.1 with 74 neutrophils, 14 lymphs, 7 monocytes and 4 eosinophils. Troponin T is less than 0.03    Radiology chest x-ray shows no acute process. EKG Interpretation. Normal sinus rhythm at a rate of 74. Normal intervals. Axis is 16. Normal QRS. Flattening of the T-wave in lead 3 and aVF but no ST segment elevation. Summation      Patient Course:  Patient is being transferred to [address] [place] [address].     ED Medications administered this visit:    Medications   heparin 25,000 units in dextrose 5% 250 mL infusion (1,000 Units/hr Intravenous New Bag 18 1459)   heparin (porcine) 5000 UNIT/ML injection (not administered)   morphine injection 4 mg (4 mg Intravenous Given 18 1458)   ticagrelor (BRILINTA) tablet 180 mg (180 mg Oral Given 18 1458)   heparin (porcine) injection 4,000 Units (4,000 Units Intravenous Given 18 1458)       New Prescriptions from this visit:    New Prescriptions    No medications on file

## 2018-12-07 PROBLEM — I20.0 UNSTABLE ANGINA (HCC): Status: ACTIVE | Noted: 2018-12-07

## 2018-12-07 LAB
ACTIVATED CLOTTING TIME: 331 SEC (ref 79–149)
ALBUMIN SERPL-MCNC: 3.4 G/DL (ref 3.5–5.2)
ALBUMIN/GLOBULIN RATIO: 1.1 (ref 1–2.5)
ALP BLD-CCNC: 77 U/L (ref 35–104)
ALT SERPL-CCNC: 8 U/L (ref 5–33)
ANION GAP SERPL CALCULATED.3IONS-SCNC: 9 MMOL/L (ref 9–17)
AST SERPL-CCNC: 12 U/L
BILIRUB SERPL-MCNC: 0.17 MG/DL (ref 0.3–1.2)
BNP INTERPRETATION: NORMAL
BUN BLDV-MCNC: 14 MG/DL (ref 6–20)
BUN/CREAT BLD: ABNORMAL (ref 9–20)
CALCIUM SERPL-MCNC: 9.1 MG/DL (ref 8.6–10.4)
CHLORIDE BLD-SCNC: 101 MMOL/L (ref 98–107)
CHOLESTEROL/HDL RATIO: 2.5
CHOLESTEROL: 202 MG/DL
CO2: 29 MMOL/L (ref 20–31)
CREAT SERPL-MCNC: 0.99 MG/DL (ref 0.5–0.9)
EKG ATRIAL RATE: 95 BPM
EKG P-R INTERVAL: 182 MS
EKG Q-T INTERVAL: 352 MS
EKG QRS DURATION: 82 MS
EKG QTC CALCULATION (BAZETT): 442 MS
EKG R AXIS: 15 DEGREES
EKG T AXIS: 12 DEGREES
EKG VENTRICULAR RATE: 95 BPM
GFR AFRICAN AMERICAN: >60 ML/MIN
GFR NON-AFRICAN AMERICAN: 58 ML/MIN
GFR SERPL CREATININE-BSD FRML MDRD: ABNORMAL ML/MIN/{1.73_M2}
GFR SERPL CREATININE-BSD FRML MDRD: ABNORMAL ML/MIN/{1.73_M2}
GLUCOSE BLD-MCNC: 196 MG/DL (ref 70–99)
GLUCOSE BLD-MCNC: 206 MG/DL (ref 65–105)
GLUCOSE BLD-MCNC: 208 MG/DL (ref 65–105)
HCT VFR BLD CALC: 37.6 % (ref 36.3–47.1)
HDLC SERPL-MCNC: 80 MG/DL
HEMOGLOBIN: 11.7 G/DL (ref 11.9–15.1)
LDL CHOLESTEROL: 87 MG/DL (ref 0–130)
MAGNESIUM: 1.7 MG/DL (ref 1.6–2.6)
MCH RBC QN AUTO: 28.5 PG (ref 25.2–33.5)
MCHC RBC AUTO-ENTMCNC: 31.1 G/DL (ref 28.4–34.8)
MCV RBC AUTO: 91.5 FL (ref 82.6–102.9)
NRBC AUTOMATED: 0 PER 100 WBC
PARTIAL THROMBOPLASTIN TIME: 41.6 SEC (ref 20.5–30.5)
PARTIAL THROMBOPLASTIN TIME: 64.9 SEC (ref 20.5–30.5)
PDW BLD-RTO: 13.8 % (ref 11.8–14.4)
PLATELET # BLD: 230 K/UL (ref 138–453)
PMV BLD AUTO: 12.5 FL (ref 8.1–13.5)
POTASSIUM SERPL-SCNC: 4.2 MMOL/L (ref 3.7–5.3)
PRO-BNP: 229 PG/ML
RBC # BLD: 4.11 M/UL (ref 3.95–5.11)
SODIUM BLD-SCNC: 139 MMOL/L (ref 135–144)
THYROXINE, FREE: 1.09 NG/DL (ref 0.93–1.7)
TOTAL PROTEIN: 6.5 G/DL (ref 6.4–8.3)
TRIGL SERPL-MCNC: 177 MG/DL
TROPONIN INTERP: NORMAL
TROPONIN T: <0.03 NG/ML
TSH SERPL DL<=0.05 MIU/L-ACNC: 15.21 MIU/L (ref 0.3–5)
VLDLC SERPL CALC-MCNC: ABNORMAL MG/DL (ref 1–30)
WBC # BLD: 6.9 K/UL (ref 3.5–11.3)

## 2018-12-07 PROCEDURE — C1894 INTRO/SHEATH, NON-LASER: HCPCS

## 2018-12-07 PROCEDURE — 6370000000 HC RX 637 (ALT 250 FOR IP): Performed by: NURSE PRACTITIONER

## 2018-12-07 PROCEDURE — 83880 ASSAY OF NATRIURETIC PEPTIDE: CPT

## 2018-12-07 PROCEDURE — 6360000002 HC RX W HCPCS: Performed by: INTERNAL MEDICINE

## 2018-12-07 PROCEDURE — 6370000000 HC RX 637 (ALT 250 FOR IP)

## 2018-12-07 PROCEDURE — 36415 COLL VENOUS BLD VENIPUNCTURE: CPT

## 2018-12-07 PROCEDURE — 93454 CORONARY ARTERY ANGIO S&I: CPT | Performed by: INTERNAL MEDICINE

## 2018-12-07 PROCEDURE — 85730 THROMBOPLASTIN TIME PARTIAL: CPT

## 2018-12-07 PROCEDURE — 2500000003 HC RX 250 WO HCPCS

## 2018-12-07 PROCEDURE — 80053 COMPREHEN METABOLIC PANEL: CPT

## 2018-12-07 PROCEDURE — C1769 GUIDE WIRE: HCPCS

## 2018-12-07 PROCEDURE — 2580000003 HC RX 258: Performed by: INTERNAL MEDICINE

## 2018-12-07 PROCEDURE — 027135Z DILATION OF CORONARY ARTERY, TWO ARTERIES WITH TWO DRUG-ELUTING INTRALUMINAL DEVICES, PERCUTANEOUS APPROACH: ICD-10-PCS | Performed by: INTERNAL MEDICINE

## 2018-12-07 PROCEDURE — 6360000002 HC RX W HCPCS

## 2018-12-07 PROCEDURE — 85347 COAGULATION TIME ACTIVATED: CPT

## 2018-12-07 PROCEDURE — 84484 ASSAY OF TROPONIN QUANT: CPT

## 2018-12-07 PROCEDURE — 85027 COMPLETE CBC AUTOMATED: CPT

## 2018-12-07 PROCEDURE — 6360000002 HC RX W HCPCS: Performed by: CLINICAL NURSE SPECIALIST

## 2018-12-07 PROCEDURE — 83735 ASSAY OF MAGNESIUM: CPT

## 2018-12-07 PROCEDURE — 82947 ASSAY GLUCOSE BLOOD QUANT: CPT

## 2018-12-07 PROCEDURE — 4A023N7 MEASUREMENT OF CARDIAC SAMPLING AND PRESSURE, LEFT HEART, PERCUTANEOUS APPROACH: ICD-10-PCS | Performed by: INTERNAL MEDICINE

## 2018-12-07 PROCEDURE — 2580000003 HC RX 258: Performed by: CLINICAL NURSE SPECIALIST

## 2018-12-07 PROCEDURE — 99223 1ST HOSP IP/OBS HIGH 75: CPT | Performed by: FAMILY MEDICINE

## 2018-12-07 PROCEDURE — 84439 ASSAY OF FREE THYROXINE: CPT

## 2018-12-07 PROCEDURE — C1874 STENT, COATED/COV W/DEL SYS: HCPCS

## 2018-12-07 PROCEDURE — C1887 CATHETER, GUIDING: HCPCS

## 2018-12-07 PROCEDURE — C9600 PERC DRUG-EL COR STENT SING: HCPCS | Performed by: INTERNAL MEDICINE

## 2018-12-07 PROCEDURE — 80061 LIPID PANEL: CPT

## 2018-12-07 PROCEDURE — 6360000004 HC RX CONTRAST MEDICATION

## 2018-12-07 PROCEDURE — 94762 N-INVAS EAR/PLS OXIMTRY CONT: CPT

## 2018-12-07 PROCEDURE — 84443 ASSAY THYROID STIM HORMONE: CPT

## 2018-12-07 PROCEDURE — B2111ZZ FLUOROSCOPY OF MULTIPLE CORONARY ARTERIES USING LOW OSMOLAR CONTRAST: ICD-10-PCS | Performed by: INTERNAL MEDICINE

## 2018-12-07 PROCEDURE — B2151ZZ FLUOROSCOPY OF LEFT HEART USING LOW OSMOLAR CONTRAST: ICD-10-PCS | Performed by: INTERNAL MEDICINE

## 2018-12-07 PROCEDURE — 2060000000 HC ICU INTERMEDIATE R&B

## 2018-12-07 PROCEDURE — 2709999900 HC NON-CHARGEABLE SUPPLY

## 2018-12-07 PROCEDURE — 6370000000 HC RX 637 (ALT 250 FOR IP): Performed by: CLINICAL NURSE SPECIALIST

## 2018-12-07 PROCEDURE — C1725 CATH, TRANSLUMIN NON-LASER: HCPCS

## 2018-12-07 RX ORDER — DEXTROSE MONOHYDRATE 25 G/50ML
12.5 INJECTION, SOLUTION INTRAVENOUS PRN
Status: DISCONTINUED | OUTPATIENT
Start: 2018-12-07 | End: 2018-12-08 | Stop reason: HOSPADM

## 2018-12-07 RX ORDER — SODIUM CHLORIDE 9 MG/ML
INJECTION, SOLUTION INTRAVENOUS CONTINUOUS
Status: DISCONTINUED | OUTPATIENT
Start: 2018-12-07 | End: 2018-12-08 | Stop reason: HOSPADM

## 2018-12-07 RX ORDER — HEPARIN SODIUM 1000 [USP'U]/ML
4000 INJECTION, SOLUTION INTRAVENOUS; SUBCUTANEOUS PRN
Status: DISCONTINUED | OUTPATIENT
Start: 2018-12-07 | End: 2018-12-08 | Stop reason: HOSPADM

## 2018-12-07 RX ORDER — DEXTROSE MONOHYDRATE 50 MG/ML
100 INJECTION, SOLUTION INTRAVENOUS PRN
Status: DISCONTINUED | OUTPATIENT
Start: 2018-12-07 | End: 2018-12-08 | Stop reason: HOSPADM

## 2018-12-07 RX ORDER — ASPIRIN 81 MG/1
81 TABLET ORAL DAILY
Status: DISCONTINUED | OUTPATIENT
Start: 2018-12-08 | End: 2018-12-08 | Stop reason: HOSPADM

## 2018-12-07 RX ORDER — LABETALOL HYDROCHLORIDE 5 MG/ML
10 INJECTION, SOLUTION INTRAVENOUS EVERY 30 MIN PRN
Status: DISCONTINUED | OUTPATIENT
Start: 2018-12-07 | End: 2018-12-08 | Stop reason: HOSPADM

## 2018-12-07 RX ORDER — NICOTINE POLACRILEX 4 MG
15 LOZENGE BUCCAL PRN
Status: DISCONTINUED | OUTPATIENT
Start: 2018-12-07 | End: 2018-12-08 | Stop reason: HOSPADM

## 2018-12-07 RX ORDER — SODIUM CHLORIDE 0.9 % (FLUSH) 0.9 %
10 SYRINGE (ML) INJECTION EVERY 12 HOURS SCHEDULED
Status: DISCONTINUED | OUTPATIENT
Start: 2018-12-07 | End: 2018-12-08 | Stop reason: HOSPADM

## 2018-12-07 RX ORDER — BACLOFEN 10 MG/1
20 TABLET ORAL 4 TIMES DAILY PRN
Status: DISCONTINUED | OUTPATIENT
Start: 2018-12-07 | End: 2018-12-08 | Stop reason: HOSPADM

## 2018-12-07 RX ORDER — HEPARIN SODIUM 10000 [USP'U]/100ML
1000 INJECTION, SOLUTION INTRAVENOUS CONTINUOUS
Status: DISCONTINUED | OUTPATIENT
Start: 2018-12-07 | End: 2018-12-08 | Stop reason: HOSPADM

## 2018-12-07 RX ORDER — SODIUM CHLORIDE 0.9 % (FLUSH) 0.9 %
10 SYRINGE (ML) INJECTION PRN
Status: DISCONTINUED | OUTPATIENT
Start: 2018-12-07 | End: 2018-12-08 | Stop reason: HOSPADM

## 2018-12-07 RX ORDER — ACETAMINOPHEN 325 MG/1
650 TABLET ORAL EVERY 4 HOURS PRN
Status: DISCONTINUED | OUTPATIENT
Start: 2018-12-07 | End: 2018-12-08 | Stop reason: HOSPADM

## 2018-12-07 RX ORDER — HEPARIN SODIUM 1000 [USP'U]/ML
2000 INJECTION, SOLUTION INTRAVENOUS; SUBCUTANEOUS PRN
Status: DISCONTINUED | OUTPATIENT
Start: 2018-12-07 | End: 2018-12-08 | Stop reason: HOSPADM

## 2018-12-07 RX ORDER — CLOPIDOGREL BISULFATE 75 MG/1
75 TABLET ORAL DAILY
Status: DISCONTINUED | OUTPATIENT
Start: 2018-12-08 | End: 2018-12-08 | Stop reason: HOSPADM

## 2018-12-07 RX ADMIN — DESMOPRESSIN ACETATE 40 MG: 0.2 TABLET ORAL at 20:12

## 2018-12-07 RX ADMIN — FAMOTIDINE 20 MG: 20 TABLET, FILM COATED ORAL at 08:23

## 2018-12-07 RX ADMIN — FUROSEMIDE 20 MG: 20 TABLET ORAL at 08:23

## 2018-12-07 RX ADMIN — Medication 10 ML: at 21:34

## 2018-12-07 RX ADMIN — SODIUM CHLORIDE: 9 INJECTION, SOLUTION INTRAVENOUS at 17:57

## 2018-12-07 RX ADMIN — ONDANSETRON 4 MG: 2 INJECTION INTRAMUSCULAR; INTRAVENOUS at 17:58

## 2018-12-07 RX ADMIN — INSULIN LISPRO 1 UNITS: 100 INJECTION, SOLUTION INTRAVENOUS; SUBCUTANEOUS at 20:54

## 2018-12-07 RX ADMIN — ASPIRIN 325 MG: 325 TABLET, COATED ORAL at 08:23

## 2018-12-07 RX ADMIN — METOPROLOL TARTRATE 25 MG: 25 TABLET ORAL at 08:23

## 2018-12-07 RX ADMIN — BACLOFEN 20 MG: 10 TABLET ORAL at 18:38

## 2018-12-07 RX ADMIN — SODIUM CHLORIDE, PRESERVATIVE FREE 10 ML: 5 INJECTION INTRAVENOUS at 21:34

## 2018-12-07 RX ADMIN — METOPROLOL TARTRATE 25 MG: 25 TABLET ORAL at 20:12

## 2018-12-07 RX ADMIN — HEPARIN SODIUM 2000 UNITS: 1000 INJECTION, SOLUTION INTRAVENOUS; SUBCUTANEOUS at 04:38

## 2018-12-07 RX ADMIN — FAMOTIDINE 20 MG: 20 TABLET, FILM COATED ORAL at 20:12

## 2018-12-07 RX ADMIN — BACLOFEN 10 MG: 10 TABLET ORAL at 04:38

## 2018-12-07 RX ADMIN — LEVOTHYROXINE SODIUM 200 MCG: 100 TABLET ORAL at 08:23

## 2018-12-07 ASSESSMENT — ENCOUNTER SYMPTOMS
CONSTIPATION: 0
SINUS PRESSURE: 0
SORE THROAT: 0
DIARRHEA: 0
COUGH: 0
WHEEZING: 0
BLOOD IN STOOL: 0
SHORTNESS OF BREATH: 0
VOICE CHANGE: 0
NAUSEA: 0
ABDOMINAL PAIN: 0
VOMITING: 0

## 2018-12-07 ASSESSMENT — PAIN SCALES - GENERAL: PAINLEVEL_OUTOF10: 0

## 2018-12-07 NOTE — PLAN OF CARE
Problem: Falls - Risk of:  Goal: Will remain free from falls  Will remain free from falls   Outcome: Ongoing  Fall risk assessment completed as charted. Patient in bed; bed locked with bedside table and call light within reach. Patient oriented to unit, room, call light, phone, staff and care plan. Patient remains free from falls at this time, will continue to monitor hourly.       Problem: DAILY CARE  Goal: Daily care needs are met  Outcome: Ongoing      Problem: PAIN  Goal: Patient's pain/discomfort is manageable  Outcome: Ongoing      Problem: SKIN INTEGRITY  Goal: Skin integrity is maintained or improved  Outcome: Ongoing

## 2018-12-07 NOTE — CONSULTS
Attestation signed by      Attending Physician Statement:    I have discussed the care of  [facility] , including pertinent history and exam findings, with the Cardiology fellow/resident. I have seen and examined the patient and the key elements of all parts of the encounter have been performed by me. I agree with the assessment, plan and orders as documented by the fellow/resident, after I modified exam findings and plan of treatments, and the final version is my approved version of the assessment. Additional Comments:   Known H/O non-critical CAD   Now with worsening angina   Plan for cath   Explained the the risks and complications , alternative . Pt verbalizes for cath and understand. Further plan after the cath . Dr. Michel Lopez Cardiology Cardiology    Consult / H&P               Today's Date: 12/7/2018  Patient Name: [facility]  Date of admission: 12/6/2018  9:46 PM  Patient's age: 66 y.o., 1960  Admission Dx: Unstable angina ([address] [I20.0]    Reason for Consult:  Cardiac evaluation for unstable angina     Requesting Physician: Cheryle Ehrlich, MD    CHIEF COMPLAINT:  Chest pressure     History Obtained From:  Patient     HISTORY OF PRESENT ILLNESS:      The patient is a 66 y.o.  female who is admitted to the hospital for unstable angina and cardiac catheterization. PMHx of CAD, SVT, HTN, T2DM, MINH, PAD, HLD, MS, hypothyroidism, gout, and GERD. Patient had sudden onset epigastric pressure, left arm heaviness, feeling of dread, nausea, and chills. Episode was not brought on by exertion, she was sitting in her car when it occured. Episode lasted 30 minutes and was relived by morphine given in the ED. Patient denies any SOB or palpitations at the time. She has had a similar episode in June of this year and had a cardiac cath done revealing moderate to severe 2 vessel disease the LAD and the OM1 branch of the circumflex.  She was given nitroglycerin but has differently: Inhale into the lungs as needed 2 lpm @ nasal cannula, continuous, both portable and stationary systems. 5/17/12  Yes Oliver DOMINGUEZ Mallory, DO   Magnesium 250 MG TABS Take 1,000 mg by mouth every morning    Yes Historical Provider, MD         Allergies:  Bee venom; Erythromycin; and Janumet [sitagliptin-metformin hcl er]    Social History:   reports that she has never smoked. She has never used smokeless tobacco. She reports that she does not drink alcohol or use drugs. Family History: family history includes Colon Cancer in her paternal grandmother; Colon Polyps in an other family member; Diabetes in her maternal grandfather, maternal grandmother, and mother; Heart Disease in her father; High Cholesterol in her mother; Hypertension in her father and mother; Mult Sclerosis in her sister; Other in her father and paternal grandfather. REVIEW OF SYSTEMS:    · Constitutional: there has been no unanticipated weight loss. There's been No change in energy level, No change in activity level. · Eyes: No visual changes or diplopia. No scleral icterus. · ENT: No Headaches  · Cardiovascular: No current chest pain, palpitations, dyspnea, peripheral edema   · Respiratory: No previous pulmonary problems, No cough  · Gastrointestinal: No abdominal pain. No change in bowel or bladder habits. · Genitourinary: No dysuria, trouble voiding, or hematuria. · Musculoskeletal:  No gait disturbance, No weakness or joint complaints. · Integumentary: No rash or pruritis. · Neurological: No headache, diplopia, change in muscle strength, numbness or tingling. No change in gait, balance, coordination, mood, affect, memory, mentation, behavior. · Psychiatric: No anxiety, or depression. · Endocrine: No temperature intolerance. No excessive thirst, fluid intake, or urination. No tremor. · Hematologic/Lymphatic: No abnormal bruising or bleeding, blood clots or swollen lymph nodes.   · Allergic/Immunologic: No nasal congestion or hives. PHYSICAL EXAM:      /63   Pulse 64   Temp 98 °F (36.7 °C) (Oral)   Resp 16   Ht 5' 5\" (1.651 m)   Wt 192 lb (87.1 kg)   SpO2 98%   BMI 31.95 kg/m²    Constitutional and General Appearance: alert, cooperative, no distress and appears stated age  HEENT: PERRL, no cervical lymphadenopathy. No masses palpable. Normal oral mucosa  Respiratory:  · Normal excursion and expansion without use of accessory muscles  · Resp Auscultation: Good respiratory effort. No for increased work of breathing. On auscultation: clear to auscultation bilaterally  Cardiovascular:  · The apical impulse is not displaced  · Heart tones are crisp and normal. regular S1 and S2.  · Jugular venous pulsation Normal  · The carotid upstroke is normal in amplitude and contour without delay or bruit  · Peripheral pulses are symmetrical and full   Abdomen:   · No masses or tenderness  · Bowel sounds present  Extremities:  ·  No Cyanosis or Clubbing  ·  Lower extremity edema: No  ·  Skin: Warm and dry  Neurological:  · Alert and oriented. · Moves all extremities well  · No abnormalities of mood, affect, memory, mentation, or behavior are noted    DATA:    Diagnostics:      EKG:   (12/06/18)  Normal sinus rhythm  Nonspecific ST and T wave abnormality  Abnormal ECG  When compared with ECG of 06-DEC-2018 14:14,  No significant change was found    ECHO:   9/11/15: EF 55%, no valvular abnormalities. Cardiac Cath:   6/28/18  Moderate to severe two vessel disease involving the LAD and OM1 branch of   the circumflex coronary artery.   Normal left ventricular end diastolic pressure.  (LVEDP)   Max medical management--([facility])    Labs:     CBC:   Recent Labs      12/06/18   1432  12/07/18   0338   WBC  5.7  6.9   HGB  13.1  11.7*   HCT  41.8  37.6   PLT  234  230     BMP:   Recent Labs      12/06/18   1432  12/07/18   0338   NA  140  139   K  4.1  4.2   CO2  28  29   BUN  14  14   CREATININE  0.87  0.99*   LABGLOM  >60  58*   GLUCOSE 235*  196*     PT/INR:   Recent Labs      12/06/18   1432   PROTIME  10.0   INR  1.0     APTT:  Recent Labs      12/06/18   1432  12/07/18   0338   APTT  31.1  41.6*     FASTING LIPID PANEL:  Lab Results   Component Value Date    HDL 80 12/07/2018    LDLCALC 79 02/18/2013    TRIG 177 12/07/2018     LIVER PROFILE:  Recent Labs      12/05/18   0204  12/07/18   0338   AST  16  12   ALT  10  8   LABALBU  3.9  3.4*       IMPRESSION:    1. Unstable angina   2. History of CAD   3. History of HTN  4. History of SVTs  5. History of T2DM  6. History of Pulmonary HTN and MINH  7. History of HLD and PAD  8. History of MS     Patient Active Problem List   Diagnosis    MINH (obstructive sleep apnea)    Diabetes type 2, uncontrolled ([facility]    Dyslipidemia    Hypothyroid    HTN (hypertension)    Obesity (BMI 30-39. 9)    Dermatitis herpetiformis    Multiple sclerosis, relapsing-remitting (HCC)    SVT (supraventricular tachycardia) (HCC)    PAD (peripheral artery disease) (HCC)    Spasticity    Multiple sclerosis (HCC)    Gout    Memory loss    Cerebrovascular disease    MS (multiple sclerosis) (HCC)    Gait difficulty    Muscle spasm of left shoulder    ACS (acute coronary syndrome) (HCC)    Abnormal cardiovascular stress test       RECOMMENDATIONS:  Please see above     Edward Fam  MS4

## 2018-12-07 NOTE — H&P
intolerant of cpap    PAD (peripheral artery disease) ([facility])     SVT (supraventricular tachycardia) ([facility])     Type II or unspecified type diabetes mellitus without mention of complication, not stated as uncontrolled         Past Surgical History:     Past Surgical History:   Procedure Laterality Date    CARDIAC CATHETERIZATION Left 2018    right radial/[facility]/Dr Pollard    CARPAL TUNNEL RELEASE      left    CERVICAL DISC SURGERY       SECTION          DILATION AND CURETTAGE OF UTERUS      HYSTERECTOMY  2006    cervical dysplasia        Medications Prior to Admission:     Prior to Admission medications    Medication Sig Start Date End Date Taking? Authorizing Provider   furosemide (LASIX) 20 MG tablet Take 20 mg by mouth daily   Yes Historical Provider, MD   nitroGLYCERIN (NITROSTAT) 0.4 MG SL tablet up to max of 3 total doses. If no relief after 1 dose, call 911. 18  Yes Kristina Christopher MD   levothyroxine (SYNTHROID) 200 MCG tablet Take 1 tablet by mouth Daily 4/10/18  Yes Marek Felix MD   baclofen (LIORESAL) 10 MG tablet Take 2 tab four times daily. Patient taking differently: Take 10 mg by mouth 4 times daily as needed  11/10/14  Yes Amanda Linares MD   metformin (GLUCOPHAGE) 1000 MG tablet Take 1 tablet by mouth daily (with breakfast). Patient taking differently: Take 500 mg by mouth 2 times daily (with meals)  3/20/13  Yes MARTÍN Duff - CNP   aspirin 325 MG EC tablet Take 325 mg by mouth daily    Yes Historical Provider, MD   OXYGEN 2 lpm @ nasal cannula, continuous, both portable and stationary systems. Patient taking differently: Inhale into the lungs as needed 2 lpm @ nasal cannula, continuous, both portable and stationary systems.  12  Yes Oliver Tejada,    Magnesium 250 MG TABS Take 1,000 mg by mouth every morning    Yes Historical Provider, MD        Allergies:     Bee venom; Erythromycin; and Janumet [sitagliptin-metformin hcl er]    Social History:     Tobacco:    reports that she has never smoked. She has never used smokeless tobacco.  Alcohol:      reports that she does not drink alcohol. Drug Use:  reports that she does not use drugs. Family History:     Family History   Problem Relation Age of Onset    Diabetes Mother     High Cholesterol Mother     Hypertension Mother     Heart Disease Father     Hypertension Father     Other Father         abdominal aneurysm    Mult Sclerosis Sister     Diabetes Maternal Grandmother     Diabetes Maternal Grandfather     Colon Cancer Paternal Grandmother     Other Paternal Grandfather         brain aneurysm    Colon Polyps Other         Aunts with colon polyps       Review of Systems:     Positive and Negative as described in HPI. Review of Systems   Constitutional: Negative for activity change, appetite change, chills, fatigue, fever and unexpected weight change. HENT: Negative for congestion, mouth sores, postnasal drip, sinus pressure, sore throat and voice change. Eyes: Negative for visual disturbance. Respiratory: Negative for cough, shortness of breath and wheezing. Cardiovascular: Negative for chest pain and palpitations. Gastrointestinal: Negative for abdominal pain, blood in stool, constipation, diarrhea, nausea and vomiting. Endocrine: Negative for polyuria. Genitourinary: Negative for difficulty urinating, dysuria, frequency and urgency. Musculoskeletal: Negative for arthralgias, joint swelling and myalgias. Neurological: Negative for dizziness, tremors, speech difficulty, light-headedness and headaches.        Physical Exam:   /63   Pulse 64   Temp 98 °F (36.7 °C) (Oral)   Resp 16   Ht 5' 5\" (1.651 m)   Wt 192 lb (87.1 kg)   SpO2 98%   BMI 31.95 kg/m²   Temp (24hrs), Av.1 °F (36.7 °C), Min:98 °F (36.7 °C), Max:98.3 °F (36.8 °C)    Recent Labs      18   0835  18   1112   POCGLU  208*  206*       Intake/Output Summary (Last Bun/Cre Ratio NOT REPORTED 9 - 20    Calcium 9.1 8.6 - 10.4 mg/dL    Sodium 139 135 - 144 mmol/L    Potassium 4.2 3.7 - 5.3 mmol/L    Chloride 101 98 - 107 mmol/L    CO2 29 20 - 31 mmol/L    Anion Gap 9 9 - 17 mmol/L    Alkaline Phosphatase 77 35 - 104 U/L    ALT 8 5 - 33 U/L    AST 12 <32 U/L    Total Bilirubin 0.17 (L) 0.3 - 1.2 mg/dL    Total Protein 6.5 6.4 - 8.3 g/dL    Alb 3.4 (L) 3.5 - 5.2 g/dL    Albumin/Globulin Ratio 1.1 1.0 - 2.5    GFR Non- 58 (L) >60 mL/min    GFR African American >60 >60 mL/min    GFR Comment          GFR Staging NOT REPORTED    Magnesium    Collection Time: 12/07/18  3:38 AM   Result Value Ref Range    Magnesium 1.7 1.6 - 2.6 mg/dL   Brain natriuretic peptide    Collection Time: 12/07/18  3:38 AM   Result Value Ref Range    Pro- <300 pg/mL    BNP Interpretation         Lipid panel - fasting    Collection Time: 12/07/18  3:38 AM   Result Value Ref Range    Cholesterol 202 (H) <200 mg/dL    HDL 80 >40 mg/dL    LDL Cholesterol 87 0 - 130 mg/dL    Chol/HDL Ratio 2.5 <5    Triglycerides 177 (H) <150 mg/dL    VLDL NOT REPORTED 1 - 30 mg/dL   CBC    Collection Time: 12/07/18  3:38 AM   Result Value Ref Range    WBC 6.9 3.5 - 11.3 k/uL    RBC 4.11 3.95 - 5.11 m/uL    Hemoglobin 11.7 (L) 11.9 - 15.1 g/dL    Hematocrit 37.6 36.3 - 47.1 %    MCV 91.5 82.6 - 102.9 fL    MCH 28.5 25.2 - 33.5 pg    MCHC 31.1 28.4 - 34.8 g/dL    RDW 13.8 11.8 - 14.4 %    Platelets 499 918 - 836 k/uL    MPV 12.5 8.1 - 13.5 fL    NRBC Automated 0.0 0.0 per 100 WBC   APTT    Collection Time: 12/07/18  3:38 AM   Result Value Ref Range    PTT 41.6 (H) 20.5 - 30.5 sec   Troponin    Collection Time: 12/07/18  3:38 AM   Result Value Ref Range    Troponin T <0.03 <0.03 ng/mL    Troponin Interp         T4, Free    Collection Time: 12/07/18  3:38 AM   Result Value Ref Range    Thyroxine, Free 1.09 0.93 - 1.70 ng/dL   POC Glucose Fingerstick    Collection Time: 12/07/18  8:35 AM   Result Value Ref

## 2018-12-08 VITALS
TEMPERATURE: 98.2 F | BODY MASS INDEX: 31.99 KG/M2 | SYSTOLIC BLOOD PRESSURE: 129 MMHG | HEART RATE: 71 BPM | RESPIRATION RATE: 18 BRPM | OXYGEN SATURATION: 98 % | DIASTOLIC BLOOD PRESSURE: 68 MMHG | HEIGHT: 65 IN | WEIGHT: 192 LBS

## 2018-12-08 LAB
GLUCOSE BLD-MCNC: 161 MG/DL (ref 65–105)
GLUCOSE BLD-MCNC: 196 MG/DL (ref 65–105)
GLUCOSE BLD-MCNC: 203 MG/DL (ref 65–105)
GLUCOSE BLD-MCNC: 212 MG/DL (ref 65–105)

## 2018-12-08 PROCEDURE — 82947 ASSAY GLUCOSE BLOOD QUANT: CPT

## 2018-12-08 PROCEDURE — 6370000000 HC RX 637 (ALT 250 FOR IP): Performed by: CLINICAL NURSE SPECIALIST

## 2018-12-08 PROCEDURE — 6370000000 HC RX 637 (ALT 250 FOR IP): Performed by: NURSE PRACTITIONER

## 2018-12-08 PROCEDURE — 6370000000 HC RX 637 (ALT 250 FOR IP): Performed by: INTERNAL MEDICINE

## 2018-12-08 PROCEDURE — 99232 SBSQ HOSP IP/OBS MODERATE 35: CPT | Performed by: FAMILY MEDICINE

## 2018-12-08 RX ORDER — CLOPIDOGREL BISULFATE 75 MG/1
75 TABLET ORAL DAILY
Qty: 30 TABLET | Refills: 3 | Status: SHIPPED | OUTPATIENT
Start: 2018-12-09

## 2018-12-08 RX ORDER — ATORVASTATIN CALCIUM 40 MG/1
40 TABLET, FILM COATED ORAL NIGHTLY
Qty: 30 TABLET | Refills: 3 | Status: SHIPPED | OUTPATIENT
Start: 2018-12-08 | End: 2022-04-21

## 2018-12-08 RX ORDER — ASPIRIN 81 MG/1
81 TABLET ORAL DAILY
Qty: 30 TABLET | Refills: 3 | Status: SHIPPED | OUTPATIENT
Start: 2018-12-09 | End: 2022-04-21

## 2018-12-08 RX ADMIN — BACLOFEN 20 MG: 10 TABLET ORAL at 06:38

## 2018-12-08 RX ADMIN — INSULIN LISPRO 1 UNITS: 100 INJECTION, SOLUTION INTRAVENOUS; SUBCUTANEOUS at 09:06

## 2018-12-08 RX ADMIN — CLOPIDOGREL 75 MG: 75 TABLET, FILM COATED ORAL at 08:28

## 2018-12-08 RX ADMIN — FUROSEMIDE 20 MG: 20 TABLET ORAL at 08:28

## 2018-12-08 RX ADMIN — ASPIRIN 81 MG: 81 TABLET ORAL at 08:28

## 2018-12-08 RX ADMIN — LEVOTHYROXINE SODIUM 200 MCG: 100 TABLET ORAL at 08:28

## 2018-12-08 RX ADMIN — Medication 1000 MG: at 08:28

## 2018-12-08 RX ADMIN — METOPROLOL TARTRATE 25 MG: 25 TABLET ORAL at 08:27

## 2018-12-08 RX ADMIN — FAMOTIDINE 20 MG: 20 TABLET, FILM COATED ORAL at 08:28

## 2018-12-08 RX ADMIN — BACLOFEN 20 MG: 10 TABLET ORAL at 00:10

## 2018-12-08 ASSESSMENT — ENCOUNTER SYMPTOMS
SHORTNESS OF BREATH: 0
SINUS PRESSURE: 0
NAUSEA: 0
COUGH: 0
VOMITING: 0
BLOOD IN STOOL: 0
SORE THROAT: 0
ABDOMINAL PAIN: 0
DIARRHEA: 0
CONSTIPATION: 0
VOICE CHANGE: 0
WHEEZING: 0

## 2018-12-08 ASSESSMENT — PAIN SCALES - GENERAL: PAINLEVEL_OUTOF10: 0

## 2018-12-08 NOTE — PROGRESS NOTES
Attempted to remove 3 ml air from TR band, bleeding noted, 2 ml replaced will retry 30 min
Received a phone call from pharmacy questioning the high dose heparin gtt order upon transfer from [facility]. Paged Dr. Santino Barkley with cardiology to clarify order.      Electronically signed by Tim Laughlin RN on 12/6/2018 at 11:10 PM
pressure. (LVEDP)   Max medical management--(Bru)    12/7/18  Left main: NL  LAD: Mid 75% stenosis with haziness. Required PTCA -TR  LCX: NL  OM1: proximal; 80%, moderate size vessel. Required PTCA -TR  RCA: NL  The LV gram was not done     Conclusions:  1. Two vessel CAD  2. Successful PTCA -TR LAD and OM1       Patient's Active Problem List   Principal Problem:    Unstable angina ([facility])  Active Problems:    MINH (obstructive sleep apnea)    Diabetes type 2, uncontrolled ([facility])    Obesity (BMI 30-39. 9)    Multiple sclerosis, relapsing-remitting ([facility])    SVT (supraventricular tachycardia) ([facility])    MS (multiple sclerosis) ([facility] [address]  Resolved Problems:    * No resolved hospital problems. *        Assessment / Acute Cardiac Problems:   1 Unstable angina s/p PTCA-TR LAD and OM1  2 Hyperlipidemia. 3 Diabetes, type II.   4 Hypothyroidism. 5 Dermatitis herpetiformis. 6 Pul HTN  7 Possibility of outlet syndrome in the left arm. 8 Heart catheterization in 2008 suggesting normal nonobstructive coronary artery disease with normal pulmonary artery pressure and wedge pressure. 9 Supraventricular arrhythmia in the past.   10 Obstructive sleep apnea. 11 Multiple sclerosis. 12 Morbid obesity. Plan of Treatment:   1. Continue Aspirin, Plavix and Lipitor   2. Continue Metoprolol 25 mg bid and Lasix 20 mg daily  3. Clear for discharge from cardiology standpoint        Caryle Poster, MD  Fellow, [address]      Attending Physician Statement  I have discussed the care of the patient, including pertinent history and exam findings, with the resident. I have seen and examined the patient and the key elements of all parts of the encounter have been performed by me. I agree with the assessment, plan and orders as documented by the resident.   Follow up with Dr. Manpreet Purcell in 2-3 wks in [place] Escamilla MD

## 2018-12-08 NOTE — PLAN OF CARE
Problem: Falls - Risk of:  Goal: Will remain free from falls  Will remain free from falls   Outcome: Ongoing  Patient free from falls. Bed in lowest locked position. 2/4 side rails up. Call light and personal items within reach. Will continue to monitor.    Goal: Absence of physical injury  Absence of physical injury   Outcome: Ongoing      Problem: SAFETY  Goal: Free from accidental physical injury  Outcome: Ongoing      Problem: DAILY CARE  Goal: Daily care needs are met  Outcome: Ongoing      Problem: PAIN  Goal: Patient's pain/discomfort is manageable  Outcome: Ongoing      Problem: SKIN INTEGRITY  Goal: Skin integrity is maintained or improved  Outcome: Ongoing

## 2018-12-08 NOTE — CARE COORDINATION
Met with patient to discuss transitional planning.   Plan is home independently    Discharge [facility] Case Management Department  Written by: Maryuri Flower RN    Patient Name: Candance Citizen  Attending Provider: Brigido Mckenzie MD  Admit Date: 2018  9:46 PM  MRN: [id number]  Account: [de-identified]                     : 1960  Discharge Date:  2018        Disposition: home    Maryuri Flower RN

## 2019-04-02 ENCOUNTER — HOSPITAL ENCOUNTER (OUTPATIENT)
Age: 59
Discharge: HOME | End: 2019-04-02
Payer: MEDICARE

## 2019-04-02 PROCEDURE — 93005 ELECTROCARDIOGRAM TRACING: CPT

## 2019-04-03 LAB
EKG ATRIAL RATE: 68 BPM
EKG P AXIS: 51 DEGREES
EKG P-R INTERVAL: 132 MS
EKG Q-T INTERVAL: 388 MS
EKG QRS DURATION: 88 MS
EKG QTC CALCULATION (BAZETT): 412 MS
EKG R AXIS: 15 DEGREES
EKG T AXIS: 33 DEGREES
EKG VENTRICULAR RATE: 68 BPM

## 2019-05-29 ENCOUNTER — HOSPITAL ENCOUNTER (EMERGENCY)
Age: 59
Discharge: HOME | End: 2019-05-29
Attending: EMERGENCY MEDICINE
Payer: MEDICARE

## 2019-05-29 VITALS
SYSTOLIC BLOOD PRESSURE: 137 MMHG | DIASTOLIC BLOOD PRESSURE: 75 MMHG | RESPIRATION RATE: 18 BRPM | OXYGEN SATURATION: 98 % | HEART RATE: 87 BPM | TEMPERATURE: 98.3 F

## 2019-05-29 DIAGNOSIS — N30.00 ACUTE CYSTITIS WITHOUT HEMATURIA: Primary | ICD-10-CM

## 2019-05-29 DIAGNOSIS — I10 ESSENTIAL HYPERTENSION: ICD-10-CM

## 2019-05-29 LAB
-: ABNORMAL
AMORPHOUS: ABNORMAL
BACTERIA: ABNORMAL
BILIRUBIN URINE: NEGATIVE
CASTS UA: ABNORMAL /LPF
COLOR: YELLOW
COMMENT UA: ABNORMAL
CRYSTALS, UA: ABNORMAL /HPF
EPITHELIAL CELLS UA: ABNORMAL /HPF (ref 0–25)
GLUCOSE URINE: NEGATIVE
KETONES, URINE: NEGATIVE
LEUKOCYTE ESTERASE, URINE: ABNORMAL
MUCUS: ABNORMAL
NITRITE, URINE: NEGATIVE
OTHER OBSERVATIONS UA: ABNORMAL
PH UA: 6 (ref 5–9)
PROTEIN UA: NEGATIVE
RBC UA: ABNORMAL /HPF (ref 0–2)
RENAL EPITHELIAL, UA: ABNORMAL /HPF
SPECIFIC GRAVITY UA: <1.005 (ref 1.01–1.02)
TRICHOMONAS: ABNORMAL
TURBIDITY: CLEAR
URINE HGB: NEGATIVE
UROBILINOGEN, URINE: NORMAL
WBC UA: ABNORMAL /HPF (ref 0–5)
YEAST: ABNORMAL

## 2019-05-29 PROCEDURE — 99283 EMERGENCY DEPT VISIT LOW MDM: CPT

## 2019-05-29 PROCEDURE — 81001 URINALYSIS AUTO W/SCOPE: CPT

## 2019-05-29 PROCEDURE — 87086 URINE CULTURE/COLONY COUNT: CPT

## 2019-05-29 RX ORDER — PHENAZOPYRIDINE HYDROCHLORIDE 200 MG/1
200 TABLET, FILM COATED ORAL 3 TIMES DAILY PRN
Qty: 6 TABLET | Refills: 0 | Status: SHIPPED | OUTPATIENT
Start: 2019-05-29 | End: 2019-05-31

## 2019-05-29 RX ORDER — PHENAZOPYRIDINE HYDROCHLORIDE 100 MG/1
200 TABLET, FILM COATED ORAL ONCE
Status: DISCONTINUED | OUTPATIENT
Start: 2019-05-29 | End: 2019-05-29 | Stop reason: HOSPADM

## 2019-05-29 RX ORDER — SULFAMETHOXAZOLE AND TRIMETHOPRIM 800; 160 MG/1; MG/1
1 TABLET ORAL 2 TIMES DAILY
Qty: 6 TABLET | Refills: 0 | Status: SHIPPED | OUTPATIENT
Start: 2019-05-29 | End: 2019-06-01

## 2019-05-29 RX ORDER — SULFAMETHOXAZOLE AND TRIMETHOPRIM 800; 160 MG/1; MG/1
1 TABLET ORAL 2 TIMES DAILY
Qty: 6 TABLET | Refills: 0 | Status: SHIPPED | OUTPATIENT
Start: 2019-05-29 | End: 2019-05-29 | Stop reason: SDUPTHER

## 2019-05-29 ASSESSMENT — ENCOUNTER SYMPTOMS
COLOR CHANGE: 0
RECTAL PAIN: 0
VOICE CHANGE: 0
BACK PAIN: 1
WHEEZING: 0
NAUSEA: 0
ABDOMINAL PAIN: 0
EYE DISCHARGE: 0
STRIDOR: 0
CHEST TIGHTNESS: 0
RHINORRHEA: 0
EYE ITCHING: 0
EYE PAIN: 0
SINUS PAIN: 0
PHOTOPHOBIA: 0
SINUS PRESSURE: 0
SHORTNESS OF BREATH: 0
COUGH: 0
BLOOD IN STOOL: 0
TROUBLE SWALLOWING: 0
CONSTIPATION: 0
FACIAL SWELLING: 0
VOMITING: 0
SORE THROAT: 0
DIARRHEA: 0
ABDOMINAL DISTENTION: 0
EYE REDNESS: 0

## 2019-05-29 ASSESSMENT — PAIN SCALES - GENERAL: PAINLEVEL_OUTOF10: 7

## 2019-05-29 ASSESSMENT — PAIN DESCRIPTION - LOCATION: LOCATION: FLANK

## 2019-05-29 ASSESSMENT — PAIN DESCRIPTION - ORIENTATION: ORIENTATION: LEFT

## 2019-05-29 NOTE — ED PROVIDER NOTES
[facility] ED  eMERGENCY dEPARTMENT eNCOUnter      Pt Name: Rebel Nuñez  MRN: [id number]  [place] 1960  Date of evaluation: 5/29/2019  Provider: Nic Turcios MD    [address] [place] [address]     Chief Complaint   Patient presents with    Dysuria     awoke from sleep 20-30 minutes pta    Flank Pain     began last night before bed. Patient has hx of bladder infections/ UTI         HISTORY OF PRESENT ILLNESS   (Location/Symptom, Timing/Onset, Context/Setting,Quality, Duration, Modifying Factors, Severity)  Note limiting factors. Rebel Nuñez is a66 y.o. female who presents to the emergency department dysuria    HPI  Patient came into the emergency department complaining of a 30 minute duration of dysuria and urinary urgency. Patient woke up when her significant other came home and went to the bathroom shortly thereafter, having the discomfort. She states she may have had some dysuria for the past 3 days but due to her multiple sclerosis she does not notice these types of symptoms very often. Patient states she went to bed in the evening of 5/28/2019 with some left lower back/flank pain area. Patient denies any fever in the past day. Patient denies any hematuria, vaginal discharge, diarrhea, constipation, abdominal pain, or any skin rashes anywhere. Patient has not been evaluated or treated for her symptoms prior to coming into the emergency department. Patient has not taken anything to help with her symptoms. Patient states she has a history of bladder and kidney infections in the past.    Nursing Notes were reviewed. REVIEW OF SYSTEMS    (2-9 systems for level 4, 10 or more for level 5)     Review of Systems   Constitutional: Negative for activity change, chills, diaphoresis, fatigue, fever and unexpected weight change.    HENT: Negative for congestion, dental problem, drooling, ear discharge, ear pain, facial swelling, mouth sores, postnasal drip, rhinorrhea, sinus pressure, sinus pain, sore throat, trouble swallowing and voice change. Eyes: Negative for photophobia, pain, discharge, redness, itching and visual disturbance. Respiratory: Negative for cough, chest tightness, shortness of breath, wheezing and stridor. Cardiovascular: Negative for chest pain, palpitations and leg swelling. Gastrointestinal: Negative for abdominal distention, abdominal pain, blood in stool, constipation, diarrhea, nausea, rectal pain and vomiting. Endocrine: Negative for cold intolerance, polydipsia and polyuria. Genitourinary: Positive for dysuria, flank pain and urgency. Negative for decreased urine volume, difficulty urinating, frequency, genital sores and hematuria. Musculoskeletal: Positive for back pain. Negative for arthralgias, myalgias, neck pain and neck stiffness. Skin: Negative for color change, pallor, rash and wound. Allergic/Immunologic: Negative for environmental allergies, food allergies and immunocompromised state. Neurological: Negative for dizziness, tremors, seizures, speech difficulty, weakness, light-headedness, numbness and headaches. Hematological: Negative for adenopathy. Does not bruise/bleed easily. Psychiatric/Behavioral: Negative for agitation, behavioral problems, confusion, dysphoric mood, hallucinations, sleep disturbance and suicidal ideas. The patient is not nervous/anxious. Except as noted above the remainder of the review of systems was reviewed and negative.        PAST MEDICAL HISTORY     Past Medical History:   Diagnosis Date    Alveolar hypoventilation     Dermatitis herpetiformis     GERD (gastroesophageal reflux disease)     Gout     Hearing loss     Hyperlipidemia     Hypertension     Hypothyroidism     Morbid obesity ([facility] [address]     Multiple sclerosis (HCC)     Neuropathy     MINH (obstructive sleep apnea)     intolerant of cpap    PAD (peripheral artery disease) ([facility])     [facility]) ([facility])     Type II or unspecified type diabetes mellitus without mention of complication, not stated as uncontrolled          SURGICALHISTORY       Past Surgical History:   Procedure Laterality Date    CARDIAC CATHETERIZATION Left 2018    right radial/[facility]my/Dr Pollard    CARPAL TUNNEL RELEASE      left    CERVICAL DISC SURGERY       SECTION          DILATION AND CURETTAGE OF UTERUS      HYSTERECTOMY  2006    cervical dysplasia         CURRENT MEDICATIONS       Previous Medications    ASPIRIN 81 MG EC TABLET    Take 1 tablet by mouth daily    ATORVASTATIN (LIPITOR) 40 MG TABLET    Take 1 tablet by mouth nightly    BACLOFEN (LIORESAL) 10 MG TABLET    Take 2 tab four times daily. CLOPIDOGREL (PLAVIX) 75 MG TABLET    Take 1 tablet by mouth daily    FUROSEMIDE (LASIX) 20 MG TABLET    Take 20 mg by mouth daily    LEVOTHYROXINE (SYNTHROID) 200 MCG TABLET    Take 1 tablet by mouth Daily    MAGNESIUM 250 MG TABS    Take 1,000 mg by mouth every morning     METFORMIN (GLUCOPHAGE) 1000 MG TABLET    Take 1 tablet by mouth daily (with breakfast). METOPROLOL TARTRATE (LOPRESSOR) 25 MG TABLET    Take 1 tablet by mouth 2 times daily    NITROGLYCERIN (NITROSTAT) 0.4 MG SL TABLET    up to max of 3 total doses. If no relief after 1 dose, call 911. OXYGEN    2 lpm @ nasal cannula, continuous, both portable and stationary systems.     VITAMIN D-3 (CHOLECALCIFEROL) 5000 UNITS TABS    Take 5,000 Units by mouth 3 times daily       ALLERGIES     Bee venom; Erythromycin; and Janumet [sitagliptin-metformin hcl er]    FAMILY HISTORY       Family History   Problem Relation Age of Onset    Diabetes Mother     High Cholesterol Mother     Hypertension Mother     Heart Disease Father     Hypertension Father     Other Father         abdominal aneurysm    Mult Sclerosis Sister     Diabetes Maternal Grandmother     Diabetes Maternal Grandfather     Colon Cancer Paternal Grandmother     Other Paternal Grandfather brain aneurysm    Colon Polyps Other         Aunts with colon polyps          SOCIAL HISTORY       Social History     Socioeconomic History    Marital status:      Spouse name: None    Number of children: None    Years of education: None    Highest education level: None   Occupational History    Occupation:      Comment: not working due to medical problems   Social Needs    Financial resource strain: None    Food insecurity:     Worry: None     Inability: None    Transportation needs:     Medical: None     Non-medical: None   Tobacco Use    Smoking status: Never Smoker    Smokeless tobacco: Never Used   Substance and Sexual Activity    Alcohol use: No     Comment: occ    Drug use: No    Sexual activity: Yes     Partners: Male   Lifestyle    Physical activity:     Days per week: None     Minutes per session: None    Stress: None   Relationships    Social connections:     Talks on phone: None     Gets together: None     Attends Sikhism service: None     Active member of club or organization: None     Attends meetings of clubs or organizations: None     Relationship status: None    Intimate partner violence:     Fear of current or ex partner: None     Emotionally abused: None     Physically abused: None     Forced sexual activity: None   Other Topics Concern    None   Social History Narrative    None       SCREENINGS    [place] Coma Scale  Eye Opening: Spontaneous  Best Verbal Response: Oriented  Best Motor Response: Obeys commands  [place] Coma Scale Score: 15        PHYSICAL EXAM    (up to 7 for level 4, 8 or more for level 5)     Vitals:    05/29/19 0551   BP: 137/75   Pulse: 86   Resp: 20   Temp: 98.3 °F (36.8 °C)   TempSrc: Tympanic   SpO2: 98%       Physical Exam   Constitutional: She is oriented to person, place, and time. She appears well-developed and well-nourished. HENT:   Head: Normocephalic and atraumatic.    Right Ear: External ear normal.   Left Ear: External ear normal.   Nose: Nose normal.   Mouth/Throat: Oropharynx is clear and moist.   Eyes: Pupils are equal, round, and reactive to light. Conjunctivae and EOM are normal.   Neck: Normal range of motion. Neck supple. Cardiovascular: Normal rate, regular rhythm, normal heart sounds and intact distal pulses. Exam reveals no gallop and no friction rub. No murmur heard. Pulmonary/Chest: Effort normal and breath sounds normal. No stridor. No respiratory distress. She has no wheezes. She has no rales. Abdominal: Soft. Bowel sounds are normal. She exhibits no distension and no mass. There is no tenderness. There is no rebound, no guarding and no CVA tenderness. Musculoskeletal: Normal range of motion. Lymphadenopathy:     She has no cervical adenopathy. Neurological: She is alert and oriented to person, place, and time. She has normal strength. No cranial nerve deficit or sensory deficit. GCS eye subscore is 4. GCS verbal subscore is 5. GCS motor subscore is 6. Skin: Skin is warm and dry. Capillary refill takes less than 2 seconds. No rash noted. No pallor. Psychiatric: She has a normal mood and affect. Her behavior is normal.   Nursing note and vitals reviewed.       DIAGNOSTIC RESULTS     RADIOLOGY:   Non-plain film images such as CT, Ultrasound and MRI are read by the radiologist. Plain radiographic images are visualized and preliminarily interpreted by the emergency physician with the below findings:        Interpretation per the Radiologist below, ifavailable at the time of this note:    No orders to display         ED BEDSIDE ULTRASOUND:   Performed by ED Physician - none    LABS:  Labs Reviewed   URINE RT REFLEX TO CULTURE - Abnormal; Notable for the following components:       Result Value    Specific Gravity, UA <1.005 (*)     Leukocyte Esterase, Urine SMALL (*)     All other components within normal limits   MICROSCOPIC URINALYSIS - Abnormal; Notable for the following components:    Bacteria, UA TRACE (*)     All other components within normal limits   URINE CULTURE       All other labs were within normal range ornot returned as of this dictation. EMERGENCY DEPARTMENT COURSE and DIFFERENTIAL DIAGNOSIS/MDM:   Vitals:    Vitals:    05/29/19 0551   BP: 137/75   Pulse: 86   Resp: 20   Temp: 98.3 °F (36.8 °C)   TempSrc: Tympanic   SpO2: 98%           MDM  Urine culture result from 1/18/2018:  E. coli grew out and was pan-sensitive    Patient's history, examination and urinalysis suggestive of acute cystitis is developing. Patient be sent home with a three-day course of Bactrim twice daily with [place] 5 Pyridium to help with symptomatic relief for her dysuria. I reviewed patient in detail what signs and symptoms to return back to emergency including any worsening pain, new fever, any flank pain, no abdominal pain or any other concerning signs or symptoms that were not present department visit. Patient is to follow-up with her physician in 2 days to follow-up urine culture result and I reviewed with her that we may need to change therapy according to urine culture results. At the time of discharge, patient left in good condition and with no signs of any acute abdominal issue that required further inpatient admission or immediate surgical or urologic or gynecologic consultation. CRITICAL CARE TIME   None    CONSULTS:  None    PROCEDURES:  Unless otherwise noted below, none     Procedures    FINAL IMPRESSION      1. Acute cystitis without hematuria    2.  Essential hypertension          DISPOSITION/PLAN   DISPOSITION    Discharge Home in Good Condition    PATIENT REFERRED TO:  Xuan Keller, [address]    Schedule an appointment as soon as possible for a visit in 2 days      [facility] ED  [address] [place] [address]  [phone]    As needed, If symptoms worsen      DISCHARGE MEDICATIONS:  New Prescriptions    PHENAZOPYRIDINE (PYRIDIUM) 200 MG

## 2019-05-30 LAB
CULTURE: NORMAL
Lab: NORMAL
SPECIMEN DESCRIPTION: NORMAL

## 2019-07-09 ENCOUNTER — HOSPITAL ENCOUNTER (EMERGENCY)
Age: 59
Discharge: HOME | End: 2019-07-10
Attending: INTERNAL MEDICINE
Payer: MEDICARE

## 2019-07-09 DIAGNOSIS — M62.838 SPASM OF MUSCLE: Primary | ICD-10-CM

## 2019-07-09 PROCEDURE — 96372 THER/PROPH/DIAG INJ SC/IM: CPT

## 2019-07-09 PROCEDURE — 6360000002 HC RX W HCPCS: Performed by: INTERNAL MEDICINE

## 2019-07-09 PROCEDURE — 99283 EMERGENCY DEPT VISIT LOW MDM: CPT

## 2019-07-09 RX ORDER — ORPHENADRINE CITRATE 30 MG/ML
60 INJECTION INTRAMUSCULAR; INTRAVENOUS ONCE
Status: COMPLETED | OUTPATIENT
Start: 2019-07-09 | End: 2019-07-09

## 2019-07-09 RX ADMIN — ORPHENADRINE CITRATE 60 MG: 30 INJECTION INTRAMUSCULAR; INTRAVENOUS at 23:12

## 2019-07-09 ASSESSMENT — PAIN DESCRIPTION - LOCATION
LOCATION: SHOULDER
LOCATION: SHOULDER

## 2019-07-09 ASSESSMENT — PAIN DESCRIPTION - ORIENTATION
ORIENTATION: LEFT
ORIENTATION: LEFT

## 2019-07-09 ASSESSMENT — PAIN SCALES - GENERAL
PAINLEVEL_OUTOF10: 9
PAINLEVEL_OUTOF10: 4
PAINLEVEL_OUTOF10: 7

## 2019-07-09 ASSESSMENT — PAIN DESCRIPTION - PAIN TYPE: TYPE: ACUTE PAIN

## 2019-07-09 ASSESSMENT — PAIN DESCRIPTION - PROGRESSION: CLINICAL_PROGRESSION: GRADUALLY IMPROVING

## 2019-07-10 VITALS
RESPIRATION RATE: 16 BRPM | SYSTOLIC BLOOD PRESSURE: 132 MMHG | TEMPERATURE: 97 F | OXYGEN SATURATION: 98 % | HEART RATE: 74 BPM | DIASTOLIC BLOOD PRESSURE: 74 MMHG

## 2019-07-10 NOTE — ED PROVIDER NOTES
[facility] ED  EMERGENCY DEPARTMENT ENCOUNTER      Pt Name: Diane Smart  MRN: [id number]  [place] 1960  Date of evaluation: 2019  Provider: Charles Vega MD    CHIEF COMPLAINT       Chief Complaint   Patient presents with    Shoulder Pain     has MS and states she is having a muscle spasm in left shoulder         HISTORY OF PRESENT ILLNESS    Diane Smart is a 64 y.o. female who presents to the emergency department with spasms in her left axilla and left shoulder region. She has a history of this in the past and has used her maximum dose of muscle relaxant. It is from her multiple sclerosis. No chest pain or shortness of breath. The pain is affected by moving her left arm and has a pulling sensation. No trauma or overexertion or strain to the left upper extremity known. Nursing Notes were reviewed. REVIEW OF SYSTEMS       Review of Systems    As above.       PAST MEDICAL HISTORY     Past Medical History:   Diagnosis Date    Alveolar hypoventilation     Dermatitis herpetiformis     GERD (gastroesophageal reflux disease)     Gout     Hearing loss     Hyperlipidemia     Hypertension     Hypothyroidism     Morbid obesity ([address]     Multiple sclerosis (HCC)     Neuropathy     MINH (obstructive sleep apnea)     intolerant of cpap    PAD (peripheral artery disease) (HCC)     SVT (supraventricular tachycardia) (HCC)     Type II or unspecified type diabetes mellitus without mention of complication, not stated as uncontrolled          SURGICAL HISTORY       Past Surgical History:   Procedure Laterality Date    CARDIAC CATHETERIZATION Left 2018    right radial/[facility]/Dr Pollard    CARPAL TUNNEL RELEASE      left    CERVICAL DISC SURGERY       SECTION          DILATION AND CURETTAGE OF UTERUS      HYSTERECTOMY  2006    cervical dysplasia         CURRENT MEDICATIONS       Discharge Medication List as of 2019 11:52 PM      CONTINUE these name: None    Number of children: None    Years of education: None    Highest education level: None   Occupational History    Occupation:      Comment: not working due to medical problems   Social Needs    Financial resource strain: None    Food insecurity:     Worry: None     Inability: None    Transportation needs:     Medical: None     Non-medical: None   Tobacco Use    Smoking status: Never Smoker    Smokeless tobacco: Never Used   Substance and Sexual Activity    Alcohol use: No     Comment: occ    Drug use: No    Sexual activity: Yes     Partners: Male   Lifestyle    Physical activity:     Days per week: None     Minutes per session: None    Stress: None   Relationships    Social connections:     Talks on phone: None     Gets together: None     Attends Moravian service: None     Active member of club or organization: None     Attends meetings of clubs or organizations: None     Relationship status: None    Intimate partner violence:     Fear of current or ex partner: None     Emotionally abused: None     Physically abused: None     Forced sexual activity: None   Other Topics Concern    None   Social History Narrative    None       SCREENINGS    [place] Coma Scale  Eye Opening: Spontaneous  Best Verbal Response: Oriented  Best Motor Response: Obeys commands  [place] Coma Scale Score: 15          PHYSICAL EXAM       ED Triage Vitals [07/09/19 2231]   BP Temp Temp Source Pulse Resp SpO2 Height Weight   (!) 150/82 97 °F (36.1 °C) Tympanic 82 16 97 % -- --       Physical Exam   Constitutional: She is oriented to person, place, and time. She appears well-developed and well-nourished. No distress. HENT:   Head: Normocephalic and atraumatic. Eyes: EOM are normal.   Cardiovascular: Normal rate, regular rhythm and normal heart sounds. No murmur heard. Pulmonary/Chest: Effort normal and breath sounds normal. No stridor. No respiratory distress. She has no wheezes. She has no rales.

## 2019-11-25 ENCOUNTER — APPOINTMENT (OUTPATIENT)
Dept: GENERAL RADIOLOGY | Age: 59
End: 2019-11-25
Payer: MEDICARE

## 2019-11-25 ENCOUNTER — APPOINTMENT (OUTPATIENT)
Dept: VASCULAR LAB | Age: 59
End: 2019-11-25
Payer: MEDICARE

## 2019-11-25 ENCOUNTER — HOSPITAL ENCOUNTER (EMERGENCY)
Age: 59
Discharge: HOME | End: 2019-11-25
Attending: EMERGENCY MEDICINE
Payer: MEDICARE

## 2019-11-25 VITALS
WEIGHT: 183 LBS | HEART RATE: 91 BPM | OXYGEN SATURATION: 100 % | TEMPERATURE: 96.9 F | BODY MASS INDEX: 30.45 KG/M2 | SYSTOLIC BLOOD PRESSURE: 159 MMHG | RESPIRATION RATE: 16 BRPM | DIASTOLIC BLOOD PRESSURE: 77 MMHG

## 2019-11-25 DIAGNOSIS — M25.561 ACUTE PAIN OF RIGHT KNEE: Primary | ICD-10-CM

## 2019-11-25 PROCEDURE — 73562 X-RAY EXAM OF KNEE 3: CPT

## 2019-11-25 PROCEDURE — 93971 EXTREMITY STUDY: CPT

## 2019-11-25 PROCEDURE — 99284 EMERGENCY DEPT VISIT MOD MDM: CPT

## 2019-11-25 ASSESSMENT — ENCOUNTER SYMPTOMS
CONSTIPATION: 0
DIARRHEA: 0
EYE PAIN: 0
CHEST TIGHTNESS: 0
PHOTOPHOBIA: 0
VOICE CHANGE: 0
FACIAL SWELLING: 0
COLOR CHANGE: 0
SHORTNESS OF BREATH: 0
EYE ITCHING: 0
SORE THROAT: 0
RHINORRHEA: 0
STRIDOR: 0
APNEA: 0
ABDOMINAL DISTENTION: 0
EYE DISCHARGE: 0
TROUBLE SWALLOWING: 0
BACK PAIN: 0
COUGH: 0
ABDOMINAL PAIN: 0
WHEEZING: 0
SINUS PAIN: 0
VOMITING: 0
EYE REDNESS: 0
BLOOD IN STOOL: 0
SINUS PRESSURE: 0
CHOKING: 0
NAUSEA: 0

## 2019-11-25 ASSESSMENT — PAIN DESCRIPTION - LOCATION: LOCATION: LEG

## 2019-11-25 ASSESSMENT — PAIN DESCRIPTION - ORIENTATION: ORIENTATION: LOWER

## 2019-11-25 ASSESSMENT — PAIN DESCRIPTION - PAIN TYPE: TYPE: ACUTE PAIN

## 2019-11-25 ASSESSMENT — PAIN DESCRIPTION - DESCRIPTORS: DESCRIPTORS: THROBBING;TENDER

## 2019-11-25 ASSESSMENT — PAIN SCALES - GENERAL: PAINLEVEL_OUTOF10: 7

## 2021-03-20 ENCOUNTER — APPOINTMENT (OUTPATIENT)
Dept: CT IMAGING | Age: 61
End: 2021-03-20
Payer: MEDICARE

## 2021-03-20 ENCOUNTER — HOSPITAL ENCOUNTER (OUTPATIENT)
Age: 61
Setting detail: OBSERVATION
Discharge: HOME | End: 2021-03-21
Attending: FAMILY MEDICINE | Admitting: FAMILY MEDICINE
Payer: MEDICARE

## 2021-03-20 ENCOUNTER — APPOINTMENT (OUTPATIENT)
Dept: GENERAL RADIOLOGY | Age: 61
End: 2021-03-20
Payer: MEDICARE

## 2021-03-20 DIAGNOSIS — H81.10 BENIGN PAROXYSMAL POSITIONAL VERTIGO, UNSPECIFIED LATERALITY: Primary | ICD-10-CM

## 2021-03-20 DIAGNOSIS — R11.2 NAUSEA AND VOMITING, INTRACTABILITY OF VOMITING NOT SPECIFIED, UNSPECIFIED VOMITING TYPE: ICD-10-CM

## 2021-03-20 PROBLEM — E11.9 TYPE 2 DIABETES MELLITUS WITHOUT COMPLICATION, WITHOUT LONG-TERM CURRENT USE OF INSULIN (HCC): Status: ACTIVE | Noted: 2021-03-20

## 2021-03-20 PROBLEM — E86.0 DEHYDRATION: Status: ACTIVE | Noted: 2021-03-20

## 2021-03-20 PROBLEM — I25.10 CORONARY ARTERY DISEASE INVOLVING NATIVE CORONARY ARTERY OF NATIVE HEART WITHOUT ANGINA PECTORIS: Status: ACTIVE | Noted: 2021-03-20

## 2021-03-20 LAB
ABSOLUTE EOS #: 0.03 K/UL (ref 0–0.44)
ABSOLUTE IMMATURE GRANULOCYTE: 0.03 K/UL (ref 0–0.3)
ABSOLUTE LYMPH #: 0.72 K/UL (ref 1.1–3.7)
ABSOLUTE MONO #: 0.33 K/UL (ref 0.1–1.2)
ALBUMIN SERPL-MCNC: 4.5 G/DL (ref 3.5–5.2)
ALBUMIN/GLOBULIN RATIO: 1.2 (ref 1–2.5)
ALP BLD-CCNC: 99 U/L (ref 35–104)
ALT SERPL-CCNC: 12 U/L (ref 5–33)
ANION GAP SERPL CALCULATED.3IONS-SCNC: 12 MMOL/L (ref 9–17)
AST SERPL-CCNC: 13 U/L
BASOPHILS # BLD: 1 % (ref 0–2)
BASOPHILS ABSOLUTE: 0.05 K/UL (ref 0–0.2)
BILIRUB SERPL-MCNC: 0.27 MG/DL (ref 0.3–1.2)
BNP INTERPRETATION: NORMAL
BUN BLDV-MCNC: 30 MG/DL (ref 6–20)
BUN/CREAT BLD: 32 (ref 9–20)
CALCIUM SERPL-MCNC: 10.1 MG/DL (ref 8.6–10.4)
CHLORIDE BLD-SCNC: 100 MMOL/L (ref 98–107)
CO2: 24 MMOL/L (ref 20–31)
CREAT SERPL-MCNC: 0.95 MG/DL (ref 0.5–0.9)
D-DIMER QUANTITATIVE: 0.63 MG/L FEU (ref 0–0.59)
DIFFERENTIAL TYPE: ABNORMAL
EOSINOPHILS RELATIVE PERCENT: 0 % (ref 1–4)
GFR AFRICAN AMERICAN: >60 ML/MIN
GFR NON-AFRICAN AMERICAN: >60 ML/MIN
GFR SERPL CREATININE-BSD FRML MDRD: ABNORMAL ML/MIN/{1.73_M2}
GFR SERPL CREATININE-BSD FRML MDRD: ABNORMAL ML/MIN/{1.73_M2}
GLUCOSE BLD-MCNC: 213 MG/DL (ref 74–100)
GLUCOSE BLD-MCNC: 239 MG/DL (ref 74–100)
GLUCOSE BLD-MCNC: 260 MG/DL (ref 74–100)
GLUCOSE BLD-MCNC: 284 MG/DL (ref 70–99)
HCT VFR BLD CALC: 42.9 % (ref 36.3–47.1)
HEMOGLOBIN: 13.5 G/DL (ref 11.9–15.1)
IMMATURE GRANULOCYTES: 0 %
LYMPHOCYTES # BLD: 8 % (ref 24–43)
MCH RBC QN AUTO: 29.9 PG (ref 25.2–33.5)
MCHC RBC AUTO-ENTMCNC: 31.5 G/DL (ref 28.4–34.8)
MCV RBC AUTO: 94.9 FL (ref 82.6–102.9)
MONOCYTES # BLD: 4 % (ref 3–12)
NRBC AUTOMATED: 0.2 PER 100 WBC
PDW BLD-RTO: 12.8 % (ref 11.8–14.4)
PLATELET # BLD: 242 K/UL (ref 138–453)
PLATELET ESTIMATE: ABNORMAL
PMV BLD AUTO: 12.1 FL (ref 8.1–13.5)
POTASSIUM SERPL-SCNC: 3.9 MMOL/L (ref 3.7–5.3)
PRO-BNP: 265 PG/ML
RBC # BLD: 4.52 M/UL (ref 3.95–5.11)
RBC # BLD: ABNORMAL 10*6/UL
SEG NEUTROPHILS: 87 % (ref 36–65)
SEGMENTED NEUTROPHILS ABSOLUTE COUNT: 7.58 K/UL (ref 1.5–8.1)
SODIUM BLD-SCNC: 136 MMOL/L (ref 135–144)
TOTAL PROTEIN: 8.3 G/DL (ref 6.4–8.3)
TROPONIN INTERP: NORMAL
TROPONIN INTERP: NORMAL
TROPONIN T: NORMAL NG/ML
TROPONIN T: NORMAL NG/ML
TROPONIN, HIGH SENSITIVITY: 6 NG/L (ref 0–14)
TROPONIN, HIGH SENSITIVITY: <6 NG/L (ref 0–14)
WBC # BLD: 8.7 K/UL (ref 3.5–11.3)
WBC # BLD: ABNORMAL 10*3/UL

## 2021-03-20 PROCEDURE — 82947 ASSAY GLUCOSE BLOOD QUANT: CPT

## 2021-03-20 PROCEDURE — 93005 ELECTROCARDIOGRAM TRACING: CPT | Performed by: FAMILY MEDICINE

## 2021-03-20 PROCEDURE — 96374 THER/PROPH/DIAG INJ IV PUSH: CPT

## 2021-03-20 PROCEDURE — 6360000002 HC RX W HCPCS: Performed by: FAMILY MEDICINE

## 2021-03-20 PROCEDURE — 2580000003 HC RX 258: Performed by: FAMILY MEDICINE

## 2021-03-20 PROCEDURE — 6360000002 HC RX W HCPCS: Performed by: PHYSICIAN ASSISTANT

## 2021-03-20 PROCEDURE — 36415 COLL VENOUS BLD VENIPUNCTURE: CPT

## 2021-03-20 PROCEDURE — 71045 X-RAY EXAM CHEST 1 VIEW: CPT

## 2021-03-20 PROCEDURE — 70496 CT ANGIOGRAPHY HEAD: CPT

## 2021-03-20 PROCEDURE — 6360000004 HC RX CONTRAST MEDICATION: Performed by: PHYSICIAN ASSISTANT

## 2021-03-20 PROCEDURE — 83036 HEMOGLOBIN GLYCOSYLATED A1C: CPT

## 2021-03-20 PROCEDURE — 70450 CT HEAD/BRAIN W/O DYE: CPT

## 2021-03-20 PROCEDURE — 84484 ASSAY OF TROPONIN QUANT: CPT

## 2021-03-20 PROCEDURE — 96376 TX/PRO/DX INJ SAME DRUG ADON: CPT

## 2021-03-20 PROCEDURE — 6370000000 HC RX 637 (ALT 250 FOR IP): Performed by: PHYSICIAN ASSISTANT

## 2021-03-20 PROCEDURE — G0378 HOSPITAL OBSERVATION PER HR: HCPCS

## 2021-03-20 PROCEDURE — 94761 N-INVAS EAR/PLS OXIMETRY MLT: CPT

## 2021-03-20 PROCEDURE — 71260 CT THORAX DX C+: CPT

## 2021-03-20 PROCEDURE — 96372 THER/PROPH/DIAG INJ SC/IM: CPT

## 2021-03-20 PROCEDURE — 85379 FIBRIN DEGRADATION QUANT: CPT

## 2021-03-20 PROCEDURE — 2580000003 HC RX 258: Performed by: PHYSICIAN ASSISTANT

## 2021-03-20 PROCEDURE — 85025 COMPLETE CBC W/AUTO DIFF WBC: CPT

## 2021-03-20 PROCEDURE — 80053 COMPREHEN METABOLIC PANEL: CPT

## 2021-03-20 PROCEDURE — 99283 EMERGENCY DEPT VISIT LOW MDM: CPT

## 2021-03-20 PROCEDURE — 6370000000 HC RX 637 (ALT 250 FOR IP): Performed by: FAMILY MEDICINE

## 2021-03-20 PROCEDURE — 96375 TX/PRO/DX INJ NEW DRUG ADDON: CPT

## 2021-03-20 PROCEDURE — 83880 ASSAY OF NATRIURETIC PEPTIDE: CPT

## 2021-03-20 RX ORDER — NICOTINE POLACRILEX 4 MG
15 LOZENGE BUCCAL PRN
Status: DISCONTINUED | OUTPATIENT
Start: 2021-03-20 | End: 2021-03-21 | Stop reason: HOSPADM

## 2021-03-20 RX ORDER — BACLOFEN 10 MG/1
20 TABLET ORAL 2 TIMES DAILY
Status: DISCONTINUED | OUTPATIENT
Start: 2021-03-20 | End: 2021-03-20

## 2021-03-20 RX ORDER — MECLIZINE HCL 12.5 MG/1
25 TABLET ORAL ONCE
Status: COMPLETED | OUTPATIENT
Start: 2021-03-20 | End: 2021-03-20

## 2021-03-20 RX ORDER — SODIUM CHLORIDE 0.9 % (FLUSH) 0.9 %
10 SYRINGE (ML) INJECTION EVERY 12 HOURS SCHEDULED
Status: DISCONTINUED | OUTPATIENT
Start: 2021-03-20 | End: 2021-03-21 | Stop reason: HOSPADM

## 2021-03-20 RX ORDER — ONDANSETRON 2 MG/ML
4 INJECTION INTRAMUSCULAR; INTRAVENOUS ONCE
Status: COMPLETED | OUTPATIENT
Start: 2021-03-20 | End: 2021-03-20

## 2021-03-20 RX ORDER — PROMETHAZINE HYDROCHLORIDE 25 MG/1
12.5 TABLET ORAL EVERY 6 HOURS PRN
Status: DISCONTINUED | OUTPATIENT
Start: 2021-03-20 | End: 2021-03-21 | Stop reason: HOSPADM

## 2021-03-20 RX ORDER — CLOPIDOGREL BISULFATE 75 MG/1
75 TABLET ORAL DAILY
Status: DISCONTINUED | OUTPATIENT
Start: 2021-03-21 | End: 2021-03-21 | Stop reason: HOSPADM

## 2021-03-20 RX ORDER — VITAMIN B COMPLEX
5000 TABLET ORAL 3 TIMES DAILY
Status: DISCONTINUED | OUTPATIENT
Start: 2021-03-20 | End: 2021-03-21 | Stop reason: HOSPADM

## 2021-03-20 RX ORDER — BACLOFEN 10 MG/1
20 TABLET ORAL 4 TIMES DAILY
Status: DISCONTINUED | OUTPATIENT
Start: 2021-03-20 | End: 2021-03-21 | Stop reason: HOSPADM

## 2021-03-20 RX ORDER — ONDANSETRON 2 MG/ML
4 INJECTION INTRAMUSCULAR; INTRAVENOUS EVERY 6 HOURS PRN
Status: DISCONTINUED | OUTPATIENT
Start: 2021-03-20 | End: 2021-03-21 | Stop reason: HOSPADM

## 2021-03-20 RX ORDER — FUROSEMIDE 20 MG/1
20 TABLET ORAL DAILY
Status: DISCONTINUED | OUTPATIENT
Start: 2021-03-20 | End: 2021-03-21 | Stop reason: HOSPADM

## 2021-03-20 RX ORDER — 0.9 % SODIUM CHLORIDE 0.9 %
1000 INTRAVENOUS SOLUTION INTRAVENOUS ONCE
Status: COMPLETED | OUTPATIENT
Start: 2021-03-20 | End: 2021-03-20

## 2021-03-20 RX ORDER — SODIUM CHLORIDE 9 MG/ML
INJECTION, SOLUTION INTRAVENOUS CONTINUOUS
Status: DISCONTINUED | OUTPATIENT
Start: 2021-03-20 | End: 2021-03-21 | Stop reason: HOSPADM

## 2021-03-20 RX ORDER — NITROGLYCERIN 0.4 MG/1
0.4 TABLET SUBLINGUAL EVERY 5 MIN PRN
Status: DISCONTINUED | OUTPATIENT
Start: 2021-03-20 | End: 2021-03-21 | Stop reason: HOSPADM

## 2021-03-20 RX ORDER — ACETAMINOPHEN 325 MG/1
650 TABLET ORAL EVERY 6 HOURS PRN
Status: DISCONTINUED | OUTPATIENT
Start: 2021-03-20 | End: 2021-03-21 | Stop reason: HOSPADM

## 2021-03-20 RX ORDER — LANOLIN ALCOHOL/MO/W.PET/CERES
500 CREAM (GRAM) TOPICAL EVERY MORNING
Status: DISCONTINUED | OUTPATIENT
Start: 2021-03-21 | End: 2021-03-21 | Stop reason: HOSPADM

## 2021-03-20 RX ORDER — SODIUM CHLORIDE 0.9 % (FLUSH) 0.9 %
10 SYRINGE (ML) INJECTION PRN
Status: DISCONTINUED | OUTPATIENT
Start: 2021-03-20 | End: 2021-03-21 | Stop reason: HOSPADM

## 2021-03-20 RX ORDER — FAMOTIDINE 20 MG/1
20 TABLET, FILM COATED ORAL 2 TIMES DAILY
Status: DISCONTINUED | OUTPATIENT
Start: 2021-03-20 | End: 2021-03-21 | Stop reason: HOSPADM

## 2021-03-20 RX ORDER — LEVOTHYROXINE SODIUM 0.1 MG/1
200 TABLET ORAL DAILY
Status: DISCONTINUED | OUTPATIENT
Start: 2021-03-21 | End: 2021-03-21 | Stop reason: HOSPADM

## 2021-03-20 RX ORDER — DEXTROSE MONOHYDRATE 25 G/50ML
12.5 INJECTION, SOLUTION INTRAVENOUS PRN
Status: DISCONTINUED | OUTPATIENT
Start: 2021-03-20 | End: 2021-03-21 | Stop reason: HOSPADM

## 2021-03-20 RX ORDER — DEXTROSE MONOHYDRATE 50 MG/ML
100 INJECTION, SOLUTION INTRAVENOUS PRN
Status: DISCONTINUED | OUTPATIENT
Start: 2021-03-20 | End: 2021-03-21 | Stop reason: HOSPADM

## 2021-03-20 RX ORDER — ATORVASTATIN CALCIUM 40 MG/1
40 TABLET, FILM COATED ORAL NIGHTLY
Status: DISCONTINUED | OUTPATIENT
Start: 2021-03-20 | End: 2021-03-21 | Stop reason: HOSPADM

## 2021-03-20 RX ORDER — BACLOFEN 10 MG/1
10 TABLET ORAL 4 TIMES DAILY PRN
Status: DISCONTINUED | OUTPATIENT
Start: 2021-03-20 | End: 2021-03-21 | Stop reason: HOSPADM

## 2021-03-20 RX ORDER — METHYLPREDNISOLONE SODIUM SUCCINATE 40 MG/ML
40 INJECTION, POWDER, LYOPHILIZED, FOR SOLUTION INTRAMUSCULAR; INTRAVENOUS ONCE
Status: COMPLETED | OUTPATIENT
Start: 2021-03-20 | End: 2021-03-20

## 2021-03-20 RX ORDER — ACETAMINOPHEN 650 MG/1
650 SUPPOSITORY RECTAL EVERY 6 HOURS PRN
Status: DISCONTINUED | OUTPATIENT
Start: 2021-03-20 | End: 2021-03-21 | Stop reason: HOSPADM

## 2021-03-20 RX ORDER — ASPIRIN 81 MG/1
81 TABLET ORAL DAILY
Status: DISCONTINUED | OUTPATIENT
Start: 2021-03-20 | End: 2021-03-21 | Stop reason: HOSPADM

## 2021-03-20 RX ORDER — POLYETHYLENE GLYCOL 3350 17 G/17G
17 POWDER, FOR SOLUTION ORAL DAILY PRN
Status: DISCONTINUED | OUTPATIENT
Start: 2021-03-20 | End: 2021-03-21 | Stop reason: HOSPADM

## 2021-03-20 RX ADMIN — BACLOFEN 20 MG: 10 TABLET ORAL at 21:25

## 2021-03-20 RX ADMIN — ENOXAPARIN SODIUM 40 MG: 40 INJECTION SUBCUTANEOUS at 18:09

## 2021-03-20 RX ADMIN — SODIUM CHLORIDE 1000 ML: 9 INJECTION, SOLUTION INTRAVENOUS at 12:28

## 2021-03-20 RX ADMIN — SODIUM CHLORIDE: 9 INJECTION, SOLUTION INTRAVENOUS at 17:29

## 2021-03-20 RX ADMIN — ONDANSETRON 4 MG: 2 INJECTION INTRAMUSCULAR; INTRAVENOUS at 13:12

## 2021-03-20 RX ADMIN — ONDANSETRON 4 MG: 2 INJECTION INTRAMUSCULAR; INTRAVENOUS at 12:09

## 2021-03-20 RX ADMIN — IOPAMIDOL 75 ML: 755 INJECTION, SOLUTION INTRAVENOUS at 13:21

## 2021-03-20 RX ADMIN — ONDANSETRON 4 MG: 2 INJECTION INTRAMUSCULAR; INTRAVENOUS at 18:13

## 2021-03-20 RX ADMIN — METHYLPREDNISOLONE SODIUM SUCCINATE 40 MG: 40 INJECTION, POWDER, FOR SOLUTION INTRAMUSCULAR; INTRAVENOUS at 13:14

## 2021-03-20 RX ADMIN — FAMOTIDINE 20 MG: 20 TABLET ORAL at 20:14

## 2021-03-20 RX ADMIN — MECLIZINE 25 MG: 12.5 TABLET ORAL at 13:55

## 2021-03-20 RX ADMIN — Medication 5000 UNITS: at 20:14

## 2021-03-20 ASSESSMENT — ENCOUNTER SYMPTOMS
BACK PAIN: 0
DIARRHEA: 0
EYE REDNESS: 0
SORE THROAT: 0
SHORTNESS OF BREATH: 0
COUGH: 0
CONSTIPATION: 0
EYE DISCHARGE: 0
BLOOD IN STOOL: 0
WHEEZING: 0
VOMITING: 1
ABDOMINAL PAIN: 0
NAUSEA: 1
RHINORRHEA: 0
CHEST TIGHTNESS: 0

## 2021-03-20 NOTE — ED NOTES
Called Dr Adrianne Barr via answering service, connected call to [facility].       Roc Enter  03/20/21 4715

## 2021-03-20 NOTE — PROGRESS NOTES
Patient arrived to the floor via cart. Able to stand and pivot to bed.  at bedside. Nausea at this time, requesting jello. Will monitor.

## 2021-03-20 NOTE — H&P
[facility]  History and Physical        Patient:  Joie Miller  MRN: [id number]    Chief Complaint:    Chief Complaint   Patient presents with    Emesis     onset about 30 minPTA    Dizziness     onset before the emesis       History Obtained From:  patient, electronic medical record  PCP: Ginny Lobato DO    History of Present Illness: The patient is a 63 y.o. female who presents with sudden onset of nausea,vomiting and dizziness. She was treated initiallyin er with iv fluids and antiemetics and continued to be symptomatic. Her labs were indicative of dehydration and hence admitted for observation    Past Medical History:        Diagnosis Date    Alveolar hypoventilation     Dermatitis herpetiformis     GERD (gastroesophageal reflux disease)     Gout     Hearing loss     Hyperlipidemia     Hypertension     Hypothyroidism     Morbid obesity ([facility] [address]     Multiple sclerosis (HCC)     Neuropathy     MINH (obstructive sleep apnea)     intolerant of cpap    PAD (peripheral artery disease) (HCC)     SVT (supraventricular tachycardia) (HCC)     Type II or unspecified type diabetes mellitus without mention of complication, not stated as uncontrolled        Past Surgical History:        Procedure Laterality Date    CARDIAC CATHETERIZATION Left 2018    right radial/[facility]/Dr Pollard    CARPAL TUNNEL RELEASE      left    CERVICAL DISC SURGERY       SECTION          DILATION AND CURETTAGE OF UTERUS      HYSTERECTOMY  2006    cervical dysplasia       Medications Prior to Admission:    Prior to Admission medications    Medication Sig Start Date End Date Taking?  Authorizing Provider   vitamin D-3 (CHOLECALCIFEROL) 5000 units TABS Take 5,000 Units by mouth 3 times daily   Yes Historical Provider, MD   aspirin 81 MG EC tablet Take 1 tablet by mouth daily 18  Yes Zenia Bone MD   metoprolol tartrate (LOPRESSOR) 25 MG tablet Take 1 tablet by mouth 2 times daily  Patient taking differently: Take 25 mg by mouth daily  12/8/18  Yes Mercy Portillo MD   clopidogrel (PLAVIX) 75 MG tablet Take 1 tablet by mouth daily 12/9/18  Yes Mercy Portillo MD   furosemide (LASIX) 20 MG tablet Take 20 mg by mouth daily   Yes Historical Provider, MD   levothyroxine (SYNTHROID) 200 MCG tablet Take 1 tablet by mouth Daily 4/10/18  Yes Yakov West MD   baclofen (LIORESAL) 10 MG tablet Take 2 tab four times daily. Patient taking differently: Take 10 mg by mouth 4 times daily as needed  11/10/14  Yes Ruiz Mccartney MD   metformin (GLUCOPHAGE) 1000 MG tablet Take 1 tablet by mouth daily (with breakfast). Patient taking differently: Take 500 mg by mouth 2 times daily (with meals) Indications: resume after repeating bun/cr if normal 1000mg in AM, 500mg at dinner 3/20/13  Yes Janice Sibley, APRN - CNP   Magnesium 250 MG TABS Take 500 mg by mouth every morning    Yes Historical Provider, MD   NONFORMULARY Take 1 tablet by mouth daily OTC Potassium supplement    Historical Provider, MD   atorvastatin (LIPITOR) 40 MG tablet Take 1 tablet by mouth nightly 12/8/18   Mercy Portillo MD   nitroGLYCERIN (NITROSTAT) 0.4 MG SL tablet up to max of 3 total doses. If no relief after 1 dose, call 911. 6/28/18   Davion Briseno MD   OXYGEN 2 lpm @ nasal cannula, continuous, both portable and stationary systems. Patient taking differently: Inhale into the lungs as needed 2 lpm @ nasal cannula, continuous, both portable and stationary systems. 5/17/12   Flo Messina DO       Allergies:  Bee venom, Erythromycin, and Janumet [sitagliptin-metformin hcl er]    Social History:   TOBACCO:   reports that she has never smoked. She has never used smokeless tobacco.  ETOH:   reports no history of alcohol use.     Family History:       Problem Relation Age of Onset    Diabetes Mother     High Cholesterol Mother     Hypertension Mother     Heart Disease Father     Hypertension Father    Price Other Father         abdominal aneurysm    Mult Sclerosis Sister     Diabetes Maternal Grandmother     Diabetes Maternal Grandfather     Colon Cancer Paternal Grandmother     Other Paternal Grandfather         brain aneurysm    Colon Polyps Other         Aunts with colon polyps       Review of Systems:  Constitutional:negative  for fevers, and negative for chills. Positive for dizziness  Respiratory: negative for shortness of breath, negative for cough, and negative for wheezing  Cardiovascular: negative for chest pain, negative for palpitations, and negative for syncope  Gastrointestinal: negative for abdominal pain, positive for nausea,positive for vomiting, negative for diarrhea, negative for constipation, and negative for hematochezia or melena  Genitourinary: negative for dysuria, negative for urinary urgency, negative for urinary frequency, and negative for hematuria  Neurological: negative for unilateral weakness, numbness or tingling. All other systems were reviewed with the patient and are negative except as stated    Objective:    Vitals:   Temp: 98.6 °F (37 °C)  BP: (!) 143/85  Resp: 18  Pulse: 74  SpO2: 96 %  -----------------------------------------------------------------  Exam:  GEN:    Awake, alert and oriented x3. EYES:  EOMI, pupils equal   Mouth- dry mucous membranes  NECK: Supple. No lymphadenopathy. No carotid bruit  CVS:    regular rate and rhythm, no audible murmur  PULM:  CTA, no wheezes, rales or rhonchi, no acute respiratory distress  ABD:    Bowels sounds normal.  Abdomen is soft. No distention. no tenderness to palpation. EXT:   no edema bilaterally . No calf tenderness. NEURO: Moves all extremities. Motor and sensory are grossly intact  SKIN:  No rashes.   No skin lesions.    -----------------------------------------------------------------  Diagnostic Data:   · All diagnostic data was reviewed  Lab Results   Component Value Date    WBC 8.7 03/20/2021    HGB 13.5 03/20/2021 MCV 94.9 03/20/2021     03/20/2021      Lab Results   Component Value Date    GLUCOSE 284 (H) 03/20/2021    BUN 30 (H) 03/20/2021    CREATININE 0.95 (H) 03/20/2021     03/20/2021    K 3.9 03/20/2021    CALCIUM 10.1 03/20/2021     03/20/2021    CO2 24 03/20/2021     Lab Results   Component Value Date    WBCUA 0 TO 2 05/29/2019    RBCUA 0 TO 2 05/29/2019    EPITHUA 5 TO 10 05/29/2019    LEUKOCYTESUR SMALL (A) 05/29/2019    SPECGRAV <1.005 (L) 05/29/2019    GLUCOSEU NEGATIVE 05/29/2019    KETUA NEGATIVE 05/29/2019    PROTEINU NEGATIVE 05/29/2019    HGBUR NEGATIVE 05/29/2019    CASTUA NOT REPORTED 05/29/2019    CRYSTUA NOT REPORTED 05/29/2019    BACTERIA TRACE (A) 05/29/2019    YEAST NOT REPORTED 05/29/2019       Assessment:     Principal Problem:    Intractable vomiting with nausea  Active Problems:    Multiple sclerosis (HCC)    Type 2 diabetes mellitus without complication, without long-term current use of insulin (HCC)    Coronary artery disease involving native coronary artery of native heart without angina pectoris    Dehydration    Intractable nausea and vomiting  Resolved Problems:    * No resolved hospital problems. *      Plan:     · This patient requires overnight observation because of persistant nausea and vomiting with dehydration requiring iv fliuids,antiemetics  · Factors affecting the medical complexity of this patient include type 2 dm,h/o Minere's disease of lt ear,MS,cad,htn  · Estimated length of stay is 1 days  · Discussed patient's symptoms and data results including labs and imaging studies with the ER MD at time of admission  · High risk drug monitoring: none  ·     CORE MEASURES  · DVT prophylaxis: Lovenox  · Decubitus ulcer present on admission: No  · CODE STATUS: FULL CODE  · Nutrition Status: fair   · Physical therapy: Yes   · Old Charts reviewed: Yes  · EKG Reviewed:  Yes  · Advance Directive Addressed: Yes    Inga Chisholm M.D.  3/20/2021  5:00 PM

## 2021-03-20 NOTE — ED NOTES
Pt is given a small amount of water to ensure she can keep it down before she takes the [address], RN  03/20/21 5296

## 2021-03-20 NOTE — ED PROVIDER NOTES
Genitourinary: Negative for decreased urine volume, difficulty urinating, dysuria, frequency and hematuria. Musculoskeletal: Negative for arthralgias, back pain and myalgias. Skin: Negative for pallor and rash. Allergic/Immunologic: Negative for food allergies and immunocompromised state. Neurological: Positive for dizziness. Negative for syncope, weakness and light-headedness. Hematological: Negative for adenopathy. Does not bruise/bleed easily. Psychiatric/Behavioral: Negative for behavioral problems and suicidal ideas. The patient is not nervous/anxious. Except as noted above the remainder of the review of systems was reviewed and negative. PAST MEDICAL HISTORY     Past Medical History:   Diagnosis Date    Alveolar hypoventilation     Dermatitis herpetiformis     GERD (gastroesophageal reflux disease)     Gout     Hearing loss     Hyperlipidemia     Hypertension     Hypothyroidism     Morbid obesity ([address]     Multiple sclerosis (HCC)     Neuropathy     MINH (obstructive sleep apnea)     intolerant of cpap    PAD (peripheral artery disease) (HCC)     SVT (supraventricular tachycardia) (HCC)     Type II or unspecified type diabetes mellitus without mention of complication, not stated as uncontrolled          SURGICALHISTORY       Past Surgical History:   Procedure Laterality Date    CARDIAC CATHETERIZATION Left 2018    right radial/[facility]/Dr Pollard    CARPAL TUNNEL RELEASE      left    CERVICAL DISC SURGERY       SECTION          DILATION AND CURETTAGE OF UTERUS      HYSTERECTOMY  2006    cervical dysplasia         CURRENT MEDICATIONS       Previous Medications    ASPIRIN 81 MG EC TABLET    Take 1 tablet by mouth daily    ATORVASTATIN (LIPITOR) 40 MG TABLET    Take 1 tablet by mouth nightly    BACLOFEN (LIORESAL) 10 MG TABLET    Take 2 tab four times daily.     CLOPIDOGREL (PLAVIX) 75 MG TABLET    Take 1 tablet by mouth daily    FUROSEMIDE Drug use: No    Sexual activity: Yes     Partners: Male   Lifestyle    Physical activity     Days per week: None     Minutes per session: None    Stress: None   Relationships    Social connections     Talks on phone: None     Gets together: None     Attends Gnosticist service: None     Active member of club or organization: None     Attends meetings of clubs or organizations: None     Relationship status: None    Intimate partner violence     Fear of current or ex partner: None     Emotionally abused: None     Physically abused: None     Forced sexual activity: None   Other Topics Concern    None   Social History Narrative    None       SCREENINGS    [place] Coma Scale  Eye Opening: Spontaneous  Best Verbal Response: Oriented  Best Motor Response: Obeys commands  [place] Coma Scale Score: 15        PHYSICAL EXAM    (up to 7 for level 4, 8 or more for level 5)     ED Triage Vitals   BP Temp Temp Source Pulse Resp SpO2 Height Weight   03/20/21 1145 03/20/21 1137 03/20/21 1137 03/20/21 1147 03/20/21 1137 03/20/21 1145 -- 03/20/21 1137   (!) 202/106 97.4 °F (36.3 °C) Tympanic 101 16 98 %  190 lb (86.2 kg)       Physical Exam  Vitals signs and nursing note reviewed. Constitutional:       General: She is not in acute distress. Appearance: She is well-developed. She is ill-appearing. She is not diaphoretic. HENT:      Head: Normocephalic and atraumatic. Right Ear: External ear normal.      Left Ear: External ear normal.      Mouth/Throat:      Mouth: Mucous membranes are moist.      Pharynx: No oropharyngeal exudate. Eyes:      General: No scleral icterus. Right eye: No discharge. Left eye: No discharge. Conjunctiva/sclera: Conjunctivae normal.      Pupils: Pupils are equal, round, and reactive to light. Neck:      Musculoskeletal: Normal range of motion and neck supple. Thyroid: No thyromegaly. Trachea: No tracheal deviation.    Cardiovascular:      Rate and Rhythm: Normal rate and regular rhythm. Heart sounds: No murmur. No friction rub. No gallop. Pulmonary:      Effort: Pulmonary effort is normal. No respiratory distress. Breath sounds: Normal breath sounds. No stridor. No wheezing, rhonchi or rales. Abdominal:      General: Bowel sounds are normal. There is no distension. Palpations: Abdomen is soft. Tenderness: There is no abdominal tenderness. There is no guarding or rebound. Musculoskeletal: Normal range of motion. General: No tenderness or deformity. Lymphadenopathy:      Cervical: No cervical adenopathy. Skin:     General: Skin is warm and dry. Capillary Refill: Capillary refill takes less than 2 seconds. Findings: No erythema or rash. Neurological:      General: No focal deficit present. Mental Status: She is alert and oriented to person, place, and time. Cranial Nerves: No cranial nerve deficit. Motor: No weakness or abnormal muscle tone. Deep Tendon Reflexes: Reflexes are normal and symmetric. Reflexes normal.   Psychiatric:         Mood and Affect: Mood normal.         Behavior: Behavior normal.         DIAGNOSTIC RESULTS     EKG: All EKG's are interpreted by the Emergency Department Physician who either signs orCo-signs this chart in the absence of a cardiologist.      RADIOLOGY:   Non-plainfilm images such as CT, Ultrasound and MRI are read by the radiologist. Plain radiographic images are visualized and preliminarily interpreted by the emergency physician with the below findings:      Interpretationper the Radiologist below, if available at the time of this note:    CT CHEST PULMONARY EMBOLISM W CONTRAST   Final Result   1. No evidence of pulmonary embolism or acute pulmonary abnormality. 2.  Stable left adrenal nodule since at least 2014, consistent with a benign   process. 3.  Partially visualized distended gallbladder.          CTA HEAD NECK W CONTRAST   Final Result   No flow out of acute infection dehydration with joint imbalance, arrhythmia ACS. Repeat evaluations patient is still with persistent dizziness. CT head is negative. I am going to order a CTA head and neck. She also had a bump D-dimer. And one episode of mid chest discomfort that is completely resolved now but will get CT chest with this. CT head and neck did not show any flow-limiting stenosis. No stroke noted. At this point she is still with dizziness I think it is more vertiginous in nature. Will admit her to the hospital for further evaluation. I called and spoke with hospitalist, Naveed Landis, who is aware patient presentation work-up here in the ER. I explained to him her symptoms and her persistent dizziness. I did discuss the distended gallbladder but normal LFTs and no active abdominal pain this time. He will admit her for further evaluation and treatment. Patient agrees with plan all questions answered. Procedures    FINAL IMPRESSION      1. Benign paroxysmal positional vertigo, unspecified laterality    2. Nausea and vomiting, intractability of vomiting not specified, unspecified vomiting type        DISPOSITION/PLAN   DISPOSITION Decision To Admit 03/20/2021 03:45:23 PM      PATIENT REFERRED TO:  No follow-up provider specified.     DISCHARGE MEDICATIONS:  New Prescriptions    No medications on file              Summation      Patient Course:      ED Medications administered this visit:    Medications   ondansetron (ZOFRAN) injection 4 mg (4 mg Intravenous Given 3/20/21 1209)   0.9 % sodium chloride bolus (0 mLs Intravenous Stopped 3/20/21 1429)   meclizine (ANTIVERT) tablet 25 mg (25 mg Oral Given 3/20/21 1355)   methylPREDNISolone sodium (SOLU-MEDROL) injection 40 mg (40 mg Intravenous Given 3/20/21 1314)   ondansetron (ZOFRAN) injection 4 mg (4 mg Intravenous Given 3/20/21 1312)   iopamidol (ISOVUE-370) 76 % injection 75 mL (75 mLs Intravenous Given 3/20/21 1321)       New Prescriptions from this visit:    New Prescriptions    No medications on file       Follow-up:  No follow-up provider specified. Final Impression:   1. Benign paroxysmal positional vertigo, unspecified laterality    2.  Nausea and vomiting, intractability of vomiting not specified, unspecified vomiting type               (Please note that portions of this note were completed with a voice recognition program.  Efforts were made to edit the dictations but occasionally words are mis-transcribed.)           VINCENT WrayC  03/20/21 0572

## 2021-03-21 VITALS
TEMPERATURE: 98.4 F | SYSTOLIC BLOOD PRESSURE: 149 MMHG | WEIGHT: 202.82 LBS | HEART RATE: 77 BPM | OXYGEN SATURATION: 98 % | HEIGHT: 65 IN | RESPIRATION RATE: 16 BRPM | DIASTOLIC BLOOD PRESSURE: 76 MMHG | BODY MASS INDEX: 33.79 KG/M2

## 2021-03-21 LAB
ANION GAP SERPL CALCULATED.3IONS-SCNC: 8 MMOL/L (ref 9–17)
BUN BLDV-MCNC: 18 MG/DL (ref 6–20)
BUN/CREAT BLD: 23 (ref 9–20)
CALCIUM SERPL-MCNC: 9.1 MG/DL (ref 8.6–10.4)
CHLORIDE BLD-SCNC: 105 MMOL/L (ref 98–107)
CO2: 26 MMOL/L (ref 20–31)
CREAT SERPL-MCNC: 0.8 MG/DL (ref 0.5–0.9)
EKG ATRIAL RATE: 83 BPM
EKG P AXIS: 50 DEGREES
EKG P-R INTERVAL: 140 MS
EKG Q-T INTERVAL: 370 MS
EKG QRS DURATION: 92 MS
EKG QTC CALCULATION (BAZETT): 434 MS
EKG R AXIS: 31 DEGREES
EKG T AXIS: 39 DEGREES
EKG VENTRICULAR RATE: 83 BPM
GFR AFRICAN AMERICAN: >60 ML/MIN
GFR NON-AFRICAN AMERICAN: >60 ML/MIN
GFR SERPL CREATININE-BSD FRML MDRD: ABNORMAL ML/MIN/{1.73_M2}
GFR SERPL CREATININE-BSD FRML MDRD: ABNORMAL ML/MIN/{1.73_M2}
GLUCOSE BLD-MCNC: 184 MG/DL (ref 74–100)
GLUCOSE BLD-MCNC: 202 MG/DL (ref 70–99)
HCT VFR BLD CALC: 35.6 % (ref 36.3–47.1)
HEMOGLOBIN: 11.3 G/DL (ref 11.9–15.1)
MCH RBC QN AUTO: 29.9 PG (ref 25.2–33.5)
MCHC RBC AUTO-ENTMCNC: 31.7 G/DL (ref 28.4–34.8)
MCV RBC AUTO: 94.2 FL (ref 82.6–102.9)
NRBC AUTOMATED: 0 PER 100 WBC
PDW BLD-RTO: 12.9 % (ref 11.8–14.4)
PLATELET # BLD: 226 K/UL (ref 138–453)
PMV BLD AUTO: 11.9 FL (ref 8.1–13.5)
POTASSIUM SERPL-SCNC: 3.7 MMOL/L (ref 3.7–5.3)
RBC # BLD: 3.78 M/UL (ref 3.95–5.11)
SODIUM BLD-SCNC: 139 MMOL/L (ref 135–144)
WBC # BLD: 5.7 K/UL (ref 3.5–11.3)

## 2021-03-21 PROCEDURE — 97530 THERAPEUTIC ACTIVITIES: CPT

## 2021-03-21 PROCEDURE — 97162 PT EVAL MOD COMPLEX 30 MIN: CPT

## 2021-03-21 PROCEDURE — 36415 COLL VENOUS BLD VENIPUNCTURE: CPT

## 2021-03-21 PROCEDURE — G0378 HOSPITAL OBSERVATION PER HR: HCPCS

## 2021-03-21 PROCEDURE — 82947 ASSAY GLUCOSE BLOOD QUANT: CPT

## 2021-03-21 PROCEDURE — 85027 COMPLETE CBC AUTOMATED: CPT

## 2021-03-21 PROCEDURE — 6370000000 HC RX 637 (ALT 250 FOR IP): Performed by: FAMILY MEDICINE

## 2021-03-21 PROCEDURE — 2580000003 HC RX 258: Performed by: FAMILY MEDICINE

## 2021-03-21 PROCEDURE — 80048 BASIC METABOLIC PNL TOTAL CA: CPT

## 2021-03-21 PROCEDURE — 93010 ELECTROCARDIOGRAM REPORT: CPT | Performed by: INTERNAL MEDICINE

## 2021-03-21 RX ORDER — BACLOFEN 20 MG/1
20 TABLET ORAL 4 TIMES DAILY
Qty: 90 TABLET | Refills: 0 | Status: SHIPPED
Start: 2021-03-21

## 2021-03-21 RX ORDER — ONDANSETRON 4 MG/1
4 TABLET, ORALLY DISINTEGRATING ORAL EVERY 8 HOURS PRN
Qty: 15 TABLET | Refills: 0 | Status: SHIPPED | OUTPATIENT
Start: 2021-03-21

## 2021-03-21 RX ADMIN — BACLOFEN 20 MG: 10 TABLET ORAL at 03:50

## 2021-03-21 RX ADMIN — CLOPIDOGREL BISULFATE 75 MG: 75 TABLET ORAL at 09:14

## 2021-03-21 RX ADMIN — SODIUM CHLORIDE: 9 INJECTION, SOLUTION INTRAVENOUS at 03:47

## 2021-03-21 RX ADMIN — METOPROLOL TARTRATE 25 MG: 25 TABLET, FILM COATED ORAL at 09:14

## 2021-03-21 RX ADMIN — LEVOTHYROXINE SODIUM 200 MCG: 100 TABLET ORAL at 07:19

## 2021-03-21 RX ADMIN — Medication 500 MG: at 09:14

## 2021-03-21 RX ADMIN — FAMOTIDINE 20 MG: 20 TABLET ORAL at 09:14

## 2021-03-21 RX ADMIN — ASPIRIN 81 MG: 81 TABLET, COATED ORAL at 09:14

## 2021-03-21 RX ADMIN — METFORMIN HYDROCHLORIDE 500 MG: 500 TABLET ORAL at 09:18

## 2021-03-21 RX ADMIN — Medication 5000 UNITS: at 09:14

## 2021-03-21 RX ADMIN — FUROSEMIDE 20 MG: 20 TABLET ORAL at 09:14

## 2021-03-21 RX ADMIN — BACLOFEN 20 MG: 10 TABLET ORAL at 09:14

## 2021-03-21 NOTE — PROGRESS NOTES
Physical Therapy    Facility/Department: [address] [place] [address] MED SURG  Initial Assessment    NAME: Aleks Salgado  : 1960  MRN: [id number]    Date of Service: 3/21/2021    Discharge Recommendations:  Home independently   PT Equipment Recommendations  Equipment Needed: No    Assessment   Body structures, Functions, Activity limitations: Decreased high-level IADLs  Assessment: DECLINE IN FUNCTIONAL ABILTIES  Treatment Diagnosis: GENERALIZED WEAKNESS  Prognosis: Excellent  Decision Making: Medium Complexity  PT Education: Transfer Training;General Safety;Gait Training  REQUIRES PT FOLLOW UP: Yes       Patient Diagnosis(es): The primary encounter diagnosis was Benign paroxysmal positional vertigo, unspecified laterality. A diagnosis of Nausea and vomiting, intractability of vomiting not specified, unspecified vomiting type was also pertinent to this visit. has a past medical history of Alveolar hypoventilation, Dermatitis herpetiformis, GERD (gastroesophageal reflux disease), Gout, Hearing loss, Hyperlipidemia, Hypertension, Hypothyroidism, Morbid obesity ([address] Multiple sclerosis ([address] Neuropathy, MINH (obstructive sleep apnea), PAD (peripheral artery disease) ([address] SVT (supraventricular tachycardia) ([address] and Type II or unspecified type diabetes mellitus without mention of complication, not stated as uncontrolled. has a past surgical history that includes Hysterectomy ();  section; Carpal tunnel release; Dilation and curettage of uterus; Cervical disc surgery; and Cardiac catheterization (Left, 2018).     Restrictions  Restrictions/Precautions  Restrictions/Precautions: Up as Tolerated  Required Braces or Orthoses?: No  Vision/Hearing  Vision: Within Functional Limits  Hearing: Within functional limits     Subjective  General  Chart Reviewed: Yes  Patient assessed for rehabilitation services?: Yes  Additional Pertinent Hx: DEHYDRATION  Family / Caregiver Present: No  Diagnosis: DEHYDRATION  Follows Commands: Within Functional Limits  Pain Screening  Patient Currently in Pain: No  Vital Signs  Patient Currently in Pain: No  Pre Treatment Pain Screening  Pain at present: 0    Orientation  Orientation  Overall Orientation Status: Within Normal Limits  Social/Functional History     Cognition   Cognition  Overall Cognitive Status: WFL    Objective          PROM RLE (degrees)  RLE PROM: WFL  AROM RLE (degrees)  RLE AROM: WFL  PROM LLE (degrees)  LLE PROM: WFL  AROM LLE (degrees)  LLE AROM : WFL  PROM RUE (degrees)  RUE PROM: WFL  AROM RUE (degrees)  RUE AROM : WFL  PROM LUE (degrees)  LUE PROM: WFL  AROM LUE (degrees)  LUE AROM : WFL  Strength RLE  Strength RLE: WFL  Strength LLE  Strength LLE: WFL  Strength RUE  Strength RUE: WFL  Strength LUE  Strength LUE: WFL        Bed mobility  Bridging: Independent  Rolling to Left: Independent  Rolling to Right: Independent  Supine to Sit: Independent  Sit to Supine: Independent  Scooting: Independent  Transfers  Sit to Stand: Independent  Stand to sit: Independent  Bed to Chair: Independent  Stand Pivot Transfers: Independent  Ambulation  Ambulation?: Yes  Ambulation 1  Surface: level tile  Device: No Device  Assistance: Independent;Supervision  Quality of Gait: STEP THRU GAIT. Distance: 150 FT. Stairs/Curb  Stairs?: No     Balance  Posture: Good  Sitting - Static: Good  Sitting - Dynamic: Good  Standing - Static: Good  Standing - Dynamic: Good        Plan   Plan  Times per day: Twice a day  Current Treatment Recommendations: Strengthening, Transfer Training, Neuromuscular Re-education, Patient/Caregiver Education & Training, Gait Training, Home Exercise Program  Safety Devices  Type of devices:  All fall risk precautions in place, Nurse notified, Call light within reach, Left in bed    G-Code       OutComes Score                                                  AM-PAC Score             Goals  Short term goals  Time Frame for Short term goals: 1 DAY  Short term goal 1: IND GAIT NO DEVICES 150 FT. Long term goals  Time Frame for Long term goals : 2 WEEKS  Long term goal 1: IND GAIT 300 FT NO DEVICES.   Patient Goals   Patient goals : RETURN HOME       Therapy Time   Individual Concurrent Group Co-treatment   Time In 0645         Time Out 5951         Minutes [facility], [address] [facility]

## 2021-03-21 NOTE — PROGRESS NOTES
Progress Note  Houston Stearns MD    OBJECTIVE:    Patient seen for f/u of Intractable vomiting with nausea. She is feeling better. No dizziness,nausea or vomiting     ROS:   Constitutional: negative  for fevers, and negative for chills. Respiratory: negative for shortness of breath, negative for cough, and negative for wheezing  Cardiovascular: negative for chest pain, and negative for palpitations  Gastrointestinal: negative for abdominal pain, negative for nausea,negative for vomiting, negative for diarrhea, and negative for constipation     All other systems were reviewed with the patient and are negative unless otherwise stated in HPI    OBJECTIVE:  Vitals:   Temp: 98.4 °F (36.9 °C)  BP: (!) 149/76  Resp: 16  Pulse: 77  SpO2: 98 %    24HR INTAKE/OUTPUT:      Intake/Output Summary (Last 24 hours) at 3/21/2021 0905  Last data filed at 3/21/2021 0841  Gross per 24 hour   Intake 2513.29 ml   Output --   Net 2513.29 ml     -----------------------------------------------------------------  Exam:  GEN:    Awake, alert and oriented x3. EYES:  EOMI, pupils equal   NECK: Supple. No lymphadenopathy. No carotid bruit  CVS:    regular rate and rhythm, no audible murmur  PULM:  CTA, no wheezes, rales or rhonchi, no acute respiratory distress  ABD:    Bowels sounds normal.  Abdomen is soft. No distention. no tenderness to palpation. EXT:   no edema bilaterally . No calf tenderness. NEURO: Moves all extremities. Motor and sensory are grossly intact  SKIN:  No rashes.   No skin lesions.    -----------------------------------------------------------------  Diagnostic Data:    · All available data reviewed  Lab Results   Component Value Date    WBC 5.7 03/21/2021    HGB 11.3 (L) 03/21/2021    MCV 94.2 03/21/2021     03/21/2021      Lab Results   Component Value Date    GLUCOSE 202 (H) 03/21/2021    BUN 18 03/21/2021    CREATININE 0.80 03/21/2021     03/21/2021    K 3.7 03/21/2021    CALCIUM 9.1 03/21/2021     03/21/2021    CO2 26 03/21/2021       PROBLEM LIST:  Principal Problem:    Intractable vomiting with nausea  Active Problems:    Multiple sclerosis (HCC)    Type 2 diabetes mellitus without complication, without long-term current use of insulin (HCC)    Coronary artery disease involving native coronary artery of native heart without angina pectoris    Dehydration    Intractable nausea and vomiting  Resolved Problems:    * No resolved hospital problems. *      ASSESSMENT / PLAN:  Intractable vomiting with nausea  · improved  · D/c home  ·   · Nutrition status:  Well developed, well nourished with no malnutrition  · DVT prophylaxis: Lovenox   · High risk medications: none   · Disposition:  Discharge plan is home    Milad Alvarenga M.D.  3/21/2021  9:05 AM

## 2021-03-21 NOTE — DISCHARGE SUMMARY
Physician Discharge Summary  Houston Stearns MD       Patient ID:  Frances Celestin  [id number]  1960    Admission date: 3/20/2021    Discharge date: 3/21/2021     Admitting Physician: Chencho Mittal MD     Primary Care Physician: Leon Zamudio DO     Primary Discharge Diagnoses:   Patient Active Problem List    Diagnosis Date Noted    Diabetes type 2, uncontrolled ([address] 08/08/2012     Priority: High    Intractable vomiting with nausea 03/20/2021    Type 2 diabetes mellitus without complication, without long-term current use of insulin ([address] 03/20/2021    Coronary artery disease involving native coronary artery of native heart without angina pectoris 03/20/2021    Dehydration 03/20/2021    Intractable nausea and vomiting 03/20/2021    Unstable angina ([address] 12/07/2018    Abnormal cardiovascular stress test 06/28/2018    ACS (acute coronary syndrome) ([address] 06/27/2018    Muscle spasm of left shoulder     Gait difficulty 04/24/2015    MS (multiple sclerosis) ([address] 11/10/2014    Cerebrovascular disease 12/17/2013    Memory loss 02/16/2013    Gout 11/08/2012    Spasticity 09/06/2012    Multiple sclerosis ([address] 09/06/2012    Dyslipidemia 08/08/2012    Hypothyroid 08/08/2012    HTN (hypertension) 08/08/2012    Obesity (BMI 30-39.9) 08/08/2012    Dermatitis herpetiformis     Multiple sclerosis, relapsing-remitting (HCC)     SVT (supraventricular tachycardia) ([address]     PAD (peripheral artery disease) ([address]     MINH (obstructive sleep apnea)        Additional Diagnoses:       Diagnosis Date    Alveolar hypoventilation     Dermatitis herpetiformis     GERD (gastroesophageal reflux disease)     Gout     Hearing loss     Hyperlipidemia     Hypertension     Hypothyroidism     Morbid obesity ([address]     Multiple sclerosis ([address]     Neuropathy     MINH (obstructive sleep apnea)     intolerant of cpap    PAD (peripheral artery disease) (HCC)     SVT (supraventricular tachycardia) ([address]     Type II or unspecified type diabetes mellitus without mention of complication, not stated as uncontrolled          Review of Systems:  Constitutional: negative for fevers or chills  Eyes: negative for visual disturbance   ENT: negative for sore throat or nasal congestion  Respiratory: negative for shortness of breath or cough  Cardiovascular: negative for chest pain ,palpitations,pnd,syncope  Gastrointestinal: negative for abd pain, nausea, vomiting, diarrhea , constipation,hemetemesis,joselyn,blood in stool  Genitourinary: negative for dysuria, urgency ,frequency,hematuria  Integument/breast: negative for skin rash or lesions  Neurological: negative for unilateral weakness, numbness or tingling. Skeletal Muscular: no joint pain,jont swelling,back pain              Physical exam:      -----------------------------------------------------------------  Exam:  GEN:   A & O x3, no apparent distress  EYES: No gross abnormalities. NECK: normal, supple, no lymphadenopathy,  no carotid bruits  PULM: clear to auscultation bilaterally- no wheezes, rales or rhonchi, normal air movement, no respiratory distress  COR: regular rate & rhythm, no murmurs and no gallops  ABD:  soft, non-tender, non-distended, normal bowel sounds, no masses or organomegaly  EXT:   no cyanosis, clubbing or edema present    NEURO: negative  SKIN:  no rashes or significant lesions  -----------------------------------------------------------------          Hospital Course: The patient was admitted for the above. She was treated with iv fluids,zofran and improved over the course of her hospitalization.       Consultants:    · none    Procedures:    · none    Complications:   · none    Significant Diagnostic Studies:   Ct Head Wo Contrast    Result Date: 3/20/2021  EXAMINATION: CT OF THE HEAD WITHOUT CONTRAST  3/20/2021 12:26 pm TECHNIQUE: CT of the head was performed without the administration of intravenous contrast. Dose modulation, iterative reconstruction, and/or weight based adjustment of the mA/kV was utilized to reduce the radiation dose to as low as reasonably achievable. COMPARISON: 07/11/2018 HISTORY: ORDERING SYSTEM PROVIDED HISTORY: hypertension, vomiting, dizziness TECHNOLOGIST PROVIDED HISTORY: hypertension, vomiting, dizziness Decision Support Exception->Emergency Medical Condition (MA) FINDINGS: BRAIN/VENTRICLES: There is no acute intracranial hemorrhage, mass effect or midline shift. No abnormal extra-axial fluid collection. The gray-white differentiation is maintained without evidence of an acute infarct. There is no evidence of hydrocephalus. ORBITS: The visualized portion of the orbits demonstrate no acute abnormality. SINUSES: The visualized paranasal sinuses and mastoid air cells demonstrate no acute abnormality. SOFT TISSUES/SKULL:  No acute abnormality of the visualized skull or soft tissues. Stable calcified multiple subcutaneous scalp nodules. No acute intracranial abnormality. Xr Chest Portable    Result Date: 3/20/2021  EXAMINATION: ONE XRAY VIEW OF THE CHEST 3/20/2021 12:26 pm COMPARISON: 12/06/2018 HISTORY: ORDERING SYSTEM PROVIDED HISTORY: dizziness, vomiting TECHNOLOGIST PROVIDED HISTORY: dizziness, vomiting FINDINGS: The cardiomediastinal silhouette is within normal limits. There is no consolidation, pneumothorax or evidence for edema. No evidence for effusion. No acute osseous abnormality is identified. No acute airspace disease identified. Ct Chest Pulmonary Embolism W Contrast    Result Date: 3/20/2021  EXAMINATION: CTA OF THE CHEST 3/20/2021 2:19 pm TECHNIQUE: CTA of the chest was performed after the administration of intravenous contrast.  Multiplanar reformatted images are provided for review. MIP images are provided for review. Dose modulation, iterative reconstruction, and/or weight based adjustment of the mA/kV was utilized to reduce the radiation dose to as low as reasonably achievable. COMPARISON: Chest radiograph today. Chest CT 06/22/2018. CT abdomen and pelvis 08/13/2014 HISTORY: ORDERING SYSTEM PROVIDED HISTORY: dizziness, hypertension, chest pain, elevated d dimer TECHNOLOGIST PROVIDED HISTORY: dizziness, hypertension, chest pain, elevated d dimer Decision Support Exception->Emergency Medical Condition (MA) FINDINGS: Pulmonary Arteries: Pulmonary arteries are adequately opacified for evaluation. No evidence of intraluminal filling defect to suggest pulmonary embolism. Main pulmonary artery is normal in caliber. Mediastinum: No evidence of mediastinal lymphadenopathy. The heart and pericardium demonstrate no acute abnormality. There is no acute abnormality of the thoracic aorta. Lungs/pleura: The lungs are without acute process. No focal consolidation or pulmonary edema. No evidence of pleural effusion or pneumothorax. Upper Abdomen: The partially visualized gallbladder appears distended. Indeterminate 1.5 cm left adrenal nodule, although stable since prior 2014 abdominal exam, consistent with a benign process. Soft Tissues/Bones: No acute bone or soft tissue abnormality. Status post anterior cervical fusion. 1.  No evidence of pulmonary embolism or acute pulmonary abnormality. 2.  Stable left adrenal nodule since at least 2014, consistent with a benign process. 3.  Partially visualized distended gallbladder. Cta Head Neck W Contrast    Result Date: 3/20/2021  EXAMINATION: CTA OF THE HEAD AND NECK WITH CONTRAST 3/20/2021 1:21 pm: TECHNIQUE: CTA of the head and neck was performed with the administration of intravenous contrast. Multiplanar reformatted images are provided for review. MIP images are provided for review. Stenosis of the internal carotid arteries measured using NASCET criteria. Dose modulation, iterative reconstruction, and/or weight based adjustment of the mA/kV was utilized to reduce the radiation dose to as low as reasonably achievable. COMPARISON: None. HISTORY: ORDERING SYSTEM PROVIDED HISTORY: dizziness, hypertension TECHNOLOGIST PROVIDED HISTORY: dizziness, hypertension Decision Support Exception->Emergency Medical Condition (MA) FINDINGS: CTA NECK: AORTIC ARCH/ARCH VESSELS: No dissection or arterial injury. No significant stenosis of the brachiocephalic or subclavian arteries. CAROTID ARTERIES: No dissection, arterial injury, or hemodynamically significant stenosis by NASCET criteria. VERTEBRAL ARTERIES: No dissection, arterial injury, or significant stenosis. SOFT TISSUES: The lung apices are clear. No cervical or superior mediastinal lymphadenopathy. The larynx and pharynx are unremarkable. No acute abnormality of the salivary and thyroid glands. BONES: Degenerative and operative changes of the spine are present. CTA HEAD: ANTERIOR CIRCULATION: No significant stenosis of the intracranial internal carotid, anterior cerebral, or middle cerebral arteries. No aneurysm. A left-sided posterior communicating artery is present. POSTERIOR CIRCULATION: No significant stenosis of the vertebral, basilar, or posterior cerebral arteries. No aneurysm. OTHER: No dural venous sinus thrombosis on this non-dedicated study. BRAIN: See separately dictated noncontrast head CT report. No flow limiting stenosis or large vessel occlusion visualized within the head or neck.      Recent Results (from the past 96 hour(s))   EKG 12 Lead    Collection Time: 03/20/21 11:45 AM   Result Value Ref Range    Ventricular Rate 83 BPM    Atrial Rate 83 BPM    P-R Interval 140 ms    QRS Duration 92 ms    Q-T Interval 370 ms    QTc Calculation (Bazett) 434 ms    P Axis 50 degrees    R Axis 31 degrees    T Axis 39 degrees   CBC Auto Differential    Collection Time: 03/20/21 11:55 AM   Result Value Ref Range    WBC 8.7 3.5 - 11.3 k/uL    RBC 4.52 3.95 - 5.11 m/uL    Hemoglobin 13.5 11.9 - 15.1 g/dL    Hematocrit 42.9 36.3 - 47.1 %    MCV 94.9 82.6 - 102.9 fL    MCH 29.9 25.2 - 33.5 pg    MCHC 31.5 28.4 - 34.8 g/dL    RDW 12.8 11.8 - 14.4 %    Platelets 067 154 - 460 k/uL    MPV 12.1 8.1 - 13.5 fL    NRBC Automated 0.2 (H) 0.0 per 100 WBC    Differential Type NOT REPORTED     Seg Neutrophils 87 (H) 36 - 65 %    Lymphocytes 8 (L) 24 - 43 %    Monocytes 4 3 - 12 %    Eosinophils % 0 (L) 1 - 4 %    Basophils 1 0 - 2 %    Immature Granulocytes 0 0 %    Segs Absolute 7.58 1.50 - 8.10 k/uL    Absolute Lymph # 0.72 (L) 1.10 - 3.70 k/uL    Absolute Mono # 0.33 0.10 - 1.20 k/uL    Absolute Eos # 0.03 0.00 - 0.44 k/uL    Basophils Absolute 0.05 0.00 - 0.20 k/uL    Absolute Immature Granulocyte 0.03 0.00 - 0.30 k/uL    WBC Morphology NOT REPORTED     RBC Morphology NOT REPORTED     Platelet Estimate NOT REPORTED    Comprehensive Metabolic Panel    Collection Time: 03/20/21 11:55 AM   Result Value Ref Range    Glucose 284 (H) 70 - 99 mg/dL    BUN 30 (H) 6 - 20 mg/dL    CREATININE 0.95 (H) 0.50 - 0.90 mg/dL    Bun/Cre Ratio 32 (H) 9 - 20    Calcium 10.1 8.6 - 10.4 mg/dL    Sodium 136 135 - 144 mmol/L    Potassium 3.9 3.7 - 5.3 mmol/L    Chloride 100 98 - 107 mmol/L    CO2 24 20 - 31 mmol/L    Anion Gap 12 9 - 17 mmol/L    Alkaline Phosphatase 99 35 - 104 U/L    ALT 12 5 - 33 U/L    AST 13 <32 U/L    Total Bilirubin 0.27 (L) 0.3 - 1.2 mg/dL    Total Protein 8.3 6.4 - 8.3 g/dL    Albumin 4.5 3.5 - 5.2 g/dL    Albumin/Globulin Ratio 1.2 1.0 - 2.5    GFR Non-African American >60 >60 mL/min    GFR African American >60 >60 mL/min    GFR Comment          GFR Staging         Troponin    Collection Time: 03/20/21 11:55 AM   Result Value Ref Range    Troponin, High Sensitivity <6 0 - 14 ng/L    Troponin T NOT REPORTED <0.03 ng/mL    Troponin Interp NOT REPORTED    Brain Natriuretic Peptide    Collection Time: 03/20/21 11:55 AM   Result Value Ref Range    Pro- <300 pg/mL    BNP Interpretation Pro-BNP Reference Range:    D-dimer, quantitative    Collection Time: 03/20/21 11:55 AM   Result Value Ref Range    D-Dimer, Jorge Agrawal 0.63 (H) 0.00 - 0.59 mg/L FEU   Troponin    Collection Time: 03/20/21  2:26 PM   Result Value Ref Range    Troponin, High Sensitivity 6 0 - 14 ng/L    Troponin T NOT REPORTED <0.03 ng/mL    Troponin Interp NOT REPORTED    Glucose, Whole Blood    Collection Time: 03/20/21  2:56 PM   Result Value Ref Range    POC Glucose 260 (H) 74 - 100 mg/dL   Glucose, Whole Blood    Collection Time: 03/20/21  5:17 PM   Result Value Ref Range    POC Glucose 239 (H) 74 - 100 mg/dL   Glucose, Whole Blood    Collection Time: 03/20/21  8:07 PM   Result Value Ref Range    POC Glucose 213 (H) 74 - 100 mg/dL   CBC    Collection Time: 03/21/21  6:00 AM   Result Value Ref Range    WBC 5.7 3.5 - 11.3 k/uL    RBC 3.78 (L) 3.95 - 5.11 m/uL    Hemoglobin 11.3 (L) 11.9 - 15.1 g/dL    Hematocrit 35.6 (L) 36.3 - 47.1 %    MCV 94.2 82.6 - 102.9 fL    MCH 29.9 25.2 - 33.5 pg    MCHC 31.7 28.4 - 34.8 g/dL    RDW 12.9 11.8 - 14.4 %    Platelets 604 450 - 538 k/uL    MPV 11.9 8.1 - 13.5 fL    NRBC Automated 0.0 0.0 per 100 WBC   Basic Metabolic Panel w/ Reflex to MG    Collection Time: 03/21/21  6:00 AM   Result Value Ref Range    Glucose 202 (H) 70 - 99 mg/dL    BUN 18 6 - 20 mg/dL    CREATININE 0.80 0.50 - 0.90 mg/dL    Bun/Cre Ratio 23 (H) 9 - 20    Calcium 9.1 8.6 - 10.4 mg/dL    Sodium 139 135 - 144 mmol/L    Potassium 3.7 3.7 - 5.3 mmol/L    Chloride 105 98 - 107 mmol/L    CO2 26 20 - 31 mmol/L    Anion Gap 8 (L) 9 - 17 mmol/L    GFR Non-African American >60 >60 mL/min    GFR African American >60 >60 mL/min    GFR Comment          GFR Staging         Glucose, Whole Blood    Collection Time: 03/21/21  6:51 AM   Result Value Ref Range    POC Glucose 184 (H) 74 - 100 mg/dL     ·     Discharge Condition:   · stable    Disposition:   · home    Discharge Medications:     Ariadna Plate   Home Medication Instructions NHR:[id number]    Printed on:03/21/21 0910   Medication Information                      aspirin 81 MG EC tablet  Take 1 tablet by mouth daily             atorvastatin (LIPITOR) 40 MG tablet  Take 1 tablet by mouth nightly             baclofen (LIORESAL) 20 MG tablet  Take 1 tablet by mouth 4 times daily             clopidogrel (PLAVIX) 75 MG tablet  Take 1 tablet by mouth daily             furosemide (LASIX) 20 MG tablet  Take 20 mg by mouth daily             levothyroxine (SYNTHROID) 200 MCG tablet  Take 1 tablet by mouth Daily             Magnesium 250 MG TABS  Take 500 mg by mouth every morning              metformin (GLUCOPHAGE) 1000 MG tablet  Take 1 tablet by mouth daily (with breakfast). metoprolol tartrate (LOPRESSOR) 25 MG tablet  Take 1 tablet by mouth 2 times daily             nitroGLYCERIN (NITROSTAT) 0.4 MG SL tablet  up to max of 3 total doses. If no relief after 1 dose, call 911. NONFORMULARY  Take 1 tablet by mouth daily OTC Potassium supplement             ondansetron (ZOFRAN ODT) 4 MG disintegrating tablet  Take 1 tablet by mouth every 8 hours as needed for Nausea or Vomiting             vitamin D-3 (CHOLECALCIFEROL) 5000 units TABS  Take 5,000 Units by mouth 3 times daily                 · Resume all home medications unless otherwise directed  · Add zofran prn  · Stop taking     Patient Instructions:   · Activity: activity as tolerated  · Diet: cardiac diet  · Wound Care: none needed  · Other:   · Follow up with Dr Lucas Acuna in 2 wks as directed      Time Spent on discharge services is 25 minutes in the examination, evaluation, counseling and review of medications and discharge plan.     Signed:  Chencho Mittal M.D.  3/21/2021  9:10 AM

## 2021-03-21 NOTE — PROGRESS NOTES
FSBS 213. Pt refusing Humalog at this time. States, \"I do not take insulin at home and it is only elevated d/t steroid given in ER. \" Education given but patient continues to decline. Also, refusing Lipitor at this time. States, \"I no longer take that medicine. \"

## 2021-03-21 NOTE — PROGRESS NOTES
Patient denies any dizziness or nausea this am. Ambulates in [place] without difficulty. Sitting edge of bed at this time. Denies needs or c/o's. Call light in reach.

## 2021-03-21 NOTE — PROGRESS NOTES
Spoke with Dr. Audelia Pickard via telephone to order pt's home medication, Baclofen 20mg QID, per patient request.

## 2021-03-23 LAB
ESTIMATED AVERAGE GLUCOSE: 189 MG/DL
HBA1C MFR BLD: 8.2 % (ref 4–6)

## 2021-03-30 ENCOUNTER — HOSPITAL ENCOUNTER (EMERGENCY)
Age: 61
Discharge: HOME | End: 2021-03-30
Attending: FAMILY MEDICINE
Payer: MEDICARE

## 2021-03-30 VITALS
OXYGEN SATURATION: 96 % | SYSTOLIC BLOOD PRESSURE: 155 MMHG | HEART RATE: 73 BPM | RESPIRATION RATE: 14 BRPM | DIASTOLIC BLOOD PRESSURE: 60 MMHG

## 2021-03-30 DIAGNOSIS — G35 HISTORY OF MULTIPLE SCLEROSIS (HCC): ICD-10-CM

## 2021-03-30 DIAGNOSIS — R42 DIZZINESS: Primary | ICD-10-CM

## 2021-03-30 LAB
ABSOLUTE EOS #: 0.06 K/UL (ref 0–0.44)
ABSOLUTE IMMATURE GRANULOCYTE: 0 K/UL (ref 0–0.3)
ABSOLUTE LYMPH #: 0.25 K/UL (ref 1.1–3.7)
ABSOLUTE MONO #: 0.06 K/UL (ref 0.1–1.2)
ALBUMIN SERPL-MCNC: 4.2 G/DL (ref 3.5–5.2)
ALBUMIN/GLOBULIN RATIO: 1.4 (ref 1–2.5)
ALP BLD-CCNC: 86 U/L (ref 35–104)
ALT SERPL-CCNC: 11 U/L (ref 5–33)
ANION GAP SERPL CALCULATED.3IONS-SCNC: 11 MMOL/L (ref 9–17)
AST SERPL-CCNC: 14 U/L
BASOPHILS # BLD: 0 % (ref 0–2)
BASOPHILS ABSOLUTE: 0 K/UL (ref 0–0.2)
BILIRUB SERPL-MCNC: 0.24 MG/DL (ref 0.3–1.2)
BUN BLDV-MCNC: 27 MG/DL (ref 6–20)
BUN/CREAT BLD: 34 (ref 9–20)
CALCIUM SERPL-MCNC: 9.7 MG/DL (ref 8.6–10.4)
CHLORIDE BLD-SCNC: 96 MMOL/L (ref 98–107)
CO2: 24 MMOL/L (ref 20–31)
CREAT SERPL-MCNC: 0.8 MG/DL (ref 0.5–0.9)
DIFFERENTIAL TYPE: ABNORMAL
EOSINOPHILS RELATIVE PERCENT: 1 % (ref 1–4)
GFR AFRICAN AMERICAN: >60 ML/MIN
GFR NON-AFRICAN AMERICAN: >60 ML/MIN
GFR SERPL CREATININE-BSD FRML MDRD: ABNORMAL ML/MIN/{1.73_M2}
GFR SERPL CREATININE-BSD FRML MDRD: ABNORMAL ML/MIN/{1.73_M2}
GLUCOSE BLD-MCNC: 272 MG/DL (ref 70–99)
HCT VFR BLD CALC: 38.7 % (ref 36.3–47.1)
HEMOGLOBIN: 12.5 G/DL (ref 11.9–15.1)
IMMATURE GRANULOCYTES: 0 %
LYMPHOCYTES # BLD: 4 % (ref 24–43)
MCH RBC QN AUTO: 29.6 PG (ref 25.2–33.5)
MCHC RBC AUTO-ENTMCNC: 32.3 G/DL (ref 28.4–34.8)
MCV RBC AUTO: 91.7 FL (ref 82.6–102.9)
MONOCYTES # BLD: 1 % (ref 3–12)
MORPHOLOGY: ABNORMAL
NRBC AUTOMATED: 0 PER 100 WBC
PDW BLD-RTO: 13 % (ref 11.8–14.4)
PLATELET # BLD: 220 K/UL (ref 138–453)
PLATELET ESTIMATE: ABNORMAL
PMV BLD AUTO: 12 FL (ref 8.1–13.5)
POTASSIUM SERPL-SCNC: 4.1 MMOL/L (ref 3.7–5.3)
RBC # BLD: 4.22 M/UL (ref 3.95–5.11)
RBC # BLD: ABNORMAL 10*6/UL
SEG NEUTROPHILS: 94 % (ref 36–65)
SEGMENTED NEUTROPHILS ABSOLUTE COUNT: 5.83 K/UL (ref 1.5–8.1)
SODIUM BLD-SCNC: 131 MMOL/L (ref 135–144)
TOTAL PROTEIN: 7.3 G/DL (ref 6.4–8.3)
TROPONIN INTERP: NORMAL
TROPONIN T: NORMAL NG/ML
TROPONIN, HIGH SENSITIVITY: 8 NG/L (ref 0–14)
WBC # BLD: 6.2 K/UL (ref 3.5–11.3)
WBC # BLD: ABNORMAL 10*3/UL

## 2021-03-30 PROCEDURE — 85025 COMPLETE CBC W/AUTO DIFF WBC: CPT

## 2021-03-30 PROCEDURE — 6370000000 HC RX 637 (ALT 250 FOR IP): Performed by: FAMILY MEDICINE

## 2021-03-30 PROCEDURE — 2580000003 HC RX 258: Performed by: EMERGENCY MEDICINE

## 2021-03-30 PROCEDURE — 80053 COMPREHEN METABOLIC PANEL: CPT

## 2021-03-30 PROCEDURE — 84484 ASSAY OF TROPONIN QUANT: CPT

## 2021-03-30 PROCEDURE — 96374 THER/PROPH/DIAG INJ IV PUSH: CPT

## 2021-03-30 PROCEDURE — 36415 COLL VENOUS BLD VENIPUNCTURE: CPT

## 2021-03-30 PROCEDURE — 6370000000 HC RX 637 (ALT 250 FOR IP): Performed by: EMERGENCY MEDICINE

## 2021-03-30 PROCEDURE — 6360000002 HC RX W HCPCS: Performed by: FAMILY MEDICINE

## 2021-03-30 PROCEDURE — 99284 EMERGENCY DEPT VISIT MOD MDM: CPT

## 2021-03-30 RX ORDER — MECLIZINE HCL 12.5 MG/1
25 TABLET ORAL ONCE
Status: COMPLETED | OUTPATIENT
Start: 2021-03-30 | End: 2021-03-30

## 2021-03-30 RX ORDER — 0.9 % SODIUM CHLORIDE 0.9 %
1000 INTRAVENOUS SOLUTION INTRAVENOUS ONCE
Status: COMPLETED | OUTPATIENT
Start: 2021-03-30 | End: 2021-03-30

## 2021-03-30 RX ORDER — MECLIZINE HYDROCHLORIDE 25 MG/1
25 TABLET ORAL 3 TIMES DAILY PRN
Qty: 15 TABLET | Refills: 0 | Status: SHIPPED | OUTPATIENT
Start: 2021-03-30 | End: 2021-04-09

## 2021-03-30 RX ORDER — ONDANSETRON 4 MG/1
4 TABLET, ORALLY DISINTEGRATING ORAL EVERY 8 HOURS PRN
Qty: 20 TABLET | Refills: 0 | Status: SHIPPED | OUTPATIENT
Start: 2021-03-30

## 2021-03-30 RX ORDER — DIAZEPAM 2 MG/1
2 TABLET ORAL ONCE
Status: COMPLETED | OUTPATIENT
Start: 2021-03-30 | End: 2021-03-30

## 2021-03-30 RX ORDER — DEXAMETHASONE SODIUM PHOSPHATE 10 MG/ML
10 INJECTION INTRAMUSCULAR; INTRAVENOUS ONCE
Status: COMPLETED | OUTPATIENT
Start: 2021-03-30 | End: 2021-03-30

## 2021-03-30 RX ORDER — PREDNISONE 20 MG/1
20 TABLET ORAL DAILY
Qty: 5 TABLET | Refills: 0 | Status: SHIPPED | OUTPATIENT
Start: 2021-03-30 | End: 2021-04-04

## 2021-03-30 RX ADMIN — DEXAMETHASONE SODIUM PHOSPHATE 10 MG: 10 INJECTION INTRAMUSCULAR; INTRAVENOUS at 18:11

## 2021-03-30 RX ADMIN — SODIUM CHLORIDE 1000 ML: 9 INJECTION, SOLUTION INTRAVENOUS at 19:26

## 2021-03-30 RX ADMIN — MECLIZINE 25 MG: 12.5 TABLET ORAL at 19:27

## 2021-03-30 RX ADMIN — DIAZEPAM 2 MG: 2 TABLET ORAL at 17:48

## 2021-03-30 ASSESSMENT — ENCOUNTER SYMPTOMS
SINUS PRESSURE: 0
EYES NEGATIVE: 1
SORE THROAT: 0
GASTROINTESTINAL NEGATIVE: 1
FACIAL SWELLING: 0
TROUBLE SWALLOWING: 0
SINUS PAIN: 0
RHINORRHEA: 0
RESPIRATORY NEGATIVE: 1
VOICE CHANGE: 0

## 2021-03-30 NOTE — ED PROVIDER NOTES
[facility] ED  Emergency Department  Emergency Medicine Sign-out     Care of Srini Griffith was assumed from Dr. Elisa Orellana and is being seen for Dizziness (Onset 1530 while sitting in chair. Pt states she has had ringing in ears  and was treated in ED 3 days ago for dizziness, but had improved. Diagnosed with Vertigo)  . The patient's initial evaluation and plan have been discussed with the prior provider who initially evaluated the patient.      EMERGENCY DEPARTMENT COURSE / MEDICAL DECISION MAKING:       MEDICATIONS GIVEN:  Orders Placed This Encounter   Medications    diazePAM (VALIUM) tablet 2 mg    dexamethasone (DECADRON) injection 10 mg    0.9 % sodium chloride bolus    meclizine (ANTIVERT) tablet 25 mg    ondansetron (ZOFRAN ODT) 4 MG disintegrating tablet     Sig: Take 1 tablet by mouth every 8 hours as needed for Nausea     Dispense:  20 tablet     Refill:  0    meclizine (ANTIVERT) 25 MG tablet     Sig: Take 1 tablet by mouth 3 times daily as needed for Dizziness or Nausea     Dispense:  15 tablet     Refill:  0    predniSONE (DELTASONE) 20 MG tablet     Sig: Take 1 tablet by mouth daily for 5 days     Dispense:  5 tablet     Refill:  0       LABS / RADIOLOGY:     Labs Reviewed   CBC WITH AUTO DIFFERENTIAL - Abnormal; Notable for the following components:       Result Value    Seg Neutrophils 94 (*)     Lymphocytes 4 (*)     Monocytes 1 (*)     Absolute Lymph # 0.25 (*)     Absolute Mono # 0.06 (*)     All other components within normal limits   COMPREHENSIVE METABOLIC PANEL - Abnormal; Notable for the following components:    Glucose 272 (*)     BUN 27 (*)     Bun/Cre Ratio 34 (*)     Sodium 131 (*)     Chloride 96 (*)     Total Bilirubin 0.24 (*)     All other components within normal limits   TROPONIN       Ct Head Wo Contrast    Result Date: 3/20/2021  EXAMINATION: CT OF THE HEAD WITHOUT CONTRAST  3/20/2021 12:26 pm TECHNIQUE: CT of the head was performed without the administration of intravenous contrast. Dose modulation, iterative reconstruction, and/or weight based adjustment of the mA/kV was utilized to reduce the radiation dose to as low as reasonably achievable. COMPARISON: 07/11/2018 HISTORY: ORDERING SYSTEM PROVIDED HISTORY: hypertension, vomiting, dizziness TECHNOLOGIST PROVIDED HISTORY: hypertension, vomiting, dizziness Decision Support Exception->Emergency Medical Condition (MA) FINDINGS: BRAIN/VENTRICLES: There is no acute intracranial hemorrhage, mass effect or midline shift. No abnormal extra-axial fluid collection. The gray-white differentiation is maintained without evidence of an acute infarct. There is no evidence of hydrocephalus. ORBITS: The visualized portion of the orbits demonstrate no acute abnormality. SINUSES: The visualized paranasal sinuses and mastoid air cells demonstrate no acute abnormality. SOFT TISSUES/SKULL:  No acute abnormality of the visualized skull or soft tissues. Stable calcified multiple subcutaneous scalp nodules. No acute intracranial abnormality. Xr Chest Portable    Result Date: 3/20/2021  EXAMINATION: ONE XRAY VIEW OF THE CHEST 3/20/2021 12:26 pm COMPARISON: 12/06/2018 HISTORY: ORDERING SYSTEM PROVIDED HISTORY: dizziness, vomiting TECHNOLOGIST PROVIDED HISTORY: dizziness, vomiting FINDINGS: The cardiomediastinal silhouette is within normal limits. There is no consolidation, pneumothorax or evidence for edema. No evidence for effusion. No acute osseous abnormality is identified. No acute airspace disease identified. Ct Chest Pulmonary Embolism W Contrast    Result Date: 3/20/2021  EXAMINATION: CTA OF THE CHEST 3/20/2021 2:19 pm TECHNIQUE: CTA of the chest was performed after the administration of intravenous contrast.  Multiplanar reformatted images are provided for review. MIP images are provided for review.  Dose modulation, iterative reconstruction, and/or weight based adjustment of the mA/kV was utilized to reduce the radiation dose to as low as reasonably achievable. COMPARISON: Chest radiograph today. Chest CT 06/22/2018. CT abdomen and pelvis 08/13/2014 HISTORY: ORDERING SYSTEM PROVIDED HISTORY: dizziness, hypertension, chest pain, elevated d dimer TECHNOLOGIST PROVIDED HISTORY: dizziness, hypertension, chest pain, elevated d dimer Decision Support Exception->Emergency Medical Condition (MA) FINDINGS: Pulmonary Arteries: Pulmonary arteries are adequately opacified for evaluation. No evidence of intraluminal filling defect to suggest pulmonary embolism. Main pulmonary artery is normal in caliber. Mediastinum: No evidence of mediastinal lymphadenopathy. The heart and pericardium demonstrate no acute abnormality. There is no acute abnormality of the thoracic aorta. Lungs/pleura: The lungs are without acute process. No focal consolidation or pulmonary edema. No evidence of pleural effusion or pneumothorax. Upper Abdomen: The partially visualized gallbladder appears distended. Indeterminate 1.5 cm left adrenal nodule, although stable since prior 2014 abdominal exam, consistent with a benign process. Soft Tissues/Bones: No acute bone or soft tissue abnormality. Status post anterior cervical fusion. 1.  No evidence of pulmonary embolism or acute pulmonary abnormality. 2.  Stable left adrenal nodule since at least 2014, consistent with a benign process. 3.  Partially visualized distended gallbladder. Cta Head Neck W Contrast    Result Date: 3/20/2021  EXAMINATION: CTA OF THE HEAD AND NECK WITH CONTRAST 3/20/2021 1:21 pm: TECHNIQUE: CTA of the head and neck was performed with the administration of intravenous contrast. Multiplanar reformatted images are provided for review. MIP images are provided for review. Stenosis of the internal carotid arteries measured using NASCET criteria.  Dose modulation, iterative reconstruction, and/or weight based adjustment of the mA/kV was utilized to reduce the radiation dose to as low as reasonably achievable. COMPARISON: None. HISTORY: ORDERING SYSTEM PROVIDED HISTORY: dizziness, hypertension TECHNOLOGIST PROVIDED HISTORY: dizziness, hypertension Decision Support Exception->Emergency Medical Condition (MA) FINDINGS: CTA NECK: AORTIC ARCH/ARCH VESSELS: No dissection or arterial injury. No significant stenosis of the brachiocephalic or subclavian arteries. CAROTID ARTERIES: No dissection, arterial injury, or hemodynamically significant stenosis by NASCET criteria. VERTEBRAL ARTERIES: No dissection, arterial injury, or significant stenosis. SOFT TISSUES: The lung apices are clear. No cervical or superior mediastinal lymphadenopathy. The larynx and pharynx are unremarkable. No acute abnormality of the salivary and thyroid glands. BONES: Degenerative and operative changes of the spine are present. CTA HEAD: ANTERIOR CIRCULATION: No significant stenosis of the intracranial internal carotid, anterior cerebral, or middle cerebral arteries. No aneurysm. A left-sided posterior communicating artery is present. POSTERIOR CIRCULATION: No significant stenosis of the vertebral, basilar, or posterior cerebral arteries. No aneurysm. OTHER: No dural venous sinus thrombosis on this non-dedicated study. BRAIN: See separately dictated noncontrast head CT report. No flow limiting stenosis or large vessel occlusion visualized within the head or neck. RECENT VITALS:      ,  Pulse: 73, Resp: 14, BP: (!) 155/60, SpO2: 96 %    This patient is a 63 y.o. Female with vertigo-like symptoms ongoing for at least 10 days. Patient recently had a CTA head and neck as well as a CT head 10 days ago which were both unremarkable and was admitted to the hospital at that time. Continues to have these symptoms, does not want any repeat imaging as the symptoms are not new however would like something for her symptoms.   No chest pain shortness breath difficulty breathing, blood work was pending at the time of

## 2021-03-30 NOTE — ED PROVIDER NOTES
[facility] ED  EMERGENCY DEPARTMENT ENCOUNTER      Pt Name: Jayro Guallpa  MRN: [id number]  [place] 1960  Date of evaluation: 3/30/2021  Provider: Pia Medrano MD    CHIEF COMPLAINT     Chief Complaint   Patient presents with    Dizziness     Onset 1530 while sitting in chair. Pt states she has had ringing in ears  and was treated in ED 3 days ago for dizziness, but had improved. Diagnosed with Vertigo         HISTORY OF PRESENT ILLNESS   (Location/Symptom, Timing/Onset, Context/Setting,Quality, Duration, Modifying Factors, Severity)  Note limiting factors. Jayro Guallpa is a66 y.o. female who presents to the emergency department      This is a 63years old patient with a history of MS that presents to our ER due to recurrence of her dizziness. She parents are having some dizziness on the 20th of this month and subsequently was admitted to the hospital for further evaluation and treatment. States that she improved with some steroids and went home did okay until today when the dizziness started again. She reports her dizziness to be spinning dizziness/vertigo. She denies any headache, blurry vision, or any other neurological symptoms. She also reports that she thinks that this has nothing to do with her MS she thinks that this has to do with her a year since she started having some ringing in her ear/she had it for a while however as of lately is gotten worse. Nursing Notes werereviewed. REVIEW OF SYSTEMS    (2-9 systems for level 4, 10 or more for level 5)     Review of Systems   Constitutional: Negative. HENT: Positive for tinnitus. Negative for congestion, dental problem, drooling, ear discharge, ear pain, facial swelling, hearing loss, mouth sores, nosebleeds, postnasal drip, rhinorrhea, sinus pressure, sinus pain, sneezing, sore throat, trouble swallowing and voice change. Eyes: Negative. Respiratory: Negative. Cardiovascular: Negative. Gastrointestinal: Negative. Endocrine: Negative. Genitourinary: Negative. Musculoskeletal: Negative. Neurological: Positive for dizziness. Negative for tremors, seizures, syncope, facial asymmetry, speech difficulty, weakness, light-headedness, numbness and headaches. Hematological: Negative. Except as noted above the remainder of the review of systems was reviewed and negative.        PAST MEDICAL HISTORY     Past Medical History:   Diagnosis Date    Alveolar hypoventilation     Dermatitis herpetiformis     GERD (gastroesophageal reflux disease)     Gout     Hearing loss     Hyperlipidemia     Hypertension     Hypothyroidism     Morbid obesity ([facility] [address]     Multiple sclerosis (HCC)     Neuropathy     MINH (obstructive sleep apnea)     intolerant of cpap    PAD (peripheral artery disease) (HCC)     SV[facility]ricular tachycardia) ([facility])     Type II or unspecified type diabetes mellitus without mention of complication, not stated as uncontrolled     Vertigo          SURGICALHISTORY       Past Surgical History:   Procedure Laterality Date    [facility]    CARPAL TUNNEL RELEASE      left    CERVICAL DISC SURGERY       SECTION          DILATION AND CURETTAGE OF UTERUS      HYSTERECTOMY  2006    cervical dysplasia         CURRENT MEDICATIONS       Previous Medications    ASPIRIN 81 MG EC TABLET    Take 1 tablet by mouth daily    ATORVASTATIN (LIPITOR) 40 MG TABLET    Take 1 tablet by mouth nightly    BACLOFEN (LIORESAL) 20 MG TABLET    Take 1 tablet by mouth 4 times daily    CLOPIDOGREL (PLAVIX) 75 MG TABLET    Take 1 tablet by mouth daily    FUROSEMIDE (LASIX) 20 MG TABLET    Take 20 mg by mouth daily    LEVOTHYROXINE (SYNTHROID) 200 MCG TABLET    Take 1 tablet by mouth Daily    MAGNESIUM 250 MG TABS    Take 500 mg by mouth every morning     METFORMIN (GLUCOPHAGE) 1000 MG TABLET    Take 1 tablet by mouth daily (with breakfast). METOPROLOL TARTRATE (LOPRESSOR) 25 MG TABLET    Take 1 tablet by mouth 2 times daily    NITROGLYCERIN (NITROSTAT) 0.4 MG SL TABLET    up to max of 3 total doses. If no relief after 1 dose, call 911.     NONFORMULARY    Take 1 tablet by mouth daily OTC Potassium supplement    ONDANSETRON (ZOFRAN ODT) 4 MG DISINTEGRATING TABLET    Take 1 tablet by mouth every 8 hours as needed for Nausea or Vomiting    VITAMIN D-3 (CHOLECALCIFEROL) 5000 UNITS TABS    Take 5,000 Units by mouth 3 times daily            Bee venom, Erythromycin, and Janumet [sitagliptin-metformin hcl er]    FAMILY HISTORY       Family History   Problem Relation Age of Onset    Diabetes Mother     High Cholesterol Mother     Hypertension Mother     Heart Disease Father     Hypertension Father     Other Father         abdominal aneurysm    Mult Sclerosis Sister     Diabetes Maternal Grandmother     Diabetes Maternal Grandfather     Colon Cancer Paternal Grandmother     Other Paternal Grandfather         brain aneurysm    Colon Polyps Other         Aunts with colon polyps          SOCIAL HISTORY       Social History     Socioeconomic History    Marital status:      Spouse name: None    Number of children: None    Years of education: None    Highest education level: None   Occupational History    Occupation:      Comment: not working due to medical problems   Social Needs    Financial resource strain: None    Food insecurity     Worry: None     Inability: None    Transportation needs     Medical: None     Non-medical: None   Tobacco Use    Smoking status: Never Smoker    Smokeless tobacco: Never Used   Substance and Sexual Activity    Alcohol use: No     Comment: occ    Drug use: No    Sexual activity: Yes     Partners: Male   Lifestyle    Physical activity     Days per week: None     Minutes per session: None    Stress: None   Relationships    Social connections     Talks on Behavior normal.         DIAGNOSTIC RESULTS     EKG: All EKG's are interpreted by the Emergency Department Physician who either signs orCo-signs this chart in the absence of a cardiologist.        RADIOLOGY:   plain film images such as CT, Ultrasound and MRI are read by the radiologist. Plain radiographic images are visualized and preliminarily interpreted by the emergency physician with the below findings:        Interpretation per the Radiologist below, ifavailable at the time of this note:    No orders to display         ED BEDSIDE ULTRASOUND:   Performed by ED Physician - none    LABS:  Labs Reviewed - No data to display    All other labs were within normal range ornot returned as of this dictation. EMERGENCY DEPARTMENT COURSE and DIFFERENTIAL DIAGNOSIS/MDM:   Vitals:    Vitals:    03/30/21 1822 03/30/21 1830 03/30/21 1845 03/30/21 1900   BP:  (!) 151/64 (!) 169/107 119/77   Pulse: 78 74 74 73   Resp: 17 11 19 23   SpO2: 97% 96% 97% 97%           MDM  Number of Diagnoses or Management Options  Diagnosis management comments: Patient with a history of MS comes to the ER complaining of recurrent dizziness. She was admitted a few days ago for dizziness which improved after hospitalization and symptomatic treatment. Patient will receive Valium along with some Decadron to see if her symptoms improve. We suspect that she can ambulate without assistance she can be discharged home. CRITICAL CARE TIME   Total CriticalCare time was  minutes, excluding separately reportable procedures. There was a high probability of clinically significant/life threatening deterioration in the patient's condition which required my urgent intervention. CONSULTS:  None    PROCEDURES:  Unlessotherwise noted below, none     Procedures    FINAL IMPRESSION      1. Dizziness    2. History of multiple sclerosis ([facility] [address]          DISPOSITION/PLAN   DISPOSITION        PATIENT REFERRED TO:  No follow-up provider specified.     DISCHARGE MEDICATIONS:  New Prescriptions    No medications on file              (Please note that portions of this note were completed with a voice recognition program.  Efforts were made to edit the dictations but occasionally words are mis-transcribed.)      Bienvenido Bedoya MD (electronically signed)  Attending Emergency Physician            Bienvenido Bedoya MD  03/31/21 [id number]

## 2021-03-30 NOTE — ED NOTES
Bed: 08  Expected date: 3/30/21  Expected time: 4:57 PM  Means of arrival:   Comments:  63 yr illness     Jose Alejandro Mccrary RN  03/30/21 1975

## 2021-04-09 ENCOUNTER — HOSPITAL ENCOUNTER (EMERGENCY)
Age: 61
Discharge: HOME | End: 2021-04-09
Attending: EMERGENCY MEDICINE
Payer: MEDICARE

## 2021-04-09 VITALS
TEMPERATURE: 97.2 F | RESPIRATION RATE: 15 BRPM | SYSTOLIC BLOOD PRESSURE: 141 MMHG | OXYGEN SATURATION: 92 % | HEART RATE: 73 BPM | DIASTOLIC BLOOD PRESSURE: 58 MMHG

## 2021-04-09 DIAGNOSIS — R42 VERTIGO: Primary | ICD-10-CM

## 2021-04-09 DIAGNOSIS — G35 MULTIPLE SCLEROSIS EXACERBATION (HCC): ICD-10-CM

## 2021-04-09 LAB
ABSOLUTE EOS #: 0.13 K/UL (ref 0–0.44)
ABSOLUTE IMMATURE GRANULOCYTE: <0.03 K/UL (ref 0–0.3)
ABSOLUTE LYMPH #: 1.13 K/UL (ref 1.1–3.7)
ABSOLUTE MONO #: 0.5 K/UL (ref 0.1–1.2)
ALBUMIN SERPL-MCNC: 4.2 G/DL (ref 3.5–5.2)
ALBUMIN/GLOBULIN RATIO: 1.2 (ref 1–2.5)
ALP BLD-CCNC: 89 U/L (ref 35–104)
ALT SERPL-CCNC: 10 U/L (ref 5–33)
ANION GAP SERPL CALCULATED.3IONS-SCNC: 13 MMOL/L (ref 9–17)
AST SERPL-CCNC: 13 U/L
BASOPHILS # BLD: 1 % (ref 0–2)
BASOPHILS ABSOLUTE: 0.05 K/UL (ref 0–0.2)
BILIRUB SERPL-MCNC: 0.18 MG/DL (ref 0.3–1.2)
BUN BLDV-MCNC: 24 MG/DL (ref 6–20)
BUN/CREAT BLD: 24 (ref 9–20)
CALCIUM SERPL-MCNC: 9.7 MG/DL (ref 8.6–10.4)
CHLORIDE BLD-SCNC: 100 MMOL/L (ref 98–107)
CO2: 24 MMOL/L (ref 20–31)
CREAT SERPL-MCNC: 0.99 MG/DL (ref 0.5–0.9)
DIFFERENTIAL TYPE: ABNORMAL
EOSINOPHILS RELATIVE PERCENT: 2 % (ref 1–4)
GFR AFRICAN AMERICAN: >60 ML/MIN
GFR NON-AFRICAN AMERICAN: 57 ML/MIN
GFR SERPL CREATININE-BSD FRML MDRD: ABNORMAL ML/MIN/{1.73_M2}
GFR SERPL CREATININE-BSD FRML MDRD: ABNORMAL ML/MIN/{1.73_M2}
GLUCOSE BLD-MCNC: 212 MG/DL (ref 70–99)
HCT VFR BLD CALC: 40.6 % (ref 36.3–47.1)
HEMOGLOBIN: 13 G/DL (ref 11.9–15.1)
IMMATURE GRANULOCYTES: 0 %
LYMPHOCYTES # BLD: 17 % (ref 24–43)
MCH RBC QN AUTO: 29.3 PG (ref 25.2–33.5)
MCHC RBC AUTO-ENTMCNC: 32 G/DL (ref 28.4–34.8)
MCV RBC AUTO: 91.6 FL (ref 82.6–102.9)
MONOCYTES # BLD: 8 % (ref 3–12)
NRBC AUTOMATED: 0 PER 100 WBC
PDW BLD-RTO: 12.9 % (ref 11.8–14.4)
PLATELET # BLD: 208 K/UL (ref 138–453)
PLATELET ESTIMATE: ABNORMAL
PMV BLD AUTO: 12 FL (ref 8.1–13.5)
POTASSIUM SERPL-SCNC: 4 MMOL/L (ref 3.7–5.3)
RBC # BLD: 4.43 M/UL (ref 3.95–5.11)
RBC # BLD: ABNORMAL 10*6/UL
SEG NEUTROPHILS: 72 % (ref 36–65)
SEGMENTED NEUTROPHILS ABSOLUTE COUNT: 4.74 K/UL (ref 1.5–8.1)
SODIUM BLD-SCNC: 137 MMOL/L (ref 135–144)
TOTAL PROTEIN: 7.6 G/DL (ref 6.4–8.3)
TROPONIN INTERP: NORMAL
TROPONIN T: NORMAL NG/ML
TROPONIN, HIGH SENSITIVITY: 7 NG/L (ref 0–14)
WBC # BLD: 6.6 K/UL (ref 3.5–11.3)
WBC # BLD: ABNORMAL 10*3/UL

## 2021-04-09 PROCEDURE — 36415 COLL VENOUS BLD VENIPUNCTURE: CPT

## 2021-04-09 PROCEDURE — 84484 ASSAY OF TROPONIN QUANT: CPT

## 2021-04-09 PROCEDURE — 6370000000 HC RX 637 (ALT 250 FOR IP): Performed by: EMERGENCY MEDICINE

## 2021-04-09 PROCEDURE — 80053 COMPREHEN METABOLIC PANEL: CPT

## 2021-04-09 PROCEDURE — 96374 THER/PROPH/DIAG INJ IV PUSH: CPT

## 2021-04-09 PROCEDURE — 6360000002 HC RX W HCPCS: Performed by: EMERGENCY MEDICINE

## 2021-04-09 PROCEDURE — 93005 ELECTROCARDIOGRAM TRACING: CPT | Performed by: EMERGENCY MEDICINE

## 2021-04-09 PROCEDURE — 99284 EMERGENCY DEPT VISIT MOD MDM: CPT

## 2021-04-09 PROCEDURE — 2580000003 HC RX 258: Performed by: EMERGENCY MEDICINE

## 2021-04-09 PROCEDURE — 96375 TX/PRO/DX INJ NEW DRUG ADDON: CPT

## 2021-04-09 PROCEDURE — 85025 COMPLETE CBC W/AUTO DIFF WBC: CPT

## 2021-04-09 RX ORDER — ONDANSETRON 4 MG/1
4 TABLET, ORALLY DISINTEGRATING ORAL 3 TIMES DAILY PRN
Qty: 21 TABLET | Refills: 0 | Status: SHIPPED | OUTPATIENT
Start: 2021-04-09 | End: 2021-04-16

## 2021-04-09 RX ORDER — DIAZEPAM 5 MG/1
5 TABLET ORAL ONCE
Status: COMPLETED | OUTPATIENT
Start: 2021-04-09 | End: 2021-04-09

## 2021-04-09 RX ORDER — METHYLPREDNISOLONE SODIUM SUCCINATE 125 MG/2ML
125 INJECTION, POWDER, LYOPHILIZED, FOR SOLUTION INTRAMUSCULAR; INTRAVENOUS ONCE
Status: COMPLETED | OUTPATIENT
Start: 2021-04-09 | End: 2021-04-09

## 2021-04-09 RX ORDER — DIAZEPAM 10 MG/1
10 TABLET ORAL 3 TIMES DAILY PRN
Qty: 15 TABLET | Refills: 0 | Status: SHIPPED | OUTPATIENT
Start: 2021-04-09 | End: 2021-04-14

## 2021-04-09 RX ORDER — DIAZEPAM 5 MG/ML
5 INJECTION, SOLUTION INTRAMUSCULAR; INTRAVENOUS ONCE
Status: DISCONTINUED | OUTPATIENT
Start: 2021-04-09 | End: 2021-04-09

## 2021-04-09 RX ORDER — 0.9 % SODIUM CHLORIDE 0.9 %
1000 INTRAVENOUS SOLUTION INTRAVENOUS ONCE
Status: COMPLETED | OUTPATIENT
Start: 2021-04-09 | End: 2021-04-09

## 2021-04-09 RX ORDER — ONDANSETRON 2 MG/ML
4 INJECTION INTRAMUSCULAR; INTRAVENOUS ONCE
Status: COMPLETED | OUTPATIENT
Start: 2021-04-09 | End: 2021-04-09

## 2021-04-09 RX ADMIN — DIAZEPAM 5 MG: 5 TABLET ORAL at 21:56

## 2021-04-09 RX ADMIN — ONDANSETRON 4 MG: 2 INJECTION INTRAMUSCULAR; INTRAVENOUS at 19:47

## 2021-04-09 RX ADMIN — DIAZEPAM 5 MG: 5 TABLET ORAL at 19:59

## 2021-04-09 RX ADMIN — METHYLPREDNISOLONE SODIUM SUCCINATE 125 MG: 125 INJECTION, POWDER, FOR SOLUTION INTRAMUSCULAR; INTRAVENOUS at 19:46

## 2021-04-09 RX ADMIN — SODIUM CHLORIDE 1000 ML: 9 INJECTION, SOLUTION INTRAVENOUS at 19:46

## 2021-04-09 ASSESSMENT — ENCOUNTER SYMPTOMS
SHORTNESS OF BREATH: 0
NAUSEA: 1
SORE THROAT: 0
PHOTOPHOBIA: 0
COUGH: 0
COLOR CHANGE: 0
VOMITING: 1

## 2021-04-09 NOTE — ED PROVIDER NOTES
Hypertension     Hypothyroidism     Morbid obesity ([facility] [address]     Multiple sclerosis ([facility] [address]     Neuropathy     MINH (obstructive sleep apnea)     intolerant of cpap    PAD (peripheral artery disease) (HCC)     SV[facility] tachy[facility]) [facility]     Type II or unspecified type diabetes mellitus without mention of complication, not stated as uncontrolled     Vertigo        SURGICAL HISTORY       Past Surgical History:   Procedure Laterality Date    CARDIAC CATHETERIZATION Left 2018    [facility]Dr Pollard    CARPAL TUNNEL RELEASE      left    CERVICAL DISC SURGERY       SECTION          DILATION AND CURETTAGE OF UTERUS      HYSTERECTOMY  2006    cervical dysplasia       CURRENT MEDICATIONS       Previous Medications    ASPIRIN 81 MG EC TABLET    Take 1 tablet by mouth daily    ATORVASTATIN (LIPITOR) 40 MG TABLET    Take 1 tablet by mouth nightly    BACLOFEN (LIORESAL) 20 MG TABLET    Take 1 tablet by mouth 4 times daily    CLOPIDOGREL (PLAVIX) 75 MG TABLET    Take 1 tablet by mouth daily    FUROSEMIDE (LASIX) 20 MG TABLET    Take 20 mg by mouth daily    LEVOTHYROXINE (SYNTHROID) 200 MCG TABLET    Take 1 tablet by mouth Daily    MAGNESIUM 250 MG TABS    Take 500 mg by mouth every morning     MECLIZINE (ANTIVERT) 25 MG TABLET    Take 1 tablet by mouth 3 times daily as needed for Dizziness or Nausea    METFORMIN (GLUCOPHAGE) 1000 MG TABLET    Take 1 tablet by mouth daily (with breakfast). METOPROLOL TARTRATE (LOPRESSOR) 25 MG TABLET    Take 1 tablet by mouth 2 times daily    NITROGLYCERIN (NITROSTAT) 0.4 MG SL TABLET    up to max of 3 total doses. If no relief after 1 dose, call 911.     NONFORMULARY    Take 1 tablet by mouth daily OTC Potassium supplement    ONDANSETRON (ZOFRAN ODT) 4 MG DISINTEGRATING TABLET    Take 1 tablet by mouth every 8 hours as needed for Nausea or Vomiting    ONDANSETRON (ZOFRAN ODT) 4 MG DISINTEGRATING TABLET    Take 1 tablet by mouth every Social History Narrative    None       SCREENINGS    [place] Coma Scale  Eye Opening: Spontaneous  Best Verbal Response: Oriented  Best Motor Response: Obeys commands  [place] Coma Scale Score: 15      PHYSICAL EXAM    (up to 7 for level 4, 8 or more for level 5)   @[email or web address]    Physical Exam  Vitals signs and nursing note reviewed. Constitutional:       General: She is not in acute distress. Appearance: Normal appearance. She is not ill-appearing, toxic-appearing or diaphoretic. HENT:      Head: Normocephalic and atraumatic. Right Ear: Tympanic membrane, ear canal and external ear normal.      Left Ear: Tympanic membrane, ear canal and external ear normal.   Eyes:      General: No scleral icterus. Right eye: No discharge. Left eye: No discharge. Extraocular Movements: Extraocular movements intact. Conjunctiva/sclera: Conjunctivae normal.      Pupils: Pupils are equal, round, and reactive to light. Neck:      Musculoskeletal: Normal range of motion and neck supple. No neck rigidity or muscular tenderness. Cardiovascular:      Rate and Rhythm: Normal rate and regular rhythm. Pulses: Normal pulses. Heart sounds: Normal heart sounds. No murmur. No friction rub. No gallop. Pulmonary:      Effort: Pulmonary effort is normal. No respiratory distress. Breath sounds: Normal breath sounds. No wheezing, rhonchi or rales. Abdominal:      General: Abdomen is flat. Bowel sounds are normal. There is no distension. Palpations: Abdomen is soft. Tenderness: There is no abdominal tenderness. There is no guarding. Hernia: No hernia is present. Musculoskeletal: Normal range of motion. General: No swelling, tenderness, deformity or signs of injury. Skin:     General: Skin is warm and dry. Capillary Refill: Capillary refill takes less than 2 seconds. Findings: No bruising, erythema or rash.    Neurological:      General: No focal deficit present. Mental Status: She is alert and oriented to person, place, and time. Cranial Nerves: No cranial nerve deficit. Sensory: No sensory deficit. Comments: Cranial nerves II through XII are grossly intact there are no focal neurologic deficits. No pronator drift no dysmetria no truncal ataxia. Patient does have horizontal nystagmus noted and positive Hallpike Covington exam.  NIH stroke scale score of 0   Psychiatric:         Mood and Affect: Mood normal.         Behavior: Behavior normal.         Thought Content: Thought content normal.         Judgment: Judgment normal.         DIAGNOSTIC RESULTS     EKG (Per Emergency Physician):       RADIOLOGY (Per Emergency Physician): Interpretation per the Radiologist below, if available at the time of this note:  No results found. ED BEDSIDE ULTRASOUND:   Performed by ED Physician - none    LABS:  Labs Reviewed   CBC WITH AUTO DIFFERENTIAL - Abnormal; Notable for the following components:       Result Value    Seg Neutrophils 72 (*)     Lymphocytes 17 (*)     All other components within normal limits   COMPREHENSIVE METABOLIC PANEL - Abnormal; Notable for the following components:    Glucose 212 (*)     BUN 24 (*)     CREATININE 0.99 (*)     Bun/Cre Ratio 24 (*)     Total Bilirubin 0.18 (*)     GFR Non- 57 (*)     All other components within normal limits   TROPONIN        All other labs were within normal range or not returned as of this dictation.     EMERGENCY DEPARTMENT COURSE and DIFFERENTIAL DIAGNOSIS/MDM:   Vitals:    Vitals:    04/09/21 2115 04/09/21 2123 04/09/21 2130 04/09/21 2145   BP: (!) 169/70  (!) 153/72 (!) 148/52   Pulse: 77  72 70   Resp: 15 18 13 14   Temp:       SpO2: 96% 96% 95% 96%       Medications   0.9 % sodium chloride bolus (0 mLs Intravenous Stopped 4/9/21 2046)   ondansetron (ZOFRAN) injection 4 mg (4 mg Intravenous Given 4/9/21 1947)   methylPREDNISolone sodium (SOLU-MEDROL) injection 125 mg (125 mg Intravenous Given 4/9/21 1946)   diazePAM (VALIUM) tablet 5 mg (5 mg Oral Given 4/9/21 1959)   diazePAM (VALIUM) tablet 5 mg (5 mg Oral Given 4/9/21 2156)       MDM. Patient presented with history and exam most consistent with peripheral vertigo which I believe was brought on by an MS exacerbation. We discussed possible imaging studies but she states that as she was just admitted and had multiple tests obtained that were normal she does not want them at this time. The patient was given IV fluids and Solu-Medrol but also provided with oral Valium. After receiving 10 mg of oral Valium she had resolution of her nystagmus and dizziness and was able to stand with a steady gait and therefore will be discharged home. REVAL:         CRITICAL CARE TIME   Total Critical Care time was minutes, excluding separately reportable procedures. There was a high probability of clinically significant/life threatening deterioration in the patient's condition which required my urgent intervention. CONSULTS:  None    PROCEDURES:  Unless otherwise noted below, none     Procedures    FINAL IMPRESSION      1. Vertigo    2. Multiple sclerosis exacerbation [facility])          DISPOSITION/PLAN   DISPOSITION Decision To Discharge 04/09/2021 11:20:24 PM      PATIENT REFERRED TO:  Ofilia Hashimoto, DO  [address] [place] [address]    Schedule an appointment as soon as possible for a visit in 1 week  If symptoms worsen      DISCHARGE MEDICATIONS:  New Prescriptions    DIAZEPAM (VALIUM) 10 MG TABLET    Take 1 tablet by mouth 3 times daily as needed for Anxiety (dizziness) for up to 5 days.     ONDANSETRON (ZOFRAN ODT) 4 MG DISINTEGRATING TABLET    Take 1 tablet by mouth 3 times daily as needed for Nausea or Vomiting          (Please note:  Portions of this note were completed with a voice recognition program.  Efforts were made to edit the dictations but occasionally words and phrases are mis-transcribed.)  Form

## 2021-04-10 LAB
EKG ATRIAL RATE: 65 BPM
EKG P AXIS: 43 DEGREES
EKG P-R INTERVAL: 130 MS
EKG Q-T INTERVAL: 406 MS
EKG QRS DURATION: 88 MS
EKG QTC CALCULATION (BAZETT): 422 MS
EKG R AXIS: 13 DEGREES
EKG T AXIS: 26 DEGREES
EKG VENTRICULAR RATE: 65 BPM

## 2021-04-10 PROCEDURE — 93010 ELECTROCARDIOGRAM REPORT: CPT | Performed by: INTERNAL MEDICINE

## 2021-04-19 PROBLEM — E86.0 DEHYDRATION: Status: RESOLVED | Noted: 2021-03-20 | Resolved: 2021-04-19

## 2021-04-22 ENCOUNTER — HOSPITAL ENCOUNTER (EMERGENCY)
Age: 61
Discharge: HOME | End: 2021-04-22
Attending: EMERGENCY MEDICINE
Payer: MEDICARE

## 2021-04-22 VITALS
OXYGEN SATURATION: 96 % | TEMPERATURE: 97.6 F | SYSTOLIC BLOOD PRESSURE: 159 MMHG | HEART RATE: 71 BPM | RESPIRATION RATE: 16 BRPM | DIASTOLIC BLOOD PRESSURE: 67 MMHG

## 2021-04-22 DIAGNOSIS — R42 VERTIGO: Primary | ICD-10-CM

## 2021-04-22 PROCEDURE — 99284 EMERGENCY DEPT VISIT MOD MDM: CPT

## 2021-04-22 PROCEDURE — 6370000000 HC RX 637 (ALT 250 FOR IP): Performed by: EMERGENCY MEDICINE

## 2021-04-22 RX ORDER — ONDANSETRON 4 MG/1
4 TABLET, ORALLY DISINTEGRATING ORAL ONCE
Status: COMPLETED | OUTPATIENT
Start: 2021-04-22 | End: 2021-04-22

## 2021-04-22 RX ORDER — DIAZEPAM 10 MG/1
10 TABLET ORAL EVERY 8 HOURS PRN
COMMUNITY
End: 2022-04-21

## 2021-04-22 RX ORDER — MECLIZINE HYDROCHLORIDE 25 MG/1
25 TABLET ORAL 3 TIMES DAILY PRN
Qty: 20 TABLET | Refills: 0 | Status: SHIPPED | OUTPATIENT
Start: 2021-04-22

## 2021-04-22 RX ORDER — MECLIZINE HCL 12.5 MG/1
25 TABLET ORAL ONCE
Status: COMPLETED | OUTPATIENT
Start: 2021-04-22 | End: 2021-04-22

## 2021-04-22 RX ADMIN — ONDANSETRON 4 MG: 4 TABLET, ORALLY DISINTEGRATING ORAL at 01:48

## 2021-04-22 RX ADMIN — MECLIZINE 25 MG: 12.5 TABLET ORAL at 01:48

## 2021-04-22 NOTE — ED NOTES
Patient up to [facility] to urinate large amount of clear yellow urine. Patient then walked in hallway approximately 12 feet and back to room. States feeling better and wanting to go home. Dr. Allison Cardona notified.      Payal Moreland RN  04/22/21 8257

## 2021-04-22 NOTE — ED PROVIDER NOTES
[facility] [place] ED  EMERGENCY DEPARTMENT ENCOUNTER      Pt Name: Jayro Guallpa  MRN: [id number]  [place] 1960  Date of evaluation: 4/22/2021  Provider: Deysi Young MD    [address] [place] [address]       Chief Complaint   Patient presents with    Dizziness     took 10mg valium at 2130.  Nausea     took zofran 2130 with little relief. HISTORY OF PRESENT ILLNESS   (Location/Symptom, Timing/Onset, Context/Setting, Quality, Duration, Modifying Factors, Severity)  Note limiting factors. Jayro Guallpa is a 63 y.o. female who presents to the emergency department      This is a very pleasant 65-year-old female with a history of multiple sclerosis and vertigo presented to emergency department for evaluation of vertigo that started this evening. Vertigo was accompanied by nausea and vomiting. She states she does have vertigo with flares of her MS. She has been prescribed Zofran as well as Valium to take at home when she has episodes. She did take 10 mg of Valium around 930 as well as 4 mg of Zofran. She did have improvement with her nausea but is still experiencing vertigo. She does report that she had been instructed by neurology to take Valium 10 mg 3 times a day however she is not been able to tolerate this and has been taking the Valium only as needed with symptoms of vertigo. This is generally been working for her however today it did not control her symptoms. Patient denies any headaches. She has not any URI symptoms. No fevers or chills. Denies any visual disturbances extremity weakness paresthesias headaches or any other acute concerns. Nursing Notes were reviewed. REVIEW OF SYSTEMS    (2-9 systems for level 4, 10 or more for level 5)     Review of Systems   All other systems reviewed and are negative. Except as noted above the remainder of the review of systems was reviewed and negative.        PAST MEDICAL HISTORY     Past Medical History:   Diagnosis Date    Alveolar hypoventilation     Dermatitis herpetiformis     GERD (gastroesophageal reflux disease)     Gout     Hearing loss     Hyperlipidemia     Hypertension     Hypothyroidism     Morbid obesity (HCC)     Multiple sclerosis (HCC)     Neuropathy     MINH (obstructive sleep apnea)     intolerant of cpap    PAD [facility]per[facility]) [facility])     SVT (supraventricular tachycardia) ([facility])     Type II or unspecified type diabetes mellitus without mention of complication, not stated as uncontrolled     Vertigo          SURGICAL HISTORY       Past Surgical History:   Procedure Laterality Date    CARDIAC CATHETERIZATION Left 2018    right radial/[facility]/Dr Pollard    CARPAL TUNNEL RELEASE      left    CERVICAL [place] SURGERY       SECTION          DILATION AND CURETTAGE OF UTERUS      HYSTERECTOMY  2006    cervical dysplasia         CURRENT MEDICATIONS       Discharge Medication List as of 2021  3:37 AM      CONTINUE these medications which have NOT CHANGED    Details   diazePAM (VALIUM) 10 MG tablet Take 10 mg by mouth every 8 hours as needed. Historical Med      !! ondansetron (ZOFRAN ODT) 4 MG disintegrating tablet Take 1 tablet by mouth every 8 hours as needed for Nausea, Disp-20 tablet, R-0Print      baclofen (LIORESAL) 20 MG tablet Take 1 tablet by mouth 4 times daily, Disp-90 tablet, R-0Adjust Sig      !! ondansetron (ZOFRAN ODT) 4 MG disintegrating tablet Take 1 tablet by mouth every 8 hours as needed for Nausea or Vomiting, Disp-15 tablet, R-0Normal      NONFORMULARY Take 1 tablet by mouth daily OTC Potassium supplementHistorical Med      vitamin D-3 (CHOLECALCIFEROL) 5000 units TABS Take 5,000 Units by mouth 3 times dailyHistorical Med      aspirin 81 MG EC tablet Take 1 tablet by mouth daily, Disp-30 tablet, R-3Normal      atorvastatin (LIPITOR) 40 MG tablet Take 1 tablet by mouth nightly, Disp-30 tablet, R-3Normal      metoprolol tartrate (LOPRESSOR) 25 MG tablet Take 1 tablet by mouth 2 times daily, Disp-60 tablet, R-3Normal      clopidogrel (PLAVIX) 75 MG tablet Take 1 tablet by mouth daily, Disp-30 tablet, R-3Normal      furosemide (LASIX) 20 MG tablet Take 20 mg by mouth dailyHistorical Med      nitroGLYCERIN (NITROSTAT) 0.4 MG SL tablet up to max of 3 total doses. If no relief after 1 dose, call 911., Disp-25 tablet, R-3Normal      levothyroxine (SYNTHROID) 200 MCG tablet Take 1 tablet by mouth Daily, Disp-30 tablet, R-1Normal      metformin (GLUCOPHAGE) 1000 MG tablet Take 1 tablet by mouth daily (with breakfast). , Disp-180 tablet, R-3Normal      Magnesium 250 MG TABS Take 500 mg by mouth every morning Historical Med       !! - Potential duplicate medications found. Please discuss with provider. ALLERGIES     Bee venom, Erythromycin, and Janumet [sitagliptin-metformin hcl er]    FAMILY HISTORY       Family History   Problem Relation Age of Onset    Diabetes Mother     High Cholesterol Mother     Hypertension Mother     Heart Disease Father     Hypertension Father     Other Father         abdominal aneurysm    Mult Sclerosis Sister     Diabetes Maternal Grandmother     Diabetes Maternal Grandfather     Colon Cancer Paternal Grandmother     Other Paternal Grandfather         brain aneurysm    Colon Polyps Other         Aunts with colon polyps          SOCIAL HISTORY       Social History     Socioeconomic History    Marital status:      Spouse name: None    Number of children: None    Years of education: None    Highest education level: None   Occupational History    Occupation:      Comment: not working due to medical problems   Social Needs    Financial resource strain: None    Food insecurity     Worry: None     Inability: None    Transportation needs     Medical: None     Non-medical: None   Tobacco Use    Smoking status: Never Smoker    Smokeless tobacco: Never Used   Substance and Sexual Activity    Alcohol use:  No Comment: occ    Drug use: No    Sexual activity: Yes     Partners: Male   Lifestyle    Physical activity     Days per week: None     Minutes per session: None    Stress: None   Relationships    Social connections     Talks on phone: None     Gets together: None     Attends Sikh service: None     Active member of club or organization: None     Attends meetings of clubs or organizations: None     Relationship status: None    Intimate partner violence     Fear of current or ex partner: None     Emotionally abused: None     Physically abused: None     Forced sexual activity: None   Other Topics Concern    None   Social History Narrative    None       SCREENINGS                        PHYSICAL EXAM    (up to 7 for level 4, 8 or more for level 5)     ED Triage Vitals   BP Temp Temp Source Pulse Resp SpO2 Height Weight   04/22/21 0118 04/22/21 0117 04/22/21 0117 04/22/21 0117 04/22/21 0117 04/22/21 0117 -- --   (!) 174/84 97.6 °F (36.4 °C) Temporal 71 16 100 %         Physical Exam  Vitals signs and nursing note reviewed. Constitutional:       General: She is not in acute distress. Appearance: She is not toxic-appearing. HENT:      Head: Normocephalic and atraumatic. Eyes:      Comments: No nystagmus at rest.   Cardiovascular:      Rate and Rhythm: Normal rate and regular rhythm. Pulmonary:      Effort: Pulmonary effort is normal.      Breath sounds: Normal breath sounds. Abdominal:      General: There is no distension. Palpations: Abdomen is soft. Tenderness: There is no abdominal tenderness. Neurological:      General: No focal deficit present. Mental Status: She is alert. Comments: Strength 5 out of 5 upper and lower extremities flexors and extensors bilaterally. No gross sensorimotor deficits noted.    Psychiatric:         Mood and Affect: Mood normal.         DIAGNOSTIC RESULTS     EKG: All EKG's are interpreted by the Emergency Department Physician who either signs or are mis-transcribed.)    Ada Reyes MD (electronically signed)  Attending Emergency Physician             Ada Reyes MD  04/27/21

## 2021-05-11 ENCOUNTER — APPOINTMENT (OUTPATIENT)
Dept: GENERAL RADIOLOGY | Age: 61
End: 2021-05-11
Payer: MEDICARE

## 2021-05-11 ENCOUNTER — HOSPITAL ENCOUNTER (OUTPATIENT)
Age: 61
Setting detail: OBSERVATION
Discharge: HOME | End: 2021-05-12
Attending: FAMILY MEDICINE | Admitting: INTERNAL MEDICINE
Payer: MEDICARE

## 2021-05-11 DIAGNOSIS — Z86.79 PERSONAL HISTORY OF CORONARY ARTERY DISEASE: ICD-10-CM

## 2021-05-11 DIAGNOSIS — R07.9 CHEST PAIN AT REST: Primary | ICD-10-CM

## 2021-05-11 LAB
ABSOLUTE EOS #: 0.08 K/UL (ref 0–0.44)
ABSOLUTE IMMATURE GRANULOCYTE: <0.03 K/UL (ref 0–0.3)
ABSOLUTE LYMPH #: 1.12 K/UL (ref 1.1–3.7)
ABSOLUTE MONO #: 0.59 K/UL (ref 0.1–1.2)
ALBUMIN SERPL-MCNC: 4.1 G/DL (ref 3.5–5.2)
ALBUMIN/GLOBULIN RATIO: 1.4 (ref 1–2.5)
ALP BLD-CCNC: 85 U/L (ref 35–104)
ALT SERPL-CCNC: 10 U/L (ref 5–33)
ANION GAP SERPL CALCULATED.3IONS-SCNC: 10 MMOL/L (ref 9–17)
AST SERPL-CCNC: 12 U/L
BASOPHILS # BLD: 1 % (ref 0–2)
BASOPHILS ABSOLUTE: 0.06 K/UL (ref 0–0.2)
BILIRUB SERPL-MCNC: 0.22 MG/DL (ref 0.3–1.2)
BUN BLDV-MCNC: 24 MG/DL (ref 6–20)
BUN/CREAT BLD: 20 (ref 9–20)
CALCIUM SERPL-MCNC: 9.7 MG/DL (ref 8.6–10.4)
CHLORIDE BLD-SCNC: 98 MMOL/L (ref 98–107)
CO2: 29 MMOL/L (ref 20–31)
CREAT SERPL-MCNC: 1.19 MG/DL (ref 0.5–0.9)
DIFFERENTIAL TYPE: ABNORMAL
EOSINOPHILS RELATIVE PERCENT: 1 % (ref 1–4)
GFR AFRICAN AMERICAN: 56 ML/MIN
GFR NON-AFRICAN AMERICAN: 46 ML/MIN
GFR SERPL CREATININE-BSD FRML MDRD: ABNORMAL ML/MIN/{1.73_M2}
GFR SERPL CREATININE-BSD FRML MDRD: ABNORMAL ML/MIN/{1.73_M2}
GLUCOSE BLD-MCNC: 232 MG/DL (ref 70–99)
HCT VFR BLD CALC: 40.7 % (ref 36.3–47.1)
HEMOGLOBIN: 12.8 G/DL (ref 11.9–15.1)
IMMATURE GRANULOCYTES: 0 %
LYMPHOCYTES # BLD: 17 % (ref 24–43)
MCH RBC QN AUTO: 29.2 PG (ref 25.2–33.5)
MCHC RBC AUTO-ENTMCNC: 31.4 G/DL (ref 28.4–34.8)
MCV RBC AUTO: 92.9 FL (ref 82.6–102.9)
MONOCYTES # BLD: 9 % (ref 3–12)
NRBC AUTOMATED: 0 PER 100 WBC
PDW BLD-RTO: 12.8 % (ref 11.8–14.4)
PLATELET # BLD: 191 K/UL (ref 138–453)
PLATELET ESTIMATE: ABNORMAL
PMV BLD AUTO: 12.8 FL (ref 8.1–13.5)
POTASSIUM SERPL-SCNC: 4.1 MMOL/L (ref 3.7–5.3)
RBC # BLD: 4.38 M/UL (ref 3.95–5.11)
RBC # BLD: ABNORMAL 10*6/UL
SEG NEUTROPHILS: 72 % (ref 36–65)
SEGMENTED NEUTROPHILS ABSOLUTE COUNT: 4.73 K/UL (ref 1.5–8.1)
SODIUM BLD-SCNC: 137 MMOL/L (ref 135–144)
TOTAL PROTEIN: 7 G/DL (ref 6.4–8.3)
TROPONIN INTERP: NORMAL
TROPONIN T: NORMAL NG/ML
TROPONIN, HIGH SENSITIVITY: 9 NG/L (ref 0–14)
WBC # BLD: 6.6 K/UL (ref 3.5–11.3)
WBC # BLD: ABNORMAL 10*3/UL

## 2021-05-11 PROCEDURE — 36415 COLL VENOUS BLD VENIPUNCTURE: CPT

## 2021-05-11 PROCEDURE — 96372 THER/PROPH/DIAG INJ SC/IM: CPT

## 2021-05-11 PROCEDURE — 84484 ASSAY OF TROPONIN QUANT: CPT

## 2021-05-11 PROCEDURE — 99285 EMERGENCY DEPT VISIT HI MDM: CPT

## 2021-05-11 PROCEDURE — 93005 ELECTROCARDIOGRAM TRACING: CPT | Performed by: FAMILY MEDICINE

## 2021-05-11 PROCEDURE — 71045 X-RAY EXAM CHEST 1 VIEW: CPT

## 2021-05-11 PROCEDURE — 96374 THER/PROPH/DIAG INJ IV PUSH: CPT

## 2021-05-11 PROCEDURE — 85025 COMPLETE CBC W/AUTO DIFF WBC: CPT

## 2021-05-11 PROCEDURE — 80053 COMPREHEN METABOLIC PANEL: CPT

## 2021-05-11 RX ORDER — DEXAMETHASONE SODIUM PHOSPHATE 10 MG/ML
10 INJECTION INTRAMUSCULAR; INTRAVENOUS ONCE
Status: COMPLETED | OUTPATIENT
Start: 2021-05-12 | End: 2021-05-12

## 2021-05-11 ASSESSMENT — PAIN DESCRIPTION - PAIN TYPE: TYPE: ACUTE PAIN

## 2021-05-11 ASSESSMENT — ENCOUNTER SYMPTOMS
RESPIRATORY NEGATIVE: 1
NAUSEA: 1
EYES NEGATIVE: 1

## 2021-05-11 ASSESSMENT — PAIN DESCRIPTION - LOCATION: LOCATION: CHEST

## 2021-05-11 ASSESSMENT — PAIN SCALES - GENERAL: PAINLEVEL_OUTOF10: 3

## 2021-05-12 ENCOUNTER — APPOINTMENT (OUTPATIENT)
Dept: NON INVASIVE DIAGNOSTICS | Age: 61
End: 2021-05-12
Payer: MEDICARE

## 2021-05-12 VITALS
WEIGHT: 198 LBS | SYSTOLIC BLOOD PRESSURE: 141 MMHG | RESPIRATION RATE: 16 BRPM | DIASTOLIC BLOOD PRESSURE: 69 MMHG | HEIGHT: 65 IN | BODY MASS INDEX: 32.99 KG/M2 | TEMPERATURE: 98 F | HEART RATE: 66 BPM | OXYGEN SATURATION: 100 %

## 2021-05-12 PROBLEM — R07.9 CHEST PAIN IN ADULT: Status: ACTIVE | Noted: 2021-05-12

## 2021-05-12 LAB
TROPONIN INTERP: NORMAL
TROPONIN T: NORMAL NG/ML
TROPONIN, HIGH SENSITIVITY: 9 NG/L (ref 0–14)

## 2021-05-12 PROCEDURE — 2580000003 HC RX 258: Performed by: INTERNAL MEDICINE

## 2021-05-12 PROCEDURE — 6360000002 HC RX W HCPCS: Performed by: INTERNAL MEDICINE

## 2021-05-12 PROCEDURE — 94761 N-INVAS EAR/PLS OXIMETRY MLT: CPT

## 2021-05-12 PROCEDURE — 93017 CV STRESS TEST TRACING ONLY: CPT

## 2021-05-12 PROCEDURE — 3430000000 HC RX DIAGNOSTIC RADIOPHARMACEUTICAL: Performed by: INTERNAL MEDICINE

## 2021-05-12 PROCEDURE — G0378 HOSPITAL OBSERVATION PER HR: HCPCS

## 2021-05-12 PROCEDURE — 6370000000 HC RX 637 (ALT 250 FOR IP): Performed by: INTERNAL MEDICINE

## 2021-05-12 PROCEDURE — 84484 ASSAY OF TROPONIN QUANT: CPT

## 2021-05-12 PROCEDURE — 78452 HT MUSCLE IMAGE SPECT MULT: CPT

## 2021-05-12 PROCEDURE — 6360000002 HC RX W HCPCS: Performed by: FAMILY MEDICINE

## 2021-05-12 PROCEDURE — 36415 COLL VENOUS BLD VENIPUNCTURE: CPT

## 2021-05-12 PROCEDURE — A9500 TC99M SESTAMIBI: HCPCS | Performed by: INTERNAL MEDICINE

## 2021-05-12 RX ORDER — BACLOFEN 10 MG/1
20 TABLET ORAL 4 TIMES DAILY
Status: DISCONTINUED | OUTPATIENT
Start: 2021-05-12 | End: 2021-05-12

## 2021-05-12 RX ORDER — SODIUM CHLORIDE 0.9 % (FLUSH) 0.9 %
10 SYRINGE (ML) INJECTION PRN
Status: DISCONTINUED | OUTPATIENT
Start: 2021-05-12 | End: 2021-05-12 | Stop reason: HOSPADM

## 2021-05-12 RX ORDER — ACETAMINOPHEN 325 MG/1
650 TABLET ORAL EVERY 6 HOURS PRN
Status: DISCONTINUED | OUTPATIENT
Start: 2021-05-12 | End: 2021-05-12 | Stop reason: HOSPADM

## 2021-05-12 RX ORDER — OMEPRAZOLE 20 MG/1
20 CAPSULE, DELAYED RELEASE ORAL
Qty: 90 CAPSULE | Refills: 1 | Status: SHIPPED | OUTPATIENT
Start: 2021-05-12 | End: 2022-04-21

## 2021-05-12 RX ORDER — ONDANSETRON 2 MG/ML
4 INJECTION INTRAMUSCULAR; INTRAVENOUS EVERY 6 HOURS PRN
Status: DISCONTINUED | OUTPATIENT
Start: 2021-05-12 | End: 2021-05-12 | Stop reason: HOSPADM

## 2021-05-12 RX ORDER — PROMETHAZINE HYDROCHLORIDE 25 MG/1
12.5 TABLET ORAL EVERY 6 HOURS PRN
Status: DISCONTINUED | OUTPATIENT
Start: 2021-05-12 | End: 2021-05-12 | Stop reason: HOSPADM

## 2021-05-12 RX ORDER — CLOPIDOGREL BISULFATE 75 MG/1
75 TABLET ORAL DAILY
Status: DISCONTINUED | OUTPATIENT
Start: 2021-05-12 | End: 2021-05-12 | Stop reason: HOSPADM

## 2021-05-12 RX ORDER — SODIUM CHLORIDE 0.9 % (FLUSH) 0.9 %
5-40 SYRINGE (ML) INJECTION EVERY 12 HOURS SCHEDULED
Status: DISCONTINUED | OUTPATIENT
Start: 2021-05-12 | End: 2021-05-12 | Stop reason: HOSPADM

## 2021-05-12 RX ORDER — ASPIRIN 81 MG/1
81 TABLET ORAL DAILY
Status: DISCONTINUED | OUTPATIENT
Start: 2021-05-12 | End: 2021-05-12 | Stop reason: HOSPADM

## 2021-05-12 RX ORDER — BACLOFEN 10 MG/1
20 TABLET ORAL 4 TIMES DAILY
Status: DISCONTINUED | OUTPATIENT
Start: 2021-05-12 | End: 2021-05-12 | Stop reason: HOSPADM

## 2021-05-12 RX ORDER — POLYETHYLENE GLYCOL 3350 17 G/17G
17 POWDER, FOR SOLUTION ORAL DAILY PRN
Status: DISCONTINUED | OUTPATIENT
Start: 2021-05-12 | End: 2021-05-12 | Stop reason: HOSPADM

## 2021-05-12 RX ORDER — LEVOTHYROXINE SODIUM 0.1 MG/1
200 TABLET ORAL DAILY
Status: DISCONTINUED | OUTPATIENT
Start: 2021-05-12 | End: 2021-05-12 | Stop reason: HOSPADM

## 2021-05-12 RX ORDER — ACETAMINOPHEN 650 MG/1
650 SUPPOSITORY RECTAL EVERY 6 HOURS PRN
Status: DISCONTINUED | OUTPATIENT
Start: 2021-05-12 | End: 2021-05-12 | Stop reason: HOSPADM

## 2021-05-12 RX ADMIN — METFORMIN HYDROCHLORIDE 1000 MG: 500 TABLET ORAL at 07:59

## 2021-05-12 RX ADMIN — SODIUM CHLORIDE, PRESERVATIVE FREE 10 ML: 5 INJECTION INTRAVENOUS at 07:59

## 2021-05-12 RX ADMIN — METOPROLOL TARTRATE 25 MG: 25 TABLET, FILM COATED ORAL at 07:59

## 2021-05-12 RX ADMIN — Medication 10 MILLICURIE: at 10:03

## 2021-05-12 RX ADMIN — BACLOFEN 20 MG: 10 TABLET ORAL at 10:37

## 2021-05-12 RX ADMIN — CLOPIDOGREL BISULFATE 75 MG: 75 TABLET ORAL at 07:59

## 2021-05-12 RX ADMIN — LEVOTHYROXINE SODIUM 200 MCG: 100 TABLET ORAL at 07:59

## 2021-05-12 RX ADMIN — REGADENOSON 0.4 MG: 0.08 INJECTION, SOLUTION INTRAVENOUS at 11:55

## 2021-05-12 RX ADMIN — ENOXAPARIN SODIUM 40 MG: 40 INJECTION SUBCUTANEOUS at 07:59

## 2021-05-12 RX ADMIN — DEXAMETHASONE SODIUM PHOSPHATE 10 MG: 10 INJECTION INTRAMUSCULAR; INTRAVENOUS at 00:08

## 2021-05-12 RX ADMIN — Medication 30 MILLICURIE: at 11:52

## 2021-05-12 RX ADMIN — BACLOFEN 20 MG: 10 TABLET ORAL at 04:30

## 2021-05-12 RX ADMIN — ASPIRIN 81 MG: 81 TABLET, COATED ORAL at 07:59

## 2021-05-12 ASSESSMENT — PAIN SCALES - GENERAL
PAINLEVEL_OUTOF10: 0
PAINLEVEL_OUTOF10: 0

## 2021-05-12 NOTE — DISCHARGE INSTR - DIET

## 2021-05-12 NOTE — PROGRESS NOTES
Nutrition Assessment     Type and Reason for Visit: Initial    Nutrition Recommendations/Plan:  Encourage better use of whole grains, lean proteins     Nutrition Assessment:  Obesity r/t excess energy intakes relative to expenditure, AEB BMI >30. Weight regains after losses during covid, admittedly \"nervous eating\" and not sticking to prior [facility]. Seems unlikely to follow a carb controlled eating plan as would be recommended, but likely could do better with leaner protein choices. Will provide education information. Is aware of A1C 8.2.     Malnutrition Assessment:  Malnutrition Status: No malnutrition    Nutrition Related Findings: no malnutrition indices      Current Nutrition Therapies:    Diet NPO Effective Now    Anthropometric Measures:  · Height: 5' 5\" (165.1 cm)  · Current Body Wt: 198 lb (89.8 kg)   · BMI: 32.9    Nutrition Diagnosis:   · Overweight/Obese related to excessive energy intake as evidenced by BMI    Lab Results   Component Value Date     05/11/2021    K 4.1 05/11/2021    CL 98 05/11/2021    CO2 29 05/11/2021    BUN 24 (H) 05/11/2021    CREATININE 1.19 (H) 05/11/2021    GLUCOSE 232 (H) 05/11/2021    CALCIUM 9.7 05/11/2021    PROT 7.0 05/11/2021    LABALBU 4.1 05/11/2021    BILITOT 0.22 (L) 05/11/2021    ALKPHOS 85 05/11/2021    AST 12 05/11/2021    ALT 10 05/11/2021    LABGLOM 46 (L) 05/11/2021    GFRAA 56 (L) 05/11/2021     Lab Results   Component Value Date    LABA1C 8.2 (H) 03/21/2021     Lab Results   Component Value Date     03/21/2021     Nutrition Interventions:   Food and/or Nutrient Delivery:  Start Oral Diet  Nutrition Education/Counseling:  Education initiated   Coordination of Nutrition Care:  Continue to monitor while inpatient    Goals:  PO >75% meals with healthy carbs and lean proteins       Nutrition Monitoring and Evaluation:   Behavioral-Environmental Outcomes:  Beliefs and Attitutes, Readiness for Change   Food/Nutrient Intake Outcomes:  Food and Nutrient Intake  Physical Signs/Symptoms Outcomes:  Biochemical Data, Weight     Discharge Planning:    No discharge needs at this time     Electronically signed by Jordyn Elias RD, LD on 5/12/21 at 8:11 AM EDT    Contact: [phone]

## 2021-05-12 NOTE — ED PROVIDER NOTES
was reviewed and negative. PAST MEDICAL HISTORY     Past Medical History:   Diagnosis Date    Alveolar hypoventilation     Dermatitis herpetiformis     GERD (gastroesophageal reflux disease)     Gout     Hearing loss     Hyperlipidemia     Hypertension     Hypothyroidism     Morbid obesity ([address]     Multiple sclerosis (HCC)     Neuropathy     MINH (obstructive sleep apnea)     intolerant of cpap    PAD (peripheral artery disease) (HCC)     SVT (supraventricular tachycardia) (HCC)     Type II or unspecified type diabetes mellitus without mention of complication, not stated as uncontrolled     Vertigo          SURGICALHISTORY       Past Surgical History:   Procedure Laterality Date    CARDIAC CATHETERIZATION Left 2018    right radial/[facility]/Dr Pollard    CARPAL TUNNEL RELEASE      left    CERVICAL DISC SURGERY       SECTION          DILATION AND CURETTAGE OF UTERUS      HYSTERECTOMY  2006    cervical dysplasia         CURRENT MEDICATIONS       Previous Medications    ASPIRIN 81 MG EC TABLET    Take 1 tablet by mouth daily    ATORVASTATIN (LIPITOR) 40 MG TABLET    Take 1 tablet by mouth nightly    BACLOFEN (LIORESAL) 20 MG TABLET    Take 1 tablet by mouth 4 times daily    CLOPIDOGREL (PLAVIX) 75 MG TABLET    Take 1 tablet by mouth daily    DIAZEPAM (VALIUM) 10 MG TABLET    Take 10 mg by mouth every 8 hours as needed. FUROSEMIDE (LASIX) 20 MG TABLET    Take 20 mg by mouth daily    LEVOTHYROXINE (SYNTHROID) 200 MCG TABLET    Take 1 tablet by mouth Daily    MAGNESIUM 250 MG TABS    Take 500 mg by mouth every morning     MECLIZINE (ANTIVERT) 25 MG TABLET    Take 1 tablet by mouth 3 times daily as needed for Dizziness    METFORMIN (GLUCOPHAGE) 1000 MG TABLET    Take 1 tablet by mouth daily (with breakfast).     METOPROLOL TARTRATE (LOPRESSOR) 25 MG TABLET    Take 1 tablet by mouth 2 times daily    NITROGLYCERIN (NITROSTAT) 0.4 MG SL TABLET    up to max of 3 total doses. If no relief after 1 dose, call 911.     NONFORMULARY    Take 1 tablet by mouth daily OTC Potassium supplement    ONDANSETRON (ZOFRAN ODT) 4 MG DISINTEGRATING TABLET    Take 1 tablet by mouth every 8 hours as needed for Nausea or Vomiting    ONDANSETRON (ZOFRAN ODT) 4 MG DISINTEGRATING TABLET    Take 1 tablet by mouth every 8 hours as needed for Nausea    VITAMIN D-3 (CHOLECALCIFEROL) 5000 UNITS TABS    Take 5,000 Units by mouth 3 times daily            Bee venom, Erythromycin, and Janumet [sitagliptin-metformin hcl er]    FAMILY HISTORY       Family History   Problem Relation Age of Onset    Diabetes Mother     High Cholesterol Mother     Hypertension Mother     Heart Disease Father     Hypertension Father     Other Father         abdominal aneurysm    Mult Sclerosis Sister     Diabetes Maternal Grandmother     Diabetes Maternal Grandfather     Colon Cancer Paternal Grandmother     Other Paternal Grandfather         brain aneurysm    Colon Polyps Other         Aunts with colon polyps          SOCIAL HISTORY       Social History     Socioeconomic History    Marital status:      Spouse name: None    Number of children: None    Years of education: None    Highest education level: None   Occupational History    Occupation:      Comment: not working due to medical problems   Social Needs    Financial resource strain: None    Food insecurity     Worry: None     Inability: None    Transportation needs     Medical: None     Non-medical: None   Tobacco Use    Smoking status: Never Smoker    Smokeless tobacco: Never Used   Substance and Sexual Activity    Alcohol use: No     Comment: occ    Drug use: No    Sexual activity: Yes     Partners: Male   Lifestyle    Physical activity     Days per week: None     Minutes per session: None    Stress: None   Relationships    Social connections     Talks on phone: None     Gets together: None     Attends Church service: None Active member of club or organization: None     Attends meetings of clubs or organizations: None     Relationship status: None    Intimate partner violence     Fear of current or ex partner: None     Emotionally abused: None     Physically abused: None     Forced sexual activity: None   Other Topics Concern    None   Social History Narrative    None       SCREENINGS                    PHYSICAL EXAM    (up to 7 for level 4, 8 or more for level 5)     ED Triage Vitals [05/11/21 2208]   BP Temp Temp Source Pulse Resp SpO2 Height Weight   (!) 173/79 97.2 °F (36.2 °C) Tympanic 72 15 98 % -- --       Physical Exam  Vitals signs and nursing note reviewed. Constitutional:       General: She is not in acute distress. Appearance: Normal appearance. She is not ill-appearing, toxic-appearing or diaphoretic. HENT:      Head: Normocephalic and atraumatic. Nose: Nose normal.      Mouth/Throat:      Mouth: Mucous membranes are moist.   Eyes:      Pupils: Pupils are equal, round, and reactive to light. Neck:      Musculoskeletal: Normal range of motion. Cardiovascular:      Rate and Rhythm: Normal rate and regular rhythm. Pulmonary:      Effort: Pulmonary effort is normal.      Breath sounds: Normal breath sounds. Abdominal:      General: Abdomen is flat. Palpations: Abdomen is soft. Musculoskeletal: Normal range of motion. Skin:     General: Skin is warm. Capillary Refill: Capillary refill takes less than 2 seconds. Neurological:      General: No focal deficit present. Mental Status: She is alert and oriented to person, place, and time.          DIAGNOSTIC RESULTS     EKG: All EKG's are interpreted by the Emergency Department Physician who either signs orCo-signs this chart in the absence of a cardiologist.        RADIOLOGY:   plain film images such as CT, Ultrasound and MRI are read by the radiologist. Plain radiographic images are visualized and preliminarily interpreted by the clinically significant/life threatening deterioration in the patient's condition which required my urgent intervention. CONSULTS:  None    PROCEDURES:  Unlessotherwise noted below, none     Procedures    FINAL IMPRESSION      1. Chest pain at rest    2. Personal history of coronary artery disease          DISPOSITION/PLAN   DISPOSITION Decision To Admit 05/12/2021 01:37:26 AM      PATIENT REFERRED TO:  No follow-up provider specified.     DISCHARGE MEDICATIONS:  New Prescriptions    No medications on file              (Please note that portions of this note were completed with a voice recognition program.  Efforts were made to edit the dictations but occasionally words are mis-transcribed.)      Cassidy Wheat MD (electronically signed)  Attending Emergency Physician            Cassidy Wheat MD  05/12/21 4850

## 2021-05-12 NOTE — PROGRESS NOTES
Met with Patient this a.m. to discuss discharge planning. Patient is a 63year old , white female, admitted with a diagnosis of chest pain in adult. Patient is alert and oriented, pleasant and cooperative with this assessment. States that she wishes to return home with her  when she is deemed medically stable. Patient resides in [facility] with her spouse. She uses no DME and currently has no outside resources or services in place. Patient is independent with all ADL's. She is active in her lifestyle, per her own report. Patient drives herself and provides for her own transportation needs. PCP is Dr. Dajuan Hernandez. Patient states that she has insurance to cover the cost of her prescriptions and denies needing further assistance with this expense. Discharge plan is home when stable. No additional services requested by Patient at this time. Patient is a 'Full Code' status and voices no interest in executing Advanced Directive documents at this point. LSW to remain available as needed to assist with discharge planning needs as they arise.     [address]  5/12/2021

## 2021-05-12 NOTE — PROGRESS NOTES
Patient arrived to floor via wheel chair. Able to ambulate to bed with assistance. Patient complains of dizziness that started with her chest pain prior to admission. Patient stated that it will stop once she gets settled in. Denies any pain, chest pain, SOB, numbness or tingling. Patient stated that she does have MS. Admission information completed at this time. Patient aware of plan of care and that she is NPO for morning testing. Patient placed on telemetry. Denies any other needs at this time. Will continue to monitor.

## 2021-05-12 NOTE — ED NOTES
Called Dr Elver Torres via cell phone, connected call to Dr Joann Merchant.       Lee Fuentes  05/12/21 0134

## 2021-05-12 NOTE — DISCHARGE SUMMARY
Discharge Summary    Ruthann Reno  :  1960  MRN:  [id number]    Admit date:  2021      Discharge date: 2021     Admitting Physician:  Ilan Mckeon MD    Discharge Diagnoses:    Principal Problem:    Chest pain in adult  Active Problems:    HTN (hypertension)    MS (multiple sclerosis) ([facility] [address]    Type 2 diabetes mellitus without complication, without long-term current use of insulin ([facility] [address]  Resolved Problems:    * No resolved hospital problems. *      Hospital Course:   Ruthann Reno is a 63 y.o. female admitted with pain. She presented to the ER yesterday with chest discomfort which started that evening. The discomfort has resolved. She describes the discomfort as Pressure located at the epigastric region area and rates the pain at about a 7/10 in severity. She denied radiation of pain. She also complains of chest pressure/discomfort and Heartburn. She stated that she felt like her heart was skipping beats at times. She also stated that she felt like her heart rate increased when sitting and that is when the symptoms began. She states she does have a history of SVT but that it has been controlled. She stated that she also has tinnitus always due to history of Ménière's disease but stated that when this event was occurring that the ringing in her ears was very loud. Stated her last stress test was approximately 3 years ago when she had a cardiac stents placed. She stated that she does drink approximately 4 cups of coffee a day. She denies dyspnea, exertional chest pressure/discomfort, fatigue, lower extremity edema and syncope, fever, chills, abdominal pain, nausea, vomiting, diarrhea and constipation. She does have a previous history of known coronary artery disease. Cardiac risk factors include: prior CAD / MI, prior coronary PCI / stent, prior abnormal stress test / heart cath, diabetes mellitus, hypertension, hyperlipidemia and family history of CAD.   She was admitted and a stress test was completed. Results were low risk. I do feel at this time her chest discomfort is more related to GERD and will start her on Prilosec 20 mg daily. She will follow-up with her primary care as an outpatient. She has had no further chest pain since admission. Consultants:  none    Procedures: Stress Test    Complications: none    Discharge Condition: good    Exam:  GEN:  alert and oriented to person, place and time, well-developed and well-nourished, in no acute distress  EYES:  No gross abnormalities. , PERRL and EOMI  NECK: normal, supple, no lymphadenopathy,  no carotid bruits  PULM: clear to auscultation bilaterally- no wheezes, rales or rhonchi, normal air movement, no respiratory distress  COR:   regular rate & rhythm, no murmurs, no gallops, S1 normal and S2 normal  ABD:    soft, non-tender, non-distended, normal bowel sounds, no masses or organomegaly  EXT:    no cyanosis, clubbing or edema present    NEURO: follows commands, GRULLON, no deficits  SKIN:   no rashes or significant lesions    Significant Diagnostic Studies:   Lab Results   Component Value Date    WBC 6.6 05/11/2021    HGB 12.8 05/11/2021     05/11/2021       Lab Results   Component Value Date    BUN 24 (H) 05/11/2021    CREATININE 1.19 (H) 05/11/2021     05/11/2021    K 4.1 05/11/2021    CALCIUM 9.7 05/11/2021    CL 98 05/11/2021    CO2 29 05/11/2021    LABGLOM 46 (L) 05/11/2021       Lab Results   Component Value Date    WBCUA 0 TO 2 05/29/2019    RBCUA 0 TO 2 05/29/2019    EPITHUA 5 TO 10 05/29/2019    LEUKOCYTESUR SMALL (A) 05/29/2019    SPECGRAV <1.005 (L) 05/29/2019    GLUCOSEU NEGATIVE 05/29/2019    KETUA NEGATIVE 05/29/2019    PROTEINU NEGATIVE 05/29/2019    HGBUR NEGATIVE 05/29/2019    CASTUA NOT REPORTED 05/29/2019    CRYSTUA NOT REPORTED 05/29/2019    BACTERIA TRACE (A) 05/29/2019    YEAST NOT REPORTED 05/29/2019       Xr Chest Portable    Result Date: 5/11/2021  EXAMINATION: ONE XRAY VIEW OF THE CHEST 5/11/2021 10:45 pm COMPARISON: CT chest March 20, 2021 and chest x-ray HISTORY: ORDERING SYSTEM PROVIDED HISTORY: chest pain TECHNOLOGIST PROVIDED HISTORY: chest pain FINDINGS: The lungs are without acute focal process. There is no effusion or pneumothorax. The cardiomediastinal silhouette is without acute process. The osseous structures are without acute process. No acute process.        Assessment and Plan:  Patient Active Problem List    Diagnosis Date Noted    Diabetes type 2, uncontrolled ([facility] [address] 08/08/2012     Priority: High    Chest pain in adult 05/12/2021    Intractable vomiting with nausea 03/20/2021    Type 2 diabetes mellitus without complication, without long-term current use of insulin ([address] 03/20/2021    Coronary artery disease involving native coronary artery of native heart without angina pectoris 03/20/2021    Intractable nausea and vomiting 03/20/2021    Unstable angina (HCC) 12/07/2018    Abnormal cardiovascular stress test 06/28/2018    ACS (acute coronary syndrome) ([address] 06/27/2018    Muscle spasm of left shoulder     Gait difficulty 04/24/2015    MS (multiple sclerosis) ([facility] [address] 11/10/2014    Cerebrovascular disease 12/17/2013    Memory loss 02/16/2013    Gout 11/08/2012    Spasticity 09/06/2012    Multiple sclerosis ([address] 09/06/2012    Dyslipidemia 08/08/2012    Hypothyroid 08/08/2012    HTN (hypertension) 08/08/2012    Obesity (BMI 30-39.9) 08/08/2012    Dermatitis herpetiformis     Multiple sclerosis, relapsing-remitting (HCC)     SVT (supraventricular tachycardia) (HCC)     PAD (peripheral artery disease) ([facility] [address]     MINH (obstructive sleep apnea)         Discharge Medications:       Genna Saxena   Home Medication Instructions JHONY:[id number]    Printed on:05/12/21 [id number]   Medication Information                      aspirin 81 MG EC tablet  Take 1 tablet by mouth daily             atorvastatin (LIPITOR) 40 MG tablet  Take 1 tablet by mouth nightly baclofen (LIORESAL) 20 MG tablet  Take 1 tablet by mouth 4 times daily             clopidogrel (PLAVIX) 75 MG tablet  Take 1 tablet by mouth daily             diazePAM (VALIUM) 10 MG tablet  Take 10 mg by mouth every 8 hours as needed. furosemide (LASIX) 20 MG tablet  Take 20 mg by mouth daily             levothyroxine (SYNTHROID) 200 MCG tablet  Take 1 tablet by mouth Daily             Magnesium 250 MG TABS  Take 500 mg by mouth every morning              meclizine (ANTIVERT) 25 MG tablet  Take 1 tablet by mouth 3 times daily as needed for Dizziness             metformin (GLUCOPHAGE) 1000 MG tablet  Take 1 tablet by mouth daily (with breakfast). metoprolol tartrate (LOPRESSOR) 25 MG tablet  Take 1 tablet by mouth 2 times daily             nitroGLYCERIN (NITROSTAT) 0.4 MG SL tablet  up to max of 3 total doses. If no relief after 1 dose, call 911. NONFORMULARY  Take 1 tablet by mouth daily OTC Potassium supplement             omeprazole (PRILOSEC) 20 MG delayed release capsule  Take 1 capsule by mouth every morning (before breakfast)             ondansetron (ZOFRAN ODT) 4 MG disintegrating tablet  Take 1 tablet by mouth every 8 hours as needed for Nausea or Vomiting             ondansetron (ZOFRAN ODT) 4 MG disintegrating tablet  Take 1 tablet by mouth every 8 hours as needed for Nausea             vitamin D-3 (CHOLECALCIFEROL) 5000 units TABS  Take 1,500 Units by mouth every 7 days Takes 1500mg on Mondays                 Patient Instructions:    Activity: activity as tolerated  Diet: cardiac diet and diabetic diet  Wound Care: none needed  Other: None    Disposition:   Discharge to Home    Follow up:  Patient will be followed by Fei Nielsen DO in 1-2 weeks    CORE MEASURES on Discharge (if applicable)  ACE/ARB in CHF: NA  Statin in MI: NA  ASA in MI: NA  Statin in CVA: NA  Antiplatelet in CVA: NA    Total time spent on discharge services: 35 minutes    Including the following activities:  Evaluation and Management of patient  Discussion with patient and/or surrogate about current care plan  Coordination with Case Management and/or   Coordination of care with Consultants (if applicable)   Coordination of care with Receiving Facility Physician (if applicable)  Completion of DME forms (if applicable)  Preparation of Discharge Summary  Preparation of Medication Reconciliation  Preparation of Discharge Prescriptions    Signed:  MARTÍN Andre, NP-C  5/12/2021, 3:50 PM

## 2021-05-12 NOTE — PROGRESS NOTES
Instructed patient on the benefit and protocol of a Cardiolite/Lexiscan stress test. Voices understanding.

## 2021-05-12 NOTE — ED NOTES
Writer at bedside. Patient updated that troponin redraw will be at [address]. Patient requesting something for nausea and dizziness at this time.       Shakeel Kolb, CHUCKIE  05/11/21 5640

## 2021-05-12 NOTE — PROGRESS NOTES
Pt returned from [address]. Per Chiquita Booth, CNP, may reorder previous diet.  Pt aware

## 2021-05-13 LAB
EKG ATRIAL RATE: 73 BPM
EKG P AXIS: 52 DEGREES
EKG P-R INTERVAL: 144 MS
EKG Q-T INTERVAL: 380 MS
EKG QRS DURATION: 88 MS
EKG QTC CALCULATION (BAZETT): 418 MS
EKG R AXIS: 27 DEGREES
EKG T AXIS: 36 DEGREES
EKG VENTRICULAR RATE: 73 BPM

## 2021-05-13 PROCEDURE — 93010 ELECTROCARDIOGRAM REPORT: CPT | Performed by: INTERNAL MEDICINE

## 2021-05-13 NOTE — PROCEDURES
[address] [facility] [address]                              CARDIAC STRESS TEST    PATIENT NAME: Rupali Nj                   :        1960  MED REC NO:   [id number]                              ROOM:       6086  ACCOUNT NO:   [de-identified]                           ADMIT DATE: 2021  PROVIDER:     Jumana Hoffmann MD    CARDIOVASCULAR DIAGNOSTIC DEPARTMENT    DATE OF STUDY:  2021    ORDERING PROVIDER:  Shona Bernard MD    PRIMARY CARE PROVIDER:  Shabana Ziegler DO    INTERPRETING PHYSICIAN:  Jumana Hoffmann MD    PHARMACOLOGIC MYOCARDIAL PERFUSION STRESS TESTING    Rest/stress single isotope SPECT imaging with exercise stress and gated  SPECT imaging. INDICATIONS:  Assessment of recent chest pain and/or chest discomfort. CLINICAL HISTORY:  The patient is a 61-year-old woman with known  coronary artery disease. Previous cardiac history includes:  CAD, stress test, cardiac  catheterization, PTCA. Other previous history includes:  Chest pain, palpitations, fatigue,  lightheadedness, DM, indigestion, heart burn, hypertension, MS, family  history of CAD in father. Symptoms just prior to testing include:  None. Relevant medications:  Metoprolol. PROCEDURE:  The heart rate was 68 at baseline and kenton to 100 beats per  minute during the regadenoson infusion. The rest blood pressure was  150/80 mm/Hg and decreased to 150/60 mm/Hg. The patient did not  complain of any significant symptoms following infusion. Pharmacologic stress testing was performed with regadenoson at a dose of  0.4 mg. Additionally, low level exercise using slow treadmill walking  was performed along with vasodilator infusion. MYOCARDIAL PERFUSION IMAGING:  Imaging was performed at rest 30-45  minutes following the injection of 10 mCi of sestamibi.   Approximately  10 seconds after Lexiscan injection, the patient was injected with 30 mCi of sestamibi. Gating post-stress tomographic imaging was performed  30-45 minutes after stress. STRESS ECG RESULTS:  The resting electrocardiogram demonstrated normal  sinus rhythm without significant ST-segment abnormalities that may  impair accurate ECG detection of stress induced cardiac ischemia. During vasodilator infusion and during recovery, the patient developed:    No significant ST segment changes suggestive of myocardial ischemia with  no premature atrial contractions (PACs) and no premature ventricular  contractions (PVCs). NUCLEAR IMAGING RESULTS:  The overall quality of the study is excellent. Mild attenuation artifact was seen. There is no evidence of abnormal  lung uptake. Additionally, the right ventricle appears normal.  The  left ventricular cavity is noted to be normal in size on the stress  images. There is no evidence of transient ischemic dilatation (TID) of  the left ventricle. Gated SPECT imaging reveals normal myocardial thickening and wall motion  with a calculated left ventricular ejection fraction of 75%. The rest images demonstrate homogeneous tracer distribution throughout  the myocardium. On stress imaging, a small perfusion abnormality of mild intensity in  the anterior region, which is most likely due to artifact. IMPRESSION:  1. Largely normal myocardial perfusion imaging with soft tissue  artifact, but without significant evidence of myocardial ischemia or  infarction. 2.  Global left ventricular systolic function was normal without  regional wall motion abnormalities. 3.  No significant electrocardiographic evidence of myocardial ischemia  during EKG monitoring without significant associated arrhythmias. Overall, these results are most consistent with a low risk for  significant coronary artery disease. The sensitivity for detecting ischemia on this test may have been  reduced due to the patient being on a beta blocker.         Nnamdi Barrios

## 2021-08-18 NOTE — H&P
Internal Medicine History and Physical    Patient:  Harinder Banda  MRN: [id number]    Chief Complaint:  Chest pain    History Obtained From:  patient, electronic medical record  PCP: Soy Reno DO    History of Present Illness: The patient is a 63 y.o. female who presented to the ER yesterday with chest discomfort which started that evening. The discomfort has resolved. She describes the discomfort as Pressure located at the epigastric region area and rates the pain at about a 7/10 in severity. She denied radiation of pain. She also complains of chest pressure/discomfort and Heartburn. She stated that she felt like her heart was skipping beats at times. She also stated that she felt like her heart rate increased when sitting and that is when the symptoms began. She states she does have a history of SVT but that it has been controlled. She stated that she also has tinnitus always due to history of Ménière's disease but stated that when this event was occurring that the ringing in her ears was very loud. Stated her last stress test was approximately 3 years ago when she had a cardiac stents placed. She stated that she does drink approximately 4 cups of coffee a day. She denies dyspnea, exertional chest pressure/discomfort, fatigue, lower extremity edema and syncope, fever, chills, abdominal pain, nausea, vomiting, diarrhea and constipation. She does have a previous history of known coronary artery disease.     Cardiac risk factors include: prior CAD / MI, prior coronary PCI / stent, prior abnormal stress test / heart cath, diabetes mellitus, hypertension, hyperlipidemia and family history of CAD    Past Medical History:        Diagnosis Date    Alveolar hypoventilation     Dermatitis herpetiformis     GERD (gastroesophageal reflux disease)     constipation, sensitive to glutens    Gout     Hearing loss     Hyperlipidemia     Hypertension     Hypothyroidism     [facility]bid [facility] [address]     Multiple sclerosis [facility] [address]     Neuropathy     MINH (obstructive sleep apnea)     intolerant of cpap    [facility]AD [facility]) [facility])     SVT [facility]supraventricular tachycardia) ([facility])     Type II or unspecified type diabetes mellitus without mention of complication, not stated as uncontrolled     Vertigo        Past Surgical History:        Procedure Laterality Date    CARDIAC CATHETERIZATION Left 2018    right radial/[facility]/Dr Polladr    CARPAL TUNNEL RELEASE      left    CERVICAL DISC SURGERY       SECTION          DILATION AND CURETTAGE OF UTERUS      HYSTERECTOMY      cervical dysplasia       Medications Prior to Admission:    Prior to Admission medications    Medication Sig Start Date End Date Taking? Authorizing Provider   meclizine (ANTIVERT) 25 MG tablet Take 1 tablet by mouth 3 times daily as needed for Dizziness 21  Yes Idalmis Branham MD   baclofen (LIORESAL) 20 MG tablet Take 1 tablet by mouth 4 times daily 3/21/21  Yes Andre Sullivan MD   ondansetron (ZOFRAN ODT) 4 MG disintegrating tablet Take 1 tablet by mouth every 8 hours as needed for Nausea or Vomiting 3/21/21  Yes Andre Sullivan MD   NONFORMULARY Take 1 tablet by mouth daily OTC Potassium supplement   Yes Historical Provider, MD   vitamin D-3 (CHOLECALCIFEROL) 5000 units TABS Take 1,500 Units by mouth every 7 days Takes 1500mg on    Yes Historical Provider, MD   aspirin 81 MG EC tablet Take 1 tablet by mouth daily 18  Yes Rachna Brandt MD   metoprolol tartrate (LOPRESSOR) 25 MG tablet Take 1 tablet by mouth 2 times daily  Patient taking differently: Take 25 mg by mouth daily  18  Yes Rachna Brandt MD   clopidogrel (PLAVIX) 75 MG tablet Take 1 tablet by mouth daily 18  Yes Rachna Brandt MD   furosemide (LASIX) 20 MG tablet Take 20 mg by mouth daily   Yes Historical Provider, MD   nitroGLYCERIN (NITROSTAT) 0.4 MG SL tablet up to max of 3 total doses.  If no relief after 1 dose, call 911. 6/28/18  Yes Shonna Chan MD   levothyroxine (SYNTHROID) 200 MCG tablet Take 1 tablet by mouth Daily 4/10/18  Yes Kelly Thibodeaux MD   metformin (GLUCOPHAGE) 1000 MG tablet Take 1 tablet by mouth daily (with breakfast). Patient taking differently: Take 500 mg by mouth 2 times daily (with meals) Indications: resume after repeating bun/cr if normal 1000mg in AM, 500mg at dinner 3/20/13  Yes Peggi Ros, APRN - CNP   Magnesium 250 MG TABS Take 500 mg by mouth every morning    Yes Historical Provider, MD   diazePAM (VALIUM) 10 MG tablet Take 10 mg by mouth every 8 hours as needed. Historical Provider, MD   ondansetron (ZOFRAN ODT) 4 MG disintegrating tablet Take 1 tablet by mouth every 8 hours as needed for Nausea 3/30/21   Kole Snowden MD   atorvastatin (LIPITOR) 40 MG tablet Take 1 tablet by mouth nightly 12/8/18   Sarah Velasquez MD       Allergies:  Bee venom, Erythromycin, and Janumet [sitagliptin-metformin hcl er]    Social History:   TOBACCO:   reports that she has never smoked. She has never used smokeless tobacco.  ELICIT DRUG USE:    Social History     Substance and Sexual Activity   Drug Use No     ETOH:   reports no history of alcohol use. Family History:       Problem Relation Age of Onset    Diabetes Mother     High Cholesterol Mother     Hypertension Mother     Heart Disease Father     Hypertension Father     Other Father         abdominal aneurysm    Mult Sclerosis Sister     Diabetes Maternal Grandmother     Diabetes Maternal Grandfather     Colon Cancer Paternal Grandmother     Other Paternal Grandfather         brain aneurysm    Colon Polyps Other         Aunts with colon polyps       Review of Systems:  Constitutional:negative  for fevers, and negative for chills.   Respiratory: negative for shortness of breath, negative for cough, and negative for wheezing  Cardiovascular: Positive for chest pain, negative for palpitations, and negative for syncope  Gastrointestinal: negative for abdominal pain, negative for nausea,negative for vomiting, negative for diarrhea, negative for constipation, and negative for hematochezia or melena  Genitourinary: negative for dysuria, negative for urinary urgency, negative for urinary frequency, and negative for hematuria  Neurological: negative for unilateral weakness, numbness or tingling. All other systems were reviewed with the patient and are negative except as stated    Objective:    Vitals:   Vitals:    05/12/21 0745   BP: 137/76   Pulse: 77   Resp: 16   Temp: 98 °F (36.7 °C)   SpO2: 98%     Weight: 198 lb (89.8 kg)   Height: 5' 5\" (165.1 cm)   -----------------------------------------------------------------  Exam:  GEN:  alert and oriented to person, place and time, well-developed and well-nourished, in no acute distress  EYES: No gross abnormalities. , PERRL and EOMI  NECK: normal, supple, no lymphadenopathy,  no carotid bruits  PULM: clear to auscultation bilaterally- no wheezes, rales or rhonchi, normal air movement, no respiratory distress  COR: regular rate & rhythm, no murmurs, no gallops, S1 normal and S2 normal  ABD:  soft, non-tender, non-distended, normal bowel sounds, no masses or organomegaly  EXT:   no cyanosis, clubbing or edema present    NEURO: follows commands, GRULL[place], no deficits  SKIN:  no rashes or significant lesions  -----------------------------------------------------------------  Diagnostic Data:   · All diagnostic data was reviewed  Lab Results   Component Value Date    BUN 24 (H) 05/11/2021    CREATININE 1.19 (H) 05/11/2021     05/11/2021    K 4.1 05/11/2021    CALCIUM 9.7 05/11/2021    CL 98 05/11/2021    CO2 29 05/11/2021     Lab Results   Component Value Date    MYOGLOBIN 45 04/22/2015    TROPONINT NOT REPORTED 05/12/2021    CKTOTAL 126 06/22/2018    CKMB 1.9 06/22/2018    PROBNP 265 03/20/2021     Lab Results   Component Value Date    GLUCOSE 232 (H) 05/11/2021     Lab Results Component Value Date    CHOL 202 (H) 12/07/2018    LDLCHOLESTEROL 87 12/07/2018    LDLCALC 79 02/18/2013    HDL 80 12/07/2018    TRIG 177 (H) 12/07/2018    ALT 10 05/11/2021    AST 12 05/11/2021     Lab Results   Component Value Date    WBC 6.6 05/11/2021    HGB 12.8 05/11/2021    MCV 92.9 05/11/2021     05/11/2021     Lab Results   Component Value Date    WBCUA 0 TO 2 05/29/2019    RBCUA 0 TO 2 05/29/2019    EPITHUA 5 TO 10 05/29/2019    LEUKOCYTESUR SMALL (A) 05/29/2019    SPECGRAV <1.005 (L) 05/29/2019    GLUCOSEU NEGATIVE 05/29/2019    KETUA NEGATIVE 05/29/2019    PROTEINU NEGATIVE 05/29/2019    HGBUR NEGATIVE 05/29/2019    CASTUA NOT REPORTED 05/29/2019    CRYSTUA NOT REPORTED 05/29/2019    BACTERIA TRACE (A) 05/29/2019    YEAST NOT REPORTED 05/29/2019     XR CHEST PORTABLE   Final Result   No acute process. Assessment:      · Chest pain / unstable angina  Principal Problem:    Chest pain in adult  Active Problems:    HTN (hypertension)    MS (multiple sclerosis) (HCC)    Type 2 diabetes mellitus without complication, without long-term current use of insulin (HCC)  Resolved Problems:    * No resolved hospital problems.  *      Patient Active Problem List    Diagnosis Date Noted    Diabetes type 2, uncontrolled ([facility] [address] 08/08/2012     Priority: High    Chest pain in adult 05/12/2021    Intractable vomiting with nausea 03/20/2021    Type 2 diabetes mellitus without complication, without long-term current use of insulin ([address] 03/20/2021    Coronary artery disease involving native coronary artery of native heart without angina pectoris 03/20/2021    Intractable nausea and vomiting 03/20/2021    Unstable angina (HCC) 12/07/2018    Abnormal cardiovascular stress test 06/28/2018    ACS (acute coronary syndrome) ([address] 06/27/2018    Muscle spasm of left shoulder     Gait difficulty 04/24/2015    MS (multiple sclerosis) ([facility] [address] 11/10/2014    Cerebrovascular disease 12/17/2013    Memory loss 02/16/2013    Gout 11/08/2012    Spasticity 09/06/2012    Multiple sclerosis ([facility] [address] 09/06/2012    Dyslipidemia 08/08/2012    Hypothyroid 08/08/2012    HTN (hypertension) 08/08/2012    Obesity (BMI 30-39.9) 08/08/2012    Dermatitis herpetiformis     Multiple sclerosis, relapsing-remitting (HCC)     SVT (supraventricular tachycardia) (HCC)     PAD (peripheral artery disease) (HCC)     MINH (obstructive sleep apnea)        Plan:     · This patient requires overnight observation on a telemetry monitored bed due to chest pain / unstable angina  · Factors affecting the medical complexity of this patient include type 2 diabetes, MS, obesity, hypertension, CAD  · Estimated length of stay is 1 day  · Discussed patient's symptoms and data results including labs and imaging studies with the ER MD at time of admission  · Monitor serial cardiac enzymes  · continue aspirin therapy  · Lovenox for DVT prophylaxis  · Will order Lexican stress test with nuclear imaging as an inpatient today  · If stress test is negative the patient will be discharged with close f/u in next 1-2 weeks with PCP  · High risk drug monitoring: None    CORE MEASURES  DVT prophylaxis: Lovenox  Decubitus ulcer present on admission: No  CODE STATUS: FULL CODE  Nutrition Status: good   Physical therapy: No   Old Charts reviewed: Yes  EKG Reviewed:  Yes  Advance Directive Addressed: Yes    MARTÍN Baker, NP-C  5/12/2021, 8:48 AM no

## 2021-11-30 ENCOUNTER — HOSPITAL ENCOUNTER (EMERGENCY)
Age: 61
Discharge: HOME | End: 2021-11-30
Attending: EMERGENCY MEDICINE
Payer: MEDICARE

## 2021-11-30 VITALS
WEIGHT: 198 LBS | HEIGHT: 65 IN | HEART RATE: 80 BPM | OXYGEN SATURATION: 97 % | SYSTOLIC BLOOD PRESSURE: 125 MMHG | BODY MASS INDEX: 32.99 KG/M2 | RESPIRATION RATE: 13 BRPM | TEMPERATURE: 100.8 F | DIASTOLIC BLOOD PRESSURE: 60 MMHG

## 2021-11-30 DIAGNOSIS — R55 SYNCOPE AND COLLAPSE: ICD-10-CM

## 2021-11-30 DIAGNOSIS — R00.2 PALPITATIONS: ICD-10-CM

## 2021-11-30 DIAGNOSIS — U07.1 COVID-19 VIRUS INFECTION: Primary | ICD-10-CM

## 2021-11-30 LAB
ABSOLUTE EOS #: 0 K/UL (ref 0–0.4)
ABSOLUTE IMMATURE GRANULOCYTE: ABNORMAL K/UL (ref 0–0.3)
ABSOLUTE LYMPH #: 0.3 K/UL (ref 1–4.8)
ABSOLUTE MONO #: 0.3 K/UL (ref 0–1)
ALBUMIN SERPL-MCNC: 3.7 G/DL (ref 3.5–5.2)
ALBUMIN/GLOBULIN RATIO: ABNORMAL (ref 1–2.5)
ALP BLD-CCNC: 83 U/L (ref 35–104)
ALT SERPL-CCNC: 9 U/L (ref 5–33)
ANION GAP SERPL CALCULATED.3IONS-SCNC: 11 MMOL/L (ref 9–17)
AST SERPL-CCNC: 17 U/L
BASOPHILS # BLD: 0 % (ref 0–2)
BASOPHILS ABSOLUTE: 0 K/UL (ref 0–0.2)
BILIRUB SERPL-MCNC: 0.24 MG/DL (ref 0.3–1.2)
BUN BLDV-MCNC: 13 MG/DL (ref 6–20)
BUN/CREAT BLD: ABNORMAL (ref 9–20)
CALCIUM SERPL-MCNC: 8.6 MG/DL (ref 8.6–10.4)
CHLORIDE BLD-SCNC: 97 MMOL/L (ref 98–107)
CO2: 24 MMOL/L (ref 20–31)
CREAT SERPL-MCNC: 1 MG/DL (ref 0.5–0.9)
DIFFERENTIAL TYPE: YES
EKG ATRIAL RATE: 76 BPM
EKG P AXIS: 65 DEGREES
EKG P-R INTERVAL: 126 MS
EKG Q-T INTERVAL: 366 MS
EKG QRS DURATION: 84 MS
EKG QTC CALCULATION (BAZETT): 411 MS
EKG R AXIS: 62 DEGREES
EKG T AXIS: 71 DEGREES
EKG VENTRICULAR RATE: 76 BPM
EOSINOPHILS RELATIVE PERCENT: 0 % (ref 0–5)
GFR AFRICAN AMERICAN: >60 ML/MIN
GFR NON-AFRICAN AMERICAN: 57 ML/MIN
GFR SERPL CREATININE-BSD FRML MDRD: ABNORMAL ML/MIN/{1.73_M2}
GFR SERPL CREATININE-BSD FRML MDRD: ABNORMAL ML/MIN/{1.73_M2}
GLUCOSE BLD-MCNC: 343 MG/DL (ref 70–99)
HCT VFR BLD CALC: 38 % (ref 36–46)
HEMOGLOBIN: 13 G/DL (ref 12–16)
IMMATURE GRANULOCYTES: ABNORMAL %
LYMPHOCYTES # BLD: 10 % (ref 15–40)
MCH RBC QN AUTO: 28.6 PG (ref 26–34)
MCHC RBC AUTO-ENTMCNC: 34.2 G/DL (ref 31–37)
MCV RBC AUTO: 83.6 FL (ref 80–100)
MONOCYTES # BLD: 9 % (ref 4–8)
NRBC AUTOMATED: ABNORMAL PER 100 WBC
PDW BLD-RTO: 13.5 % (ref 12.1–15.2)
PLATELET # BLD: 102 K/UL (ref 140–450)
PLATELET ESTIMATE: ABNORMAL
PMV BLD AUTO: ABNORMAL FL (ref 6–12)
POTASSIUM SERPL-SCNC: 4.2 MMOL/L (ref 3.7–5.3)
RBC # BLD: 4.55 M/UL (ref 4–5.2)
RBC # BLD: ABNORMAL 10*6/UL
SARS-COV-2, RAPID: DETECTED
SEG NEUTROPHILS: 81 % (ref 47–75)
SEGMENTED NEUTROPHILS ABSOLUTE COUNT: 3 K/UL (ref 2.5–7)
SODIUM BLD-SCNC: 132 MMOL/L (ref 135–144)
SPECIMEN DESCRIPTION: ABNORMAL
TOTAL PROTEIN: 6.8 G/DL (ref 6.4–8.3)
TROPONIN INTERP: NORMAL
TROPONIN T: NORMAL NG/ML
TROPONIN, HIGH SENSITIVITY: 10 NG/L (ref 0–14)
WBC # BLD: 3.7 K/UL (ref 3.5–11)
WBC # BLD: ABNORMAL 10*3/UL

## 2021-11-30 PROCEDURE — 93010 ELECTROCARDIOGRAM REPORT: CPT | Performed by: INTERNAL MEDICINE

## 2021-11-30 PROCEDURE — C9803 HOPD COVID-19 SPEC COLLECT: HCPCS

## 2021-11-30 PROCEDURE — 87635 SARS-COV-2 COVID-19 AMP PRB: CPT

## 2021-11-30 PROCEDURE — 2580000003 HC RX 258: Performed by: EMERGENCY MEDICINE

## 2021-11-30 PROCEDURE — 93005 ELECTROCARDIOGRAM TRACING: CPT | Performed by: EMERGENCY MEDICINE

## 2021-11-30 PROCEDURE — 99284 EMERGENCY DEPT VISIT MOD MDM: CPT

## 2021-11-30 PROCEDURE — 85025 COMPLETE CBC W/AUTO DIFF WBC: CPT

## 2021-11-30 PROCEDURE — 84484 ASSAY OF TROPONIN QUANT: CPT

## 2021-11-30 PROCEDURE — 80053 COMPREHEN METABOLIC PANEL: CPT

## 2021-11-30 RX ORDER — 0.9 % SODIUM CHLORIDE 0.9 %
1000 INTRAVENOUS SOLUTION INTRAVENOUS ONCE
Status: COMPLETED | OUTPATIENT
Start: 2021-11-30 | End: 2021-11-30

## 2021-11-30 RX ADMIN — SODIUM CHLORIDE 1000 ML: 9 INJECTION, SOLUTION INTRAVENOUS at 02:04

## 2021-11-30 NOTE — ED PROVIDER NOTES
[address] COMPLAINT    Chief Complaint   Patient presents with    Other     Pt states her heart was racing with chest pressure. She states she took a Nitro, passed out, & her  called [phone]. Pt denies injury. HPI    Frankie Palmer is a 63 y.o. female who presentsto ED from home. By EMS  With complaint of syncope. Onset prior to arrival.  Patient states that she felt palpitations, rapid heart rate. Patient has some chest tightness. Patient took nitroglycerin and \"passed out\". At the time of exam in ED patient feels better. Patient is chest pain-free. Patient has history of coronary artery disease. Patient had coronary artery stent placements in 2019.     PAST MEDICAL HISTORY    Past Medical History:   Diagnosis Date    Alveolar hypoventilation     Dermatitis herpetiformis     GERD (gastroesophageal reflux disease)     constipation, sensitive to glutens    Gout     Hearing loss     Hyperlipidemia     Hypertension     Hypothyroidism     Morbid obesity ([facility] [address]     Multiple sclerosis (HCC)     Neuropathy     MINH (obstructive sleep apnea)     intolerant of cpap    PAD (peripheral artery disease) (HCC)     [facility]     Type II or unspecified type diabetes mellitus without mention of complication, not stated as uncontrolled     Vertigo        SURGICAL HISTORY    Past Surgical History:   Procedure Laterality Date    CARDIAC CATHETERIZATION Left 2018    right radial/[facility]Dr Pollard    CARPAL TUNNEL RELEASE      left    CERVICAL DISC SURGERY       SECTION          CORONARY ANGIOPLASTY WITH STENT PLACEMENT      2 stents     DILATION AND CURETTAGE OF UTERUS      HYSTERECTOMY  2006    cervical dysplasia       CURRENT MEDICATIONS    Current Outpatient Rx   Medication Sig Dispense Refill    omeprazole (PRILOSEC) 20 MG delayed release capsule Take 1 capsule by mouth every morning (before Only    Janumet [Sitagliptin-Metformin Hcl Er] Hives and Other (See Comments)     Throat pain and problems swallowing    Other      Benadryl & Codeine        FAMILY HISTORY    Family History   Problem Relation Age of Onset    Diabetes Mother     High Cholesterol Mother     Hypertension Mother     Heart Disease Father     Hypertension Father     Other Father         abdominal aneurysm    Mult Sclerosis Sister     Diabetes Maternal Grandmother     Diabetes Maternal Grandfather     Colon Cancer Paternal Grandmother     Other Paternal Grandfather         brain aneurysm    Colon Polyps Other         Aunts with colon polyps       SOCIAL HISTORY    Social History     Socioeconomic History    Marital status:      Spouse name: None    Number of children: None    Years of education: None    Highest education level: None   Occupational History    Occupation:      Comment: not working due to medical problems   Tobacco Use    Smoking status: Never Smoker    Smokeless tobacco: Never Used   Vaping Use    Vaping Use: Never used   Substance and Sexual Activity    Alcohol use: No     Comment: occ    Drug use: No    Sexual activity: Yes     Partners: Male   Other Topics Concern    None   Social History Narrative    None     Social Determinants of Health     Financial Resource Strain:     Difficulty of Paying Living Expenses: Not on file   Food Insecurity:     Worried About Running Out of Food in the Last Year: Not on file    [place] of Food in the Last Year: Not on file   Transportation Needs:     Lack of Transportation (Medical): Not on file    Lack of Transportation (Non-Medical):  Not on file   Physical Activity:     Days of Exercise per Week: Not on file    Minutes of Exercise per Session: Not on file   Stress:     Feeling of Stress : Not on file   Social Connections:     Frequency of Communication with Friends and Family: Not on file    Frequency of Social Gatherings with Friends and Family: Not on file    Attends Sabianist Services: Not on file    Active Member of Clubs or Organizations: Not on file    Attends Club or Organization Meetings: Not on file    Marital Status: Not on file   Intimate Partner Violence:     Fear of Current or Ex-Partner: Not on file    Emotionally Abused: Not on file    Physically Abused: Not on file    Sexually Abused: Not on file   Housing Stability:     Unable to Pay for Housing in the Last Year: Not on file    Number of [place] in the Last Year: Not on file    Unstable Housing in the Last Year: Not on file           Review of Systems:  Constitutional:  Denies fever, chills, weight loss or weakness   Eyes:  Denies photophobia or discharge   HENT: Positive for flu symptoms last week respiratory:  Denies cough or shortness of breath   Cardiovascular: Positive for chest pain tightness and palpitation GI:  Denies abdominal pain, nausea, vomiting, or diarrhea   Musculoskeletal:  Denies back pain   Skin:  Denies rash   Neurologic:  Denies headache, focal weakness or sensory changes   Endocrine:  Denies polyuria or polydypsia   Lymphatic:  Denies swollen glands   Psychiatric:  Denies depression, suicidal ideation or homicidal ideation   All systems negative except as marked. PHYSICAL EXAM    VITAL SIGNS: /60   Pulse 80   Temp 100.8 °F (38.2 °C) (Oral)   Resp 13   Ht 5' 5\" (1.651 m)   Wt 198 lb (89.8 kg)   SpO2 97%   BMI 32.95 kg/m²    Constitutional: Ill-appearing female in no acute distress with low-grade fever. HENT:  Normocephalic, Atraumatic, Bilateral external ears normal, Oropharynx dry, No oral exudates, Nose normal. Neck- Normal range of motion, No tenderness, Supple, No stridor. Eyes:  PERRL, EOMI, Conjunctiva normal, No discharge. Respiratory:  Normal breath sounds, No respiratory distress, No wheezing, No chest tenderness. Cardiovascular:  Normal heart rate, Normal rhythm, No murmurs, No rubs, No gallops.    GI: Bowel sounds normal, Soft, No tenderness, No masses, No pulsatile masses. : External genitalia appear normal, No masses or lesions. No discharge. No CVA tenderness. Musculoskeletal:  Intact distal pulses, No edema, No tenderness, No cyanosis, No clubbing. Good range of motion in all major joints. No tenderness to palpation or major deformities noted. Back- No tenderness. Integument:  Warm, Dry, No erythema, No rash. Lymphatic:  No lymphadenopathy noted. Neurologic:  Alert & oriented x 3, Normal motor function, Normal sensory function, No focal deficits noted. Psychiatric:  Affect normal, Judgment normal, Mood normal.     EKG    Sinus rhythm rate of 76    RADIOLOGY    No orders to display       PROCEDURES        Labs  Labs Reviewed   COVID-19, RAPID - Abnormal; Notable for the following components:       Result Value    SARS-CoV-2, Rapid DETECTED (*)     All other components within normal limits   CBC WITH AUTO DIFFERENTIAL - Abnormal; Notable for the following components:    Platelets 557 (*)     Seg Neutrophils 81 (*)     Lymphocytes 10 (*)     Monocytes 9 (*)     Absolute Lymph # 0.30 (*)     All other components within normal limits   COMPREHENSIVE METABOLIC PANEL - Abnormal; Notable for the following components:    Glucose 343 (*)     CREATININE 1.00 (*)     Sodium 132 (*)     Chloride 97 (*)     Total Bilirubin 0.24 (*)     GFR Non- 57 (*)     All other components within normal limits   TROPONIN             Summation      Patient Course: She is COVID-19 positive. Patient denies shortness of breath. Patient is chest pain-free in ED. Patient remained asymptomatic in ED. Patient was given IV fluids in ED. Troponin within normal limits.     ED Medications administered this visit:    Medications   0.9 % sodium chloride bolus (0 mLs IntraVENous Stopped 11/30/21 0323)       New Prescriptions from this visit:    Discharge Medication List as of 11/30/2021  3:24 AM          Follow-up: [facility]  [address] [address] [phone]    As needed, If symptoms worsen        Final Impression:   1. COVID-19 virus infection    2. Palpitations    3.  Syncope and collapse               (Please note that portions of this note were completed with a voice recognition program.  Efforts were made to edit the dictations but occasionally words are mis-transcribed.)        Vida Mc MD  11/30/21 7023       Vida Mc MD  12/03/21 9868

## 2021-11-30 NOTE — ED PROVIDER NOTES
[address] COMPLAINT    Chief Complaint   Patient presents with    Other     Pt states her heart was racing with chest pressure. She states she took a Nitro, passed out, & her  called 46. Pt denies injury. LUCI Brandt is a 63 y.o. female who presentsto ED from home. By EMS  With complaint of syncope. Onset prior to arrival.  Patient states that she felt palpitations, rapid heart rate. Patient has some chest tightness. Patient took nitroglycerin and \"passed out\". At the time of exam in ED patient feels better. Patient is chest pain-free. Patient has history of coronary artery disease. Patient had coronary artery stent placements in 2019. Patient states that she had Covid symptoms after COVID-19 exposure last week.   PAST MEDICAL HISTORY    Past Medical History:   Diagnosis Date    Alveolar hypoventilation     Dermatitis herpetiformis     GERD (gastroesophageal reflux disease)     constipation, sensitive to glutens    Gout     Hearing loss     Hyperlipidemia     Hypertension     Hypothyroidism     Morbid obesity ([address]     Multiple sclerosis (HCC)     Neuropathy     MINH (obstructive sleep apnea)     intolerant of cpap    PAD (peripheral artery disease) (HCC)     SV[facility]ra[facility] tachycardia[facility]     Type II or unspecified type diabetes mellitus without mention of complication, not stated as uncontrolled     Vertigo        SURGICAL HISTORY    Past Surgical History:   Procedure Laterality Date    CARDIAC CATHETERIZATION Left 2018    right radial/[facility]/Dr Pollard    CARPAL TUNNEL RELEASE      left    CERVICAL DISC SURGERY       SECTION          DILATION AND CURETTAGE OF UTERUS      HYSTERECTOMY  2006    cervical dysplasia       CURRENT MEDICATIONS    Current Outpatient Rx   Medication Sig Dispense Refill    omeprazole (PRILOSEC) 20 MG delayed release capsule Take 1 capsule by mouth every morning (before breakfast) 90 capsule 1    diazePAM (VALIUM) 10 MG tablet Take 10 mg by mouth every 8 hours as needed.  meclizine (ANTIVERT) 25 MG tablet Take 1 tablet by mouth 3 times daily as needed for Dizziness 20 tablet 0    ondansetron (ZOFRAN ODT) 4 MG disintegrating tablet Take 1 tablet by mouth every 8 hours as needed for Nausea 20 tablet 0    baclofen (LIORESAL) 20 MG tablet Take 1 tablet by mouth 4 times daily 90 tablet 0    ondansetron (ZOFRAN ODT) 4 MG disintegrating tablet Take 1 tablet by mouth every 8 hours as needed for Nausea or Vomiting 15 tablet 0    NONFORMULARY Take 1 tablet by mouth daily OTC Potassium supplement      vitamin D-3 (CHOLECALCIFEROL) 5000 units TABS Take 1,500 Units by mouth every 7 days Takes 1500mg on Mondays      aspirin 81 MG EC tablet Take 1 tablet by mouth daily 30 tablet 3    atorvastatin (LIPITOR) 40 MG tablet Take 1 tablet by mouth nightly 30 tablet 3    metoprolol tartrate (LOPRESSOR) 25 MG tablet Take 1 tablet by mouth 2 times daily (Patient taking differently: Take 25 mg by mouth daily ) 60 tablet 3    clopidogrel (PLAVIX) 75 MG tablet Take 1 tablet by mouth daily 30 tablet 3    furosemide (LASIX) 20 MG tablet Take 20 mg by mouth daily      nitroGLYCERIN (NITROSTAT) 0.4 MG SL tablet up to max of 3 total doses. If no relief after 1 dose, call 911. 25 tablet 3    levothyroxine (SYNTHROID) 200 MCG tablet Take 1 tablet by mouth Daily 30 tablet 1    metformin (GLUCOPHAGE) 1000 MG tablet Take 1 tablet by mouth daily (with breakfast).  (Patient taking differently: Take 500 mg by mouth 2 times daily (with meals) Indications: resume after repeating bun/cr if normal 1000mg in AM, 500mg at dinner) 180 tablet 3    Magnesium 250 MG TABS Take 500 mg by mouth every morning          ALLERGIES    Allergies   Allergen Reactions    Bee Venom      Throat tightens,swelling reddness    Codeine Other (See Comments)    Erythromycin Nausea Only    Janumet [Sitagliptin-Metformin Hcl Er] Hives and Other (See Comments)     Throat pain and problems swallowing    Other      Benadryl & Codeine        FAMILY HISTORY    Family History   Problem Relation Age of Onset    Diabetes Mother     High Cholesterol Mother     Hypertension Mother     Heart Disease Father     Hypertension Father     Other Father         abdominal aneurysm    Mult Sclerosis Sister     Diabetes Maternal Grandmother     Diabetes Maternal Grandfather     Colon Cancer Paternal Grandmother     Other Paternal Grandfather         brain aneurysm    Colon Polyps Other         Aunts with colon polyps       SOCIAL HISTORY    Social History     Socioeconomic History    Marital status:      Spouse name: Not on file    Number of children: Not on file    Years of education: Not on file    Highest education level: Not on file   Occupational History    Occupation:      Comment: not working due to medical problems   Tobacco Use    Smoking status: Never Smoker    Smokeless tobacco: Never Used   Substance and Sexual Activity    Alcohol use: No     Comment: occ    Drug use: No    Sexual activity: Yes     Partners: Male   Other Topics Concern    Not on file   Social History Narrative    Not on file     Social Determinants of Health     Financial Resource Strain:     Difficulty of Paying Living Expenses: Not on file   Food Insecurity:     Worried About Running Out of Food in the Last Year: Not on file    [place] of Food in the Last Year: Not on file   Transportation Needs:     Lack of Transportation (Medical): Not on file    Lack of Transportation (Non-Medical):  Not on file   Physical Activity:     Days of Exercise per Week: Not on file    Minutes of Exercise per Session: Not on file   Stress:     Feeling of Stress : Not on file   Social Connections:     Frequency of Communication with Friends and Family: Not on file    Frequency of Social Gatherings with Friends and Family: Not on file   Albert Attends Yazidi Services: Not on file    Active Member of Clubs or Organizations: Not on file    Attends Club or Organization Meetings: Not on file    Marital Status: Not on file   Intimate Partner Violence:     Fear of Current or Ex-Partner: Not on file    Emotionally Abused: Not on file    Physically Abused: Not on file    Sexually Abused: Not on file   Housing Stability:     Unable to Pay for Housing in the Last Year: Not on file    Number of [place] in the Last Year: Not on file    Unstable Housing in the Last Year: Not on file           Review of Systems:  Constitutional:  Denies fever, chills, weight loss or weakness   Eyes:  Denies photophobia or discharge   HENT:  Denies sore throat or ear pain   Respiratory:  Denies cough or shortness of breath   Cardiovascular:  Denies chest pain, palpitations or swelling   GI:  Denies abdominal pain, nausea, vomiting, or diarrhea   Musculoskeletal:  Denies back pain   Skin:  Denies rash   Neurologic:  Denies headache, focal weakness or sensory changes   Endocrine:  Denies polyuria or polydypsia   Lymphatic:  Denies swollen glands   Psychiatric:  Denies depression, suicidal ideation or homicidal ideation   All systems negative except as marked. PHYSICAL EXAM    VITAL SIGNS: There were no vitals taken for this visit. Constitutional:  Well developed, Well nourished, No acute distress, Non-toxic appearance. HENT:  Normocephalic, Atraumatic, Bilateral external ears normal, Oropharynx moist, No oral exudates, Nose normal. Neck- Normal range of motion, No tenderness, Supple, No stridor. Eyes:  PERRL, EOMI, Conjunctiva normal, No discharge. Respiratory:  Normal breath sounds, No respiratory distress, No wheezing, No chest tenderness. Cardiovascular:  Normal heart rate, Normal rhythm, No murmurs, No rubs, No gallops. GI:  Bowel sounds normal, Soft, No tenderness, No masses, No pulsatile masses.    : External genitalia appear normal, No masses or lesions. No discharge. No CVA tenderness. Musculoskeletal:  Intact distal pulses, No edema, No tenderness, No cyanosis, No clubbing. Good range of motion in all major joints. No tenderness to palpation or major deformities noted. Back- No tenderness. Integument:  Warm, Dry, No erythema, No rash. Lymphatic:  No lymphadenopathy noted. Neurologic:  Alert & oriented x 3, Normal motor function, Normal sensory function, No focal deficits noted. Psychiatric:  Affect normal, Judgment normal, Mood normal.     EKG    ***    RADIOLOGY    No orders to display       PROCEDURES    ***    Labs  Labs Reviewed - No data to display          Summation      Patient Course:     ED Medications administered this visit:  Medications - No data to display    New Prescriptions from this visit:    New Prescriptions    No medications on file       Follow-up:  No follow-up provider specified. Final Impression: No diagnosis found.            (Please note that portions of this note were completed with a voice recognition program.  Efforts were made to edit the dictations but occasionally words are mis-transcribed.)

## 2021-12-01 ENCOUNTER — CARE COORDINATION (OUTPATIENT)
Dept: CARE COORDINATION | Age: 61
End: 2021-12-01

## 2021-12-01 NOTE — CARE COORDINATION
Called, message left on voicemail with my contact information and reason for call.  Will attempt 2nd call 12/2/21 if no return call today

## 2021-12-02 ENCOUNTER — CARE COORDINATION (OUTPATIENT)
Dept: CARE COORDINATION | Age: 61
End: 2021-12-02

## 2021-12-02 NOTE — CARE COORDINATION
Called, message left on voicemail with my contact information and reason for call. This is the 2nd attempt to contact for ED F/U.  If no return call today will close COVID episode

## 2022-04-21 ENCOUNTER — APPOINTMENT (OUTPATIENT)
Dept: GENERAL RADIOLOGY | Age: 62
End: 2022-04-21
Payer: MEDICARE

## 2022-04-21 ENCOUNTER — HOSPITAL ENCOUNTER (EMERGENCY)
Age: 62
Discharge: HOME | End: 2022-04-21
Attending: STUDENT IN AN ORGANIZED HEALTH CARE EDUCATION/TRAINING PROGRAM
Payer: MEDICARE

## 2022-04-21 ENCOUNTER — APPOINTMENT (OUTPATIENT)
Dept: CT IMAGING | Age: 62
End: 2022-04-21
Payer: MEDICARE

## 2022-04-21 VITALS
OXYGEN SATURATION: 97 % | TEMPERATURE: 98.5 F | HEART RATE: 67 BPM | RESPIRATION RATE: 20 BRPM | SYSTOLIC BLOOD PRESSURE: 131 MMHG | DIASTOLIC BLOOD PRESSURE: 45 MMHG

## 2022-04-21 DIAGNOSIS — K21.9 GASTROESOPHAGEAL REFLUX DISEASE WITHOUT ESOPHAGITIS: Primary | ICD-10-CM

## 2022-04-21 DIAGNOSIS — E05.90 HYPERTHYROIDISM: ICD-10-CM

## 2022-04-21 LAB
ABSOLUTE EOS #: 0.12 K/UL (ref 0–0.44)
ABSOLUTE IMMATURE GRANULOCYTE: <0.03 K/UL (ref 0–0.3)
ABSOLUTE LYMPH #: 0.86 K/UL (ref 1.1–3.7)
ABSOLUTE MONO #: 0.49 K/UL (ref 0.1–1.2)
ANION GAP SERPL CALCULATED.3IONS-SCNC: 12 MMOL/L (ref 9–17)
BASOPHILS # BLD: 1 % (ref 0–2)
BASOPHILS ABSOLUTE: 0.06 K/UL (ref 0–0.2)
BUN BLDV-MCNC: 21 MG/DL (ref 8–23)
BUN/CREAT BLD: 22 (ref 9–20)
CALCIUM SERPL-MCNC: 9.1 MG/DL (ref 8.6–10.4)
CHLORIDE BLD-SCNC: 100 MMOL/L (ref 98–107)
CO2: 24 MMOL/L (ref 20–31)
CREAT SERPL-MCNC: 0.94 MG/DL (ref 0.5–0.9)
D-DIMER QUANTITATIVE: 0.77 MG/L FEU (ref 0–0.59)
EKG ATRIAL RATE: 73 BPM
EKG P AXIS: 41 DEGREES
EKG P-R INTERVAL: 128 MS
EKG Q-T INTERVAL: 374 MS
EKG QRS DURATION: 88 MS
EKG QTC CALCULATION (BAZETT): 412 MS
EKG R AXIS: 16 DEGREES
EKG T AXIS: 33 DEGREES
EKG VENTRICULAR RATE: 73 BPM
EOSINOPHILS RELATIVE PERCENT: 2 % (ref 1–4)
GFR AFRICAN AMERICAN: >60 ML/MIN
GFR NON-AFRICAN AMERICAN: >60 ML/MIN
GFR SERPL CREATININE-BSD FRML MDRD: ABNORMAL ML/MIN/{1.73_M2}
GFR SERPL CREATININE-BSD FRML MDRD: ABNORMAL ML/MIN/{1.73_M2}
GLUCOSE BLD-MCNC: 206 MG/DL (ref 70–99)
HCT VFR BLD CALC: 37.6 % (ref 36.3–47.1)
HEMOGLOBIN: 12 G/DL (ref 11.9–15.1)
IMMATURE GRANULOCYTES: 0 %
INR BLD: 1
LYMPHOCYTES # BLD: 13 % (ref 24–43)
MCH RBC QN AUTO: 28.5 PG (ref 25.2–33.5)
MCHC RBC AUTO-ENTMCNC: 31.9 G/DL (ref 28.4–34.8)
MCV RBC AUTO: 89.3 FL (ref 82.6–102.9)
MONOCYTES # BLD: 8 % (ref 3–12)
NRBC AUTOMATED: 0 PER 100 WBC
PDW BLD-RTO: 12.7 % (ref 11.8–14.4)
PLATELET # BLD: 232 K/UL (ref 138–453)
PMV BLD AUTO: 11.9 FL (ref 8.1–13.5)
POTASSIUM SERPL-SCNC: 4.2 MMOL/L (ref 3.7–5.3)
PRO-BNP: 179 PG/ML
PROTHROMBIN TIME: 13 SEC (ref 11.5–14.2)
RBC # BLD: 4.21 M/UL (ref 3.95–5.11)
SARS-COV-2, RAPID: NOT DETECTED
SEG NEUTROPHILS: 76 % (ref 36–65)
SEGMENTED NEUTROPHILS ABSOLUTE COUNT: 4.86 K/UL (ref 1.5–8.1)
SODIUM BLD-SCNC: 136 MMOL/L (ref 135–144)
SPECIMEN DESCRIPTION: NORMAL
TROPONIN, HIGH SENSITIVITY: 6 NG/L (ref 0–14)
TROPONIN, HIGH SENSITIVITY: <6 NG/L (ref 0–14)
TSH SERPL DL<=0.05 MIU/L-ACNC: 0.03 UIU/ML (ref 0.3–5)
WBC # BLD: 6.4 K/UL (ref 3.5–11.3)

## 2022-04-21 PROCEDURE — 80048 BASIC METABOLIC PNL TOTAL CA: CPT

## 2022-04-21 PROCEDURE — 36415 COLL VENOUS BLD VENIPUNCTURE: CPT

## 2022-04-21 PROCEDURE — 84439 ASSAY OF FREE THYROXINE: CPT

## 2022-04-21 PROCEDURE — A4216 STERILE WATER/SALINE, 10 ML: HCPCS | Performed by: NURSE PRACTITIONER

## 2022-04-21 PROCEDURE — 2580000003 HC RX 258: Performed by: NURSE PRACTITIONER

## 2022-04-21 PROCEDURE — 84443 ASSAY THYROID STIM HORMONE: CPT

## 2022-04-21 PROCEDURE — 93010 ELECTROCARDIOGRAM REPORT: CPT | Performed by: INTERNAL MEDICINE

## 2022-04-21 PROCEDURE — C9803 HOPD COVID-19 SPEC COLLECT: HCPCS

## 2022-04-21 PROCEDURE — 2500000003 HC RX 250 WO HCPCS: Performed by: NURSE PRACTITIONER

## 2022-04-21 PROCEDURE — 85379 FIBRIN DEGRADATION QUANT: CPT

## 2022-04-21 PROCEDURE — 96374 THER/PROPH/DIAG INJ IV PUSH: CPT

## 2022-04-21 PROCEDURE — 87635 SARS-COV-2 COVID-19 AMP PRB: CPT

## 2022-04-21 PROCEDURE — 71260 CT THORAX DX C+: CPT

## 2022-04-21 PROCEDURE — 93005 ELECTROCARDIOGRAM TRACING: CPT | Performed by: NURSE PRACTITIONER

## 2022-04-21 PROCEDURE — 84484 ASSAY OF TROPONIN QUANT: CPT

## 2022-04-21 PROCEDURE — 96361 HYDRATE IV INFUSION ADD-ON: CPT

## 2022-04-21 PROCEDURE — 6360000004 HC RX CONTRAST MEDICATION: Performed by: NURSE PRACTITIONER

## 2022-04-21 PROCEDURE — 99285 EMERGENCY DEPT VISIT HI MDM: CPT

## 2022-04-21 PROCEDURE — 83880 ASSAY OF NATRIURETIC PEPTIDE: CPT

## 2022-04-21 PROCEDURE — 85025 COMPLETE CBC W/AUTO DIFF WBC: CPT

## 2022-04-21 PROCEDURE — 85610 PROTHROMBIN TIME: CPT

## 2022-04-21 PROCEDURE — 71045 X-RAY EXAM CHEST 1 VIEW: CPT

## 2022-04-21 RX ORDER — ASCORBIC ACID 500 MG
500 TABLET ORAL DAILY
COMMUNITY

## 2022-04-21 RX ORDER — OMEPRAZOLE 40 MG/1
40 CAPSULE, DELAYED RELEASE ORAL
Qty: 30 CAPSULE | Refills: 0 | Status: ON HOLD | OUTPATIENT
Start: 2022-04-21 | End: 2022-05-29 | Stop reason: HOSPADM

## 2022-04-21 RX ORDER — 0.9 % SODIUM CHLORIDE 0.9 %
1000 INTRAVENOUS SOLUTION INTRAVENOUS ONCE
Status: COMPLETED | OUTPATIENT
Start: 2022-04-21 | End: 2022-04-21

## 2022-04-21 RX ADMIN — IOPAMIDOL 75 ML: 755 INJECTION, SOLUTION INTRAVENOUS at 19:08

## 2022-04-21 RX ADMIN — SODIUM CHLORIDE 999 ML: 9 INJECTION, SOLUTION INTRAVENOUS at 17:41

## 2022-04-21 RX ADMIN — FAMOTIDINE 20 MG: 10 INJECTION INTRAVENOUS at 19:02

## 2022-04-21 ASSESSMENT — PAIN DESCRIPTION - PAIN TYPE: TYPE: ACUTE PAIN

## 2022-04-21 ASSESSMENT — PAIN DESCRIPTION - ORIENTATION: ORIENTATION: LEFT

## 2022-04-21 ASSESSMENT — PAIN - FUNCTIONAL ASSESSMENT: PAIN_FUNCTIONAL_ASSESSMENT: 0-10

## 2022-04-21 ASSESSMENT — PAIN SCALES - GENERAL: PAINLEVEL_OUTOF10: 2

## 2022-04-21 NOTE — ED PROVIDER NOTES
[facility] ED  EMERGENCY DEPARTMENT ENCOUNTER      Pt Name: Rosalba Rubalcava  MRN: [id number]  [place] 1960  Date of evaluation: 4/21/2022  Provider: MARTÍN Martin 8624       Chief Complaint   Patient presents with    Chest Pain     started approx. 40 minutes ago. pain to left chest. given 324 ASA per squad. HISTORY OF PRESENT ILLNESS   (Location/Symptom, Timing/Onset, Context/Setting, Quality, Duration, Modifying Factors, Severity)  Note limiting factors. Rosalba Rubalcava is a 63 y.o. female who presents to the emergency department with complaints of feeling \"shaky\" at about 2:30 PM today. She checked her glucose at home and it was 183. She then noted heart palpitations and developed pressure under her left breast.  Patient states she now has continued pressure that has moved to more substernal and epigastric in location. She denies fever. Reports no bodyaches or chills. No headache or neck pain. No sore throat difficulty swallowing. No cough or difficulty breathing. Denies abdominal pain, vomiting, diarrhea or dysuria. Patient endorses history of multiple sclerosis, diabetes, thyroid disease. She reports she has had 2 stents placed in 2018 in her LAD in [place]. HPI    Nursing Notes were reviewed. REVIEW OF SYSTEMS    (2-9 systems for level 4, 10 or more for level 5)     Review of Systems    Except as noted above the remainder of the review of systems was reviewed and negative.        PAST MEDICAL HISTORY     Past Medical History:   Diagnosis Date    Alveolar hypoventilation     Dermatitis herpetiformis     GERD (gastroesophageal reflux disease)     constipation, sensitive to glutens    Gout     Hearing loss     Hyperlipidemia     Hypertension     Hypothyroidism     Morbid obesity ([address]     Multiple sclerosis (HCC)     Neuropathy     MINH (obstructive sleep apnea)     intolerant of cpap    PAD (peripheral artery disease) ([address]     SVT (supraventricular tachycardia) (HCC)     Type II or unspecified type diabetes mellitus without mention of complication, not stated as uncontrolled     Vertigo          SURGICAL HISTORY       Past Surgical History:   Procedure Laterality Date    CARDIAC CATHETERIZATION Left 2018    right radial/[facility]/Dr Pollard    CARPAL TUNNEL RELEASE      left    CERVICAL DISC SURGERY       SECTION          CORONARY ANGIOPLASTY WITH STENT PLACEMENT      2 stents     DILATION AND CURETTAGE OF UTERUS      HYSTERECTOMY  2006    cervical dysplasia         CURRENT MEDICATIONS       Previous Medications    BACLOFEN (LIORESAL) 20 MG TABLET    Take 1 tablet by mouth 4 times daily    CLOPIDOGREL (PLAVIX) 75 MG TABLET    Take 1 tablet by mouth daily    LEVOTHYROXINE (SYNTHROID) 200 MCG TABLET    Take 1 tablet by mouth Daily    MAGNESIUM 250 MG TABS    Take 250 mg by mouth every morning     MECLIZINE (ANTIVERT) 25 MG TABLET    Take 1 tablet by mouth 3 times daily as needed for Dizziness    METFORMIN (GLUCOPHAGE) 1000 MG TABLET    Take 1 tablet by mouth daily (with breakfast).     METOPROLOL TARTRATE (LOPRESSOR) 25 MG TABLET    Take 1 tablet by mouth 2 times daily    NONFORMULARY    Take 1 tablet by mouth daily OTC Potassium supplement    ONDANSETRON (ZOFRAN ODT) 4 MG DISINTEGRATING TABLET    Take 1 tablet by mouth every 8 hours as needed for Nausea or Vomiting    ONDANSETRON (ZOFRAN ODT) 4 MG DISINTEGRATING TABLET    Take 1 tablet by mouth every 8 hours as needed for Nausea    VITAMIN C (ASCORBIC ACID) 500 MG TABLET    Take 500 mg by mouth daily    VITAMIN D-3 (CHOLECALCIFEROL) 5000 UNITS TABS    Take 1,500 Units by mouth every 7 days Takes 1500mg on        ALLERGIES     Bee venom, Codeine, Erythromycin, Janumet [sitagliptin-metformin hcl er], and Other    FAMILY HISTORY       Family History   Problem Relation Age of Onset    Diabetes Mother     High Cholesterol Mother     Hypertension Mother     Heart Disease Father     Hypertension Father     Other Father         abdominal aneurysm    Mult Sclerosis Sister     Diabetes Maternal Grandmother     Diabetes Maternal Grandfather     Colon Cancer Paternal Grandmother     Other Paternal Grandfather         brain aneurysm    Colon Polyps Other         Aunts with colon polyps          SOCIAL HISTORY       Social History     Socioeconomic History    Marital status:      Spouse name: Not on file    Number of children: Not on file    Years of education: Not on file    Highest education level: Not on file   Occupational History    Occupation:      Comment: not working due to medical problems   Tobacco Use    Smoking status: Never Smoker    Smokeless tobacco: Never Used   Vaping Use    Vaping Use: Never used   Substance and Sexual Activity    Alcohol use: No     Comment: occ    Drug use: No    Sexual activity: Yes     Partners: Male   Other Topics Concern    Not on file   Social History Narrative    Not on file     Social Determinants of Health     Financial Resource Strain:     Difficulty of Paying Living Expenses: Not on file   Food Insecurity:     Worried About [address] in the Last Year: Not on file    [address] in the Last Year: Not on file   Transportation Needs:     Lack of Transportation (Medical): Not on file    Lack of Transportation (Non-Medical):  Not on file   Physical Activity:     Days of Exercise per Week: Not on file    Minutes of Exercise per Session: Not on file   Stress:     Feeling of Stress : Not on file   Social Connections:     Frequency of Communication with Friends and Family: Not on file    Frequency of Social Gatherings with Friends and Family: Not on file    Attends Denominational Services: Not on file    Active Member of Clubs or Organizations: Not on file    Attends Club or Organization Meetings: Not on file    Marital Status: Not on file   Intimate Partner Violence:     Fear of Current or Ex-Partner: Not on file    Emotionally Abused: Not on file    Physically Abused: Not on file    Sexually Abused: Not on file   Housing Stability:     Unable to Pay for Housing in the Last Year: Not on file    Number of Places Lived in the Last Year: Not on file    Unstable Housing in the Last Year: Not on file       SCREENINGS         [place] Coma Scale  Eye Opening: Spontaneous  Best Verbal Response: Oriented  Best Motor Response: Obeys commands  [place]ah Coma Scale Score: 15                     CIWA Assessment  BP: (!) 131/45  Pulse: 67                 PHYSICAL EXAM    (up to 7 for level 4, 8 or more for level 5)     ED Triage Vitals   BP Temp Temp Source Pulse Resp SpO2 Height Weight   04/21/22 1713 04/21/22 1711 04/21/22 1711 04/21/22 1711 04/21/22 1711 04/21/22 1711 -- --   (!) 180/67 98.5 °F (36.9 °C) Tympanic 83 18 97 %         Physical Exam  General: Well-developed, well-nourished, in no apparent distress. HEENT exam: Normocephalic, atraumatic. Pupils equal round and reactive to light and external ocular muscles intact. Posterior pharynx noninjected. Oral airway widely patent. No exudate present. Neck exam: Supple no lymphadenopathy, trachea midline. Chest exam: No audible wheezing. No increased respiratory effort. Heart: Normal heart rate and rhythm. No murmur. Hypertensive. Abdomen: Soft, nontender, nondistended. Back: No midline tenderness. No CVA tenderness. Extremities: Patient moving all extremities or difficulty. Intact distal pulses and sensation. Neurologic: Alert and oriented x3. Able to make informed decisions. Skin exam: Clean dry and intact.   DIAGNOSTIC RESULTS     EKG: All EKG's are interpreted by the Emergency Department Physician who either signs or Co-signs this chart in the absence of a cardiologist.    EKG obtained, reviewed interpreted by myself revealing normal sinus rhythm with a ventricular rate of 73 bpm, MN interval 120 ms, QRS duration 88 ms and corrected  ms. No ST segment elevation or depression identified. No STEMI. RADIOLOGY:   Non-plain film images such as CT, Ultrasound and MRI are read by the radiologist. Plain radiographic images are visualized and preliminarily interpreted by the emergency physician with the below findings:        Interpretation per the Radiologist below, if available at the time of this note:    CT CHEST PULMONARY EMBOLISM W CONTRAST   Final Result   No evidence of pulmonary embolism to the segmental level. Mild hazy opacities may represent interstitial alveolar edema versus mild air   trapping         XR CHEST PORTABLE   Final Result   No acute process. ED BEDSIDE ULTRASOUND:   Performed by ED Physician - none    LABS:  Labs Reviewed   CBC WITH AUTO DIFFERENTIAL - Abnormal; Notable for the following components:       Result Value    Seg Neutrophils 76 (*)     Lymphocytes 13 (*)     Absolute Lymph # 0.86 (*)     All other components within normal limits   BASIC METABOLIC PANEL W/ REFLEX TO MG FOR LOW K - Abnormal; Notable for the following components:    Glucose 206 (*)     CREATININE 0.94 (*)     Bun/Cre Ratio 22 (*)     All other components within normal limits   D-DIMER, QUANTITATIVE - Abnormal; Notable for the following components:    D-Dimer, Quant 0.77 (*)     All other components within normal limits   TSH WITH REFLEX - Abnormal; Notable for the following components:    TSH 0.03 (*)     All other components within normal limits   COVID-19, RAPID   PROTIME-INR   BRAIN NATRIURETIC PEPTIDE   TROPONIN   TROPONIN   T4, FREE       All other labs were within normal range or not returned as of this dictation.     EMERGENCY DEPARTMENT COURSE and DIFFERENTIAL DIAGNOSIS/MDM:   Vitals:    Vitals:    04/21/22 1812 04/21/22 1927 04/21/22 1942 04/21/22 1957   BP: (!) 165/73 (!) 152/57 (!) 142/58 (!) 131/45   Pulse: 72 79 68 67   Resp: 14 20  20   Temp:       TempSrc:       SpO2: 100% 97% 98% 97%           [facility]    Qing Calixto is a 63 y.o. female who presents to the emergency department with complaints of feeling \"shaky\" at about 2:30 PM today. She checked her glucose at home and it was 183. She then noted heart palpitations and developed pressure under her left breast.  Patient states she now has continued pressure that has moved to more substernal and epigastric in location. She denies fever. Reports no bodyaches or chills. No headache or neck pain. No sore throat difficulty swallowing. No cough or difficulty breathing. Denies abdominal pain, vomiting, diarrhea or dysuria. Patient endorses history of multiple sclerosis, diabetes, thyroid disease. She reports she has had 2 stents placed in 2018 in her LAD in [facility]. Exam remarkable for alert, interactive and hydrated 12year-old female in no acute distress. HEENT unremarkable. She has no audible wheezing and no increased respiratory effort. Heart is normal rate and rhythm. Abdomen benign. She is actively moving all extremities without difficulty. Patient given 481 mg chewable aspirins in the ambulance on the way in. Patient endorses pain 3-4 out of 10. IV placed. CBC unremarkable. Chemistries revealed a glucose of 206, creatinine 1.94. Pro time was 13 with INR of 1. . Troponin less than 6. With subsequent troponin less than 6 as well. D-dimer 0.77. TSH 0.03. COVID-19 testing negative    EKG unremarkable    Chest x-ray obtained, reviewed myself and read by radiology without acute findings. CTA of the chest obtained to rule out PE was obtained, reviewed by myself and read by radiology without findings of pulmonary embolism. Mild hazy opacities may represent interstitial alveolar edema versus mild air trapping. Patient was given IV Pepcid. Reassessment showed complete resolution of her discomfort.     Given her negative work-up and resolution of her symptoms I recommended that she stop her Synthroid until given further direction by PCP to restart. Follow-up with PCP this week. Also follow-up with her cardiologist for reevaluation by calling for an appointment on Monday. She is started on omeprazole to use as directed. She is advised to return to the ER for increased pain, fevers, difficulty breathing, vomiting or any new or worsening signs or symptoms. REASSESSMENT            CONSULTS:  None    PROCEDURES:  Unless otherwise noted below, none     Procedures        FINAL IMPRESSION      1. Gastroesophageal reflux disease without esophagitis Inadequately Controlled   2. Hyperthyroidism New Problem         DISPOSITION/PLAN   DISPOSITION Decision To Discharge 04/21/2022 08:46:37 PM      PATIENT REFERRED TO:  Georgette Zayas, [address] [facility]  [phone]    In 2 days  for re-evaluation    Your cardiologist    In 2 days  for re-evaluation      DISCHARGE MEDICATIONS:  New Prescriptions    OMEPRAZOLE (PRILOSEC) 40 MG DELAYED RELEASE CAPSULE    Take 1 capsule by mouth every morning (before breakfast)     Controlled Substances Monitoring:     No flowsheet data found. (Please note that portions of this note were completed with a voice recognition program.  Efforts were made to edit the dictations but occasionally words are mis-transcribed.)    MARTÍN Burnett CNP (electronically signed)  Attending Emergency Physician           MARTÍN Burnett CNP  04/21/22 2131      ========================================    ED attending note:  History:  72-year-old female with prior coronary artery disease and reported stents presents emergency department with a transient episode of palpitations and chest pain. During my interview she was pain-free awake and alert in no distress. Patient states she has a history of SVT and this felt somewhat like that prior episode. She denies any lower extremity edema, orthopnea or shortness of breath.   No prior history of heart failure. Exam:  Constitutional: Vital signs reviewed, alert and no acute distress  HENT: Mucous membranes moist and no sign of trauma  Eyes: Sclera non-icteric and No conjunctival injection  Neck: No stridor, midline trachea  Lungs: No resp distress, non labored breathing  Heart: Regular rate and rhythm  No murmurs  3/4 bilateral radial and DP pulses  Abdomen: Soft, nondistended and Nontender  Extremities: No cyanosis, clubbing or edema to lower extremities  Non tender calves  Neuro: Alert, normally oriented and Sensation grossly intact  Skin: Warm, dry and no erythema  Psych: Normal affect, non agitated    Labs:  Troponin delta unchanged  D-dimer elevated  BNP within normal limits  COVID negative    Imaging:  CT chest without acute PE did note possible interstitial edema versus air trapping    MDM:  72-year-old female with transient episode of palpitations and chest pain. EKG was normal sinus rhythm without any ST elevations or depressions. Troponin was within normal limits x2. I did not see evidence of acute coronary syndrome. The patient's symptoms did improve with treatment for GERD in the emergency department. Of note her history of hypothyroidism and palpitations prompted checking her TSH which was low the normal range. This would suggest that her current regimen of Synthroid is more than she likely needs. She is instructed to hold this and follow-up closely with her primary care doctor for repeat labs and reevaluation. She does not have evidence of a hyperthyroid syndrome at the present time. Of note the patient did had an elevated D-dimer which prompted a CT PE to rule out a blood clot. Fortunately this was without acute PE. It did however note some possible interstitial edema. The patient does not have a history of heart failure and clinically does not have any evidence of fluid overload or active heart failure.   She was educated on this incidental imaging notation and signs and symptoms to monitor for. After examination and review with the patient we agreed to treat her as an outpatient with strict return to ED precautions. She had no questions and agreed to plan. [address] [place] [address] ED  eMERGENCY dEPARTMENT eNCOUnter     Pt Name: Ben Funez  MRN: [id number]  [place] 1960  Date of evaluation: 4/22/22    This visit was performed by both a physician and an APC. I performed all aspects of the MDM as documented.   Electronically signed by Kimberly Wilson DO on 4/22/2022 at 12:57 AM  Attending Emergency  Physician         Kimberly Wilson DO  04/22/22 9259

## 2022-04-22 LAB — THYROXINE, FREE: 1.75 NG/DL (ref 0.93–1.7)

## 2022-05-19 ENCOUNTER — HOSPITAL ENCOUNTER (OUTPATIENT)
Dept: GENERAL RADIOLOGY | Age: 62
Discharge: HOME | End: 2022-05-21
Payer: MEDICARE

## 2022-05-19 ENCOUNTER — HOSPITAL ENCOUNTER (OUTPATIENT)
Age: 62
Discharge: HOME | End: 2022-05-21
Payer: MEDICARE

## 2022-05-19 DIAGNOSIS — M25.531 RIGHT WRIST PAIN: ICD-10-CM

## 2022-05-19 PROCEDURE — 73090 X-RAY EXAM OF FOREARM: CPT

## 2022-05-19 PROCEDURE — 73130 X-RAY EXAM OF HAND: CPT

## 2022-05-19 PROCEDURE — 73110 X-RAY EXAM OF WRIST: CPT

## 2022-05-28 ENCOUNTER — HOSPITAL ENCOUNTER (OUTPATIENT)
Age: 62
Setting detail: OBSERVATION
Discharge: HOME | End: 2022-05-29
Attending: EMERGENCY MEDICINE | Admitting: STUDENT IN AN ORGANIZED HEALTH CARE EDUCATION/TRAINING PROGRAM
Payer: MEDICARE

## 2022-05-28 ENCOUNTER — APPOINTMENT (OUTPATIENT)
Dept: GENERAL RADIOLOGY | Age: 62
End: 2022-05-28
Payer: MEDICARE

## 2022-05-28 DIAGNOSIS — R07.9 CHEST PAIN, UNSPECIFIED TYPE: Primary | ICD-10-CM

## 2022-05-28 PROBLEM — R94.31 ACUTE ELECTROCARDIOGRAM CHANGES: Status: ACTIVE | Noted: 2022-05-28

## 2022-05-28 PROBLEM — R77.8 ELEVATED TROPONIN: Status: ACTIVE | Noted: 2022-05-28

## 2022-05-28 PROBLEM — I16.0 HYPERTENSIVE URGENCY: Status: ACTIVE | Noted: 2022-05-28

## 2022-05-28 LAB
ABSOLUTE EOS #: 0.27 K/UL (ref 0–0.44)
ABSOLUTE IMMATURE GRANULOCYTE: 0.03 K/UL (ref 0–0.3)
ABSOLUTE LYMPH #: 0.79 K/UL (ref 1.1–3.7)
ABSOLUTE MONO #: 0.6 K/UL (ref 0.1–1.2)
ALBUMIN SERPL-MCNC: 4.1 G/DL (ref 3.5–5.2)
ALBUMIN/GLOBULIN RATIO: 1.2 (ref 1–2.5)
ALP BLD-CCNC: 116 U/L (ref 35–104)
ALT SERPL-CCNC: 8 U/L (ref 5–33)
ANION GAP SERPL CALCULATED.3IONS-SCNC: 11 MMOL/L (ref 9–17)
AST SERPL-CCNC: 12 U/L
BASOPHILS # BLD: 1 % (ref 0–2)
BASOPHILS ABSOLUTE: 0.1 K/UL (ref 0–0.2)
BILIRUB SERPL-MCNC: 0.2 MG/DL (ref 0.3–1.2)
BUN BLDV-MCNC: 13 MG/DL (ref 8–23)
BUN/CREAT BLD: 16 (ref 9–20)
CALCIUM SERPL-MCNC: 9.2 MG/DL (ref 8.6–10.4)
CHLORIDE BLD-SCNC: 98 MMOL/L (ref 98–107)
CO2: 27 MMOL/L (ref 20–31)
CREAT SERPL-MCNC: 0.79 MG/DL (ref 0.5–0.9)
EKG ATRIAL RATE: 69 BPM
EKG ATRIAL RATE: 73 BPM
EKG ATRIAL RATE: 76 BPM
EKG P AXIS: 35 DEGREES
EKG P AXIS: 36 DEGREES
EKG P AXIS: 43 DEGREES
EKG P-R INTERVAL: 126 MS
EKG P-R INTERVAL: 128 MS
EKG P-R INTERVAL: 132 MS
EKG Q-T INTERVAL: 382 MS
EKG Q-T INTERVAL: 394 MS
EKG Q-T INTERVAL: 396 MS
EKG QRS DURATION: 88 MS
EKG QRS DURATION: 88 MS
EKG QRS DURATION: 90 MS
EKG QTC CALCULATION (BAZETT): 424 MS
EKG QTC CALCULATION (BAZETT): 429 MS
EKG QTC CALCULATION (BAZETT): 434 MS
EKG R AXIS: 1 DEGREES
EKG R AXIS: 13 DEGREES
EKG R AXIS: 18 DEGREES
EKG T AXIS: 17 DEGREES
EKG T AXIS: 18 DEGREES
EKG T AXIS: 9 DEGREES
EKG VENTRICULAR RATE: 69 BPM
EKG VENTRICULAR RATE: 73 BPM
EKG VENTRICULAR RATE: 76 BPM
EOSINOPHILS RELATIVE PERCENT: 3 % (ref 1–4)
GFR AFRICAN AMERICAN: >60 ML/MIN
GFR NON-AFRICAN AMERICAN: >60 ML/MIN
GFR SERPL CREATININE-BSD FRML MDRD: ABNORMAL ML/MIN/{1.73_M2}
GFR SERPL CREATININE-BSD FRML MDRD: ABNORMAL ML/MIN/{1.73_M2}
GLUCOSE BLD-MCNC: 241 MG/DL (ref 70–99)
HCT VFR BLD CALC: 40.2 % (ref 36.3–47.1)
HEMOGLOBIN: 13.1 G/DL (ref 11.9–15.1)
IMMATURE GRANULOCYTES: 0 %
LYMPHOCYTES # BLD: 9 % (ref 24–43)
MCH RBC QN AUTO: 28.7 PG (ref 25.2–33.5)
MCHC RBC AUTO-ENTMCNC: 32.6 G/DL (ref 28.4–34.8)
MCV RBC AUTO: 88 FL (ref 82.6–102.9)
MONOCYTES # BLD: 7 % (ref 3–12)
NRBC AUTOMATED: 0 PER 100 WBC
PDW BLD-RTO: 13.7 % (ref 11.8–14.4)
PLATELET # BLD: 205 K/UL (ref 138–453)
PMV BLD AUTO: 11.9 FL (ref 8.1–13.5)
POTASSIUM SERPL-SCNC: 3.9 MMOL/L (ref 3.7–5.3)
RBC # BLD: 4.57 M/UL (ref 3.95–5.11)
SEG NEUTROPHILS: 80 % (ref 36–65)
SEGMENTED NEUTROPHILS ABSOLUTE COUNT: 7.25 K/UL (ref 1.5–8.1)
SODIUM BLD-SCNC: 136 MMOL/L (ref 135–144)
THYROXINE, FREE: 0.66 NG/DL (ref 0.93–1.7)
TOTAL PROTEIN: 7.6 G/DL (ref 6.4–8.3)
TROPONIN, HIGH SENSITIVITY: 8 NG/L (ref 0–14)
TROPONIN, HIGH SENSITIVITY: 9 NG/L (ref 0–14)
TROPONIN, HIGH SENSITIVITY: 9 NG/L (ref 0–14)
TSH SERPL DL<=0.05 MIU/L-ACNC: 46.16 UIU/ML (ref 0.3–5)
WBC # BLD: 9 K/UL (ref 3.5–11.3)

## 2022-05-28 PROCEDURE — 93010 ELECTROCARDIOGRAM REPORT: CPT | Performed by: INTERNAL MEDICINE

## 2022-05-28 PROCEDURE — 85025 COMPLETE CBC W/AUTO DIFF WBC: CPT

## 2022-05-28 PROCEDURE — 84484 ASSAY OF TROPONIN QUANT: CPT

## 2022-05-28 PROCEDURE — 6370000000 HC RX 637 (ALT 250 FOR IP): Performed by: STUDENT IN AN ORGANIZED HEALTH CARE EDUCATION/TRAINING PROGRAM

## 2022-05-28 PROCEDURE — 71045 X-RAY EXAM CHEST 1 VIEW: CPT

## 2022-05-28 PROCEDURE — 80053 COMPREHEN METABOLIC PANEL: CPT

## 2022-05-28 PROCEDURE — G0378 HOSPITAL OBSERVATION PER HR: HCPCS

## 2022-05-28 PROCEDURE — 36415 COLL VENOUS BLD VENIPUNCTURE: CPT

## 2022-05-28 PROCEDURE — 6360000002 HC RX W HCPCS: Performed by: STUDENT IN AN ORGANIZED HEALTH CARE EDUCATION/TRAINING PROGRAM

## 2022-05-28 PROCEDURE — 93005 ELECTROCARDIOGRAM TRACING: CPT | Performed by: EMERGENCY MEDICINE

## 2022-05-28 PROCEDURE — 99285 EMERGENCY DEPT VISIT HI MDM: CPT

## 2022-05-28 PROCEDURE — 96372 THER/PROPH/DIAG INJ SC/IM: CPT

## 2022-05-28 PROCEDURE — 6360000002 HC RX W HCPCS: Performed by: EMERGENCY MEDICINE

## 2022-05-28 PROCEDURE — 96374 THER/PROPH/DIAG INJ IV PUSH: CPT

## 2022-05-28 PROCEDURE — 84439 ASSAY OF FREE THYROXINE: CPT

## 2022-05-28 PROCEDURE — 84443 ASSAY THYROID STIM HORMONE: CPT

## 2022-05-28 PROCEDURE — 2580000003 HC RX 258: Performed by: STUDENT IN AN ORGANIZED HEALTH CARE EDUCATION/TRAINING PROGRAM

## 2022-05-28 PROCEDURE — 96375 TX/PRO/DX INJ NEW DRUG ADDON: CPT

## 2022-05-28 PROCEDURE — 94761 N-INVAS EAR/PLS OXIMETRY MLT: CPT

## 2022-05-28 RX ORDER — METOPROLOL SUCCINATE 25 MG/1
25 TABLET, EXTENDED RELEASE ORAL DAILY
Status: DISCONTINUED | OUTPATIENT
Start: 2022-05-28 | End: 2022-05-29 | Stop reason: HOSPADM

## 2022-05-28 RX ORDER — SODIUM CHLORIDE 0.9 % (FLUSH) 0.9 %
5-40 SYRINGE (ML) INJECTION PRN
Status: DISCONTINUED | OUTPATIENT
Start: 2022-05-28 | End: 2022-05-29 | Stop reason: HOSPADM

## 2022-05-28 RX ORDER — SODIUM CHLORIDE 9 MG/ML
25 INJECTION, SOLUTION INTRAVENOUS PRN
Status: DISCONTINUED | OUTPATIENT
Start: 2022-05-28 | End: 2022-05-29 | Stop reason: HOSPADM

## 2022-05-28 RX ORDER — METOPROLOL SUCCINATE 25 MG/1
25 TABLET, EXTENDED RELEASE ORAL 2 TIMES DAILY
Status: DISCONTINUED | OUTPATIENT
Start: 2022-05-28 | End: 2022-05-28

## 2022-05-28 RX ORDER — BACLOFEN 10 MG/1
20 TABLET ORAL 4 TIMES DAILY PRN
Status: DISCONTINUED | OUTPATIENT
Start: 2022-05-28 | End: 2022-05-29 | Stop reason: HOSPADM

## 2022-05-28 RX ORDER — ONDANSETRON 2 MG/ML
4 INJECTION INTRAMUSCULAR; INTRAVENOUS ONCE
Status: COMPLETED | OUTPATIENT
Start: 2022-05-28 | End: 2022-05-28

## 2022-05-28 RX ORDER — ACETAMINOPHEN 325 MG/1
650 TABLET ORAL EVERY 6 HOURS PRN
Status: DISCONTINUED | OUTPATIENT
Start: 2022-05-28 | End: 2022-05-29 | Stop reason: HOSPADM

## 2022-05-28 RX ORDER — ONDANSETRON 4 MG/1
4 TABLET, ORALLY DISINTEGRATING ORAL EVERY 8 HOURS PRN
Status: DISCONTINUED | OUTPATIENT
Start: 2022-05-28 | End: 2022-05-29 | Stop reason: HOSPADM

## 2022-05-28 RX ORDER — PANTOPRAZOLE SODIUM 40 MG/1
40 TABLET, DELAYED RELEASE ORAL
Status: DISCONTINUED | OUTPATIENT
Start: 2022-05-29 | End: 2022-05-29 | Stop reason: HOSPADM

## 2022-05-28 RX ORDER — LABETALOL HYDROCHLORIDE 5 MG/ML
10 INJECTION, SOLUTION INTRAVENOUS EVERY 4 HOURS PRN
Status: DISCONTINUED | OUTPATIENT
Start: 2022-05-28 | End: 2022-05-29 | Stop reason: HOSPADM

## 2022-05-28 RX ORDER — SODIUM CHLORIDE 0.9 % (FLUSH) 0.9 %
5-40 SYRINGE (ML) INJECTION EVERY 12 HOURS SCHEDULED
Status: DISCONTINUED | OUTPATIENT
Start: 2022-05-28 | End: 2022-05-29 | Stop reason: HOSPADM

## 2022-05-28 RX ORDER — CLOPIDOGREL BISULFATE 75 MG/1
75 TABLET ORAL DAILY
Status: DISCONTINUED | OUTPATIENT
Start: 2022-05-29 | End: 2022-05-29 | Stop reason: HOSPADM

## 2022-05-28 RX ORDER — MECLIZINE HYDROCHLORIDE 25 MG/1
25 TABLET ORAL 3 TIMES DAILY PRN
Status: DISCONTINUED | OUTPATIENT
Start: 2022-05-28 | End: 2022-05-29 | Stop reason: HOSPADM

## 2022-05-28 RX ORDER — FENTANYL CITRATE 50 UG/ML
50 INJECTION, SOLUTION INTRAMUSCULAR; INTRAVENOUS ONCE
Status: COMPLETED | OUTPATIENT
Start: 2022-05-28 | End: 2022-05-28

## 2022-05-28 RX ORDER — ASPIRIN 81 MG/1
324 TABLET, CHEWABLE ORAL ONCE
Status: COMPLETED | OUTPATIENT
Start: 2022-05-28 | End: 2022-05-28

## 2022-05-28 RX ORDER — ACETAMINOPHEN 650 MG/1
650 SUPPOSITORY RECTAL EVERY 6 HOURS PRN
Status: DISCONTINUED | OUTPATIENT
Start: 2022-05-28 | End: 2022-05-29 | Stop reason: HOSPADM

## 2022-05-28 RX ORDER — ENOXAPARIN SODIUM 100 MG/ML
40 INJECTION SUBCUTANEOUS DAILY
Status: DISCONTINUED | OUTPATIENT
Start: 2022-05-28 | End: 2022-05-29 | Stop reason: HOSPADM

## 2022-05-28 RX ORDER — LANOLIN ALCOHOL/MO/W.PET/CERES
400 CREAM (GRAM) TOPICAL EVERY MORNING
Status: DISCONTINUED | OUTPATIENT
Start: 2022-05-28 | End: 2022-05-29 | Stop reason: HOSPADM

## 2022-05-28 RX ORDER — LEVOTHYROXINE SODIUM 0.12 MG/1
125 TABLET ORAL DAILY
Status: CANCELLED | OUTPATIENT
Start: 2022-05-28

## 2022-05-28 RX ORDER — ASCORBIC ACID 500 MG
500 TABLET ORAL DAILY
Status: DISCONTINUED | OUTPATIENT
Start: 2022-05-28 | End: 2022-05-29 | Stop reason: HOSPADM

## 2022-05-28 RX ADMIN — ENOXAPARIN SODIUM 40 MG: 100 INJECTION SUBCUTANEOUS at 14:59

## 2022-05-28 RX ADMIN — ONDANSETRON 4 MG: 2 INJECTION INTRAMUSCULAR; INTRAVENOUS at 05:40

## 2022-05-28 RX ADMIN — ASPIRIN 324 MG: 81 TABLET, CHEWABLE ORAL at 05:40

## 2022-05-28 RX ADMIN — FENTANYL CITRATE 50 MCG: 50 INJECTION, SOLUTION INTRAMUSCULAR; INTRAVENOUS at 06:32

## 2022-05-28 RX ADMIN — BACLOFEN 20 MG: 10 TABLET ORAL at 20:58

## 2022-05-28 RX ADMIN — BACLOFEN 20 MG: 10 TABLET ORAL at 14:59

## 2022-05-28 RX ADMIN — SODIUM CHLORIDE, PRESERVATIVE FREE 10 ML: 5 INJECTION INTRAVENOUS at 20:58

## 2022-05-28 ASSESSMENT — LIFESTYLE VARIABLES: HOW OFTEN DO YOU HAVE A DRINK CONTAINING ALCOHOL: NEVER

## 2022-05-28 ASSESSMENT — ENCOUNTER SYMPTOMS
VOMITING: 0
COUGH: 0
ABDOMINAL PAIN: 0
NAUSEA: 0
SHORTNESS OF BREATH: 0

## 2022-05-28 ASSESSMENT — HEART SCORE: ECG: 1

## 2022-05-28 NOTE — PROGRESS NOTES
Pt ambulating throughout floor, sitting at table in common area eating supper, no complaints voiced, will continue to monitor.

## 2022-05-28 NOTE — ED NOTES
Contacted Dr. Amador Height per Dr. Ernestina Rincon request.     Judith DOMINGUEZ Reser  05/28/22 6867

## 2022-05-28 NOTE — ED PROVIDER NOTES
[facility] ED  EMERGENCY DEPARTMENT ENCOUNTER      Pt Name: Enrique Gerardo  MRN: [id number]  [place] 1960  Date of evaluation: 5/28/2022  Provider: Wally Finney MD    [address] [place] [address]       Chief Complaint   Patient presents with    Chest Pain     Pt states she was having pain across her back, and a feeling of impending doom. Felt warm and flushed. HISTORY OF PRESENT ILLNESS   (Location/Symptom, Timing/Onset, Context/Setting, Quality, Duration, Modifying Factors, Severity)  Note limiting factors. Enrique Gerardo is a 63 y.o. female who presents to the emergency department by EMS for evaluation of nausea and chest pain. She had an episode of what she described as a warm sensation with some associated chest pressure last evening, though this resolved relatively quickly and she reports that she \"did not think anything of it. \"  However, it recurred earlier this morning and was more persistent. She notes that the presentation is consistent with \"when I needed a stent before. \"  At this time she endorses some nausea but no longer has any chest discomfort. She states that the pain was predominantly substernal and nonradiating, though the warmth was effectively body wide. No other complaints. Nursing Notes were reviewed. REVIEW OF SYSTEMS    (2-9 systems for level 4, 10 or more for level 5)     Review of Systems   Constitutional: Negative for fever. HENT: Negative. Respiratory: Negative for cough and shortness of breath. Cardiovascular: Positive for chest pain. Negative for leg swelling. Gastrointestinal: Negative for abdominal pain, nausea and vomiting. Musculoskeletal: Negative. Neurological: Negative. Except as noted above the remainder of the review of systems was reviewed and negative.        PAST MEDICAL HISTORY     Past Medical History:   Diagnosis Date    Alveolar hypoventilation     Dermatitis herpetiformis     GERD (gastroesophageal reflux disease) constipation, sensitive to glutens    Gout     Hearing loss     Hyperlipidemia     Hypertension     Hypothyroidism     Morbid obesity ([address]     Multiple sclerosis (HCC)     Neuropathy     MINH (obstructive sleep apnea)     intolerant of cpap    PAD (peripheral artery disease) (HCC)     SVT [facility]supraventricular tachycardia) ([facility])     Type II or unspecified type diabetes mellitus without mention of complication, not stated as uncontrolled     Vertigo          SURGICAL HISTORY       Past Surgical History:   Procedure Laterality Date    CARDIAC [facility]TERIZATION Left 2018    [facility]    CARPAL TUNNEL RELEASE      left    CERVICAL DISC SURGERY       SECTION          CORONARY ANGIOPLASTY WITH STENT PLACEMENT      2 stents     DILATION AND CURETTAGE OF UTERUS      HYSTERECTOMY  2006    cervical dysplasia         CURRENT MEDICATIONS       Previous Medications    BACLOFEN (LIORESAL) 20 MG TABLET    Take 1 tablet by mouth 4 times daily    CLOPIDOGREL (PLAVIX) 75 MG TABLET    Take 1 tablet by mouth daily    LEVOTHYROXINE (SYNTHROID) 200 MCG TABLET    Take 1 tablet by mouth Daily    MAGNESIUM 250 MG TABS    Take 250 mg by mouth every morning     MECLIZINE (ANTIVERT) 25 MG TABLET    Take 1 tablet by mouth 3 times daily as needed for Dizziness    METFORMIN (GLUCOPHAGE) 1000 MG TABLET    Take 1 tablet by mouth daily (with breakfast).     METOPROLOL TARTRATE (LOPRESSOR) 25 MG TABLET    Take 1 tablet by mouth 2 times daily    NONFORMULARY    Take 1 tablet by mouth daily OTC Potassium supplement    OMEPRAZOLE (PRILOSEC) 40 MG DELAYED RELEASE CAPSULE    Take 1 capsule by mouth every morning (before breakfast)    ONDANSETRON (ZOFRAN ODT) 4 MG DISINTEGRATING TABLET    Take 1 tablet by mouth every 8 hours as needed for Nausea or Vomiting    ONDANSETRON (ZOFRAN ODT) 4 MG DISINTEGRATING TABLET    Take 1 tablet by mouth every 8 hours as needed for Nausea    VITAMIN C (ASCORBIC ACID) 500 MG TABLET    Take 500 mg by mouth daily    VITAMIN D-3 (CHOLECALCIFEROL) 5000 UNITS TABS    Take 1,500 Units by mouth every 7 days Takes 1500mg on Mondays       ALLERGIES     Bee venom, Codeine, Erythromycin, Janumet [sitagliptin-metformin hcl er], and Other    FAMILY HISTORY       Family History   Problem Relation Age of Onset    Diabetes Mother     High Cholesterol Mother     Hypertension Mother     Heart Disease Father     Hypertension Father     Other Father         abdominal aneurysm    Mult Sclerosis Sister     Diabetes Maternal Grandmother     Diabetes Maternal Grandfather     Colon Cancer Paternal Grandmother     Other Paternal Grandfather         brain aneurysm    Colon Polyps Other         Aunts with colon polyps          SOCIAL HISTORY       Social History     Socioeconomic History    Marital status:      Spouse name: Not on file    Number of children: Not on file    Years of education: Not on file    Highest education level: Not on file   Occupational History    Occupation:      Comment: not working due to medical problems   Tobacco Use    Smoking status: Never Smoker    Smokeless tobacco: Never Used   Vaping Use    Vaping Use: Never used   Substance and Sexual Activity    Alcohol use: No     Comment: occ    Drug use: No    Sexual activity: Yes     Partners: Male   Other Topics Concern    Not on file   Social History Narrative    Not on file     Social Determinants of Health     Financial Resource Strain:     Difficulty of Paying Living Expenses: Not on file   Food Insecurity:     Worried About Running Out of Food in the Last Year: Not on file    [place] of Food in the Last Year: Not on file   Transportation Needs:     Lack of Transportation (Medical): Not on file    Lack of Transportation (Non-Medical):  Not on file   Physical Activity:     Days of Exercise per Week: Not on file    Minutes of Exercise per Session: Not on file Stress:     Feeling of Stress : Not on file   Social Connections:     Frequency of Communication with Friends and Family: Not on file    Frequency of Social Gatherings with Friends and Family: Not on file    Attends Evangelical Services: Not on file    Active Member of Clubs or Organizations: Not on file    Attends Club or Organization Meetings: Not on file    Marital Status: Not on file   Intimate Partner Violence:     Fear of Current or Ex-Partner: Not on file    Emotionally Abused: Not on file    Physically Abused: Not on file    Sexually Abused: Not on file   Housing Stability:     Unable to Pay for Housing in the Last Year: Not on file    Number of [place] in the Last Year: Not on file    Unstable Housing in the Last Year: Not on file       SCREENINGS     Heart Score for chest pain patients  History: Moderately Suspicious  ECG: Non-Specifc repolarization disturbance/LBTB/PM  Patient Age: > 39 and < 65 years  Risk Factors: > 3 Risk factors or history of atherosclerotic disease*  Troponin: < 1X normal limit  Heart Score Total: 5      PHYSICAL EXAM    (up to 7 for level 4, 8 or more for level 5)     ED Triage Vitals [05/28/22 0506]   BP Temp Temp Source Heart Rate Resp SpO2 Height Weight   (!) 215/112 98.1 °F (36.7 °C) Tympanic 87 21 97 % 5' 5\" (1.651 m) 180 lb (81.6 kg)       Physical Exam  Vitals reviewed. Constitutional:       General: She is not in acute distress. Appearance: She is not ill-appearing, toxic-appearing or diaphoretic. HENT:      Head: Normocephalic and atraumatic. Eyes:      General: No scleral icterus. Neck:      Vascular: No JVD. Cardiovascular:      Rate and Rhythm: Normal rate and regular rhythm. Heart sounds: No murmur heard. Pulmonary:      Effort: Pulmonary effort is normal. No respiratory distress. Breath sounds: Normal breath sounds. No stridor. No wheezing, rhonchi or rales. Abdominal:      General: There is no distension. Palpations: Abdomen is soft. There is no hepatomegaly, splenomegaly, mass or pulsatile mass. Tenderness: There is no abdominal tenderness. There is no guarding or rebound. Negative signs include Hawkins's sign. Musculoskeletal:      Cervical back: Neck supple. Comments: No lower extremity swelling or tenderness to palpation. Skin:     General: Skin is warm and dry. Neurological:      General: No focal deficit present. Mental Status: She is alert and oriented to person, place, and time. Psychiatric:         Mood and Affect: Mood normal.         Behavior: Behavior normal.         DIAGNOSTIC RESULTS     EKG: All EKG's are interpreted by the Emergency Department Physician who either signs or Co-signs this chart in the absence of a cardiologist.    No STEMI. Sinus rhythm at 70 bpm. T wave inversion in leads V1 through V3 as well as T wave flattening in lead V4; these are new from prior dated 4/21/2022. Previously noted T wave inversions inferiorly are now resolved. RADIOLOGY:     Interpretation per the Radiologist below, if available at the time of this note:    XR CHEST PORTABLE   Final Result   Clear chest without acute cardiopulmonary process. LABS:  Labs Reviewed   CBC WITH AUTO DIFFERENTIAL - Abnormal; Notable for the following components:       Result Value    Seg Neutrophils 80 (*)     Lymphocytes 9 (*)     Absolute Lymph # 0.79 (*)     All other components within normal limits   COMPREHENSIVE METABOLIC PANEL W/ REFLEX TO MG FOR LOW K - Abnormal; Notable for the following components:    Glucose 241 (*)     Alkaline Phosphatase 116 (*)     Total Bilirubin 0.20 (*)     All other components within normal limits   TROPONIN   TROPONIN       All other labs were within normal range or not returned as of this dictation.     EMERGENCY DEPARTMENT COURSE and DIFFERENTIAL DIAGNOSIS/MDM:   Vitals:    Vitals:    05/28/22 0514 05/28/22 0530 05/28/22 0544 05/28/22 0600   BP: (!) 190/83 (!) 168/138 (!) 152/70 (!) 152/68   Pulse:   76 67   Resp:   16 10   Temp:       TempSrc:       SpO2: 97% 96% 98% 95%   Weight:       Height:         Patient stable and generally well-appearing. Physical  Lamination is unremarkable. Noted to be markedly hypertensive on arrival, though improved without treatment while in the ED. Initial EKG demonstrates new ST changes as noted above but no STEMI. Troponins are normal x2 in the ED. However, has an HEART score of 5. Unremarkable chest x-ray. Clinically, doubt PE. Doubt aortic pathology. Given the patient's history of CAD with subsequent PCI, and her report that symptoms today are consistent with her apparent prior MI and need for stents, plan is for admission to the hospitalist for further management and consultation with cardiology if necessary. Discussed with Dr. Adele Reno, hospitalist, to arrange for this. REASSESSMENT     ED Course as of 05/28/22 0713   Sat May 28, 2022   0613 Repeat EKG is sinus rhythm at 75 bpm.  No STEMI. Continues to have T wave inversion V1 through V3, though not as pronounced as previous. [SR]      ED Course User Index  [SR] Shelly Muñoz MD       FINAL IMPRESSION      1.  Chest pain, unspecified type          DISPOSITION/PLAN   DISPOSITION Admitted 05/28/2022 07:02:12 AM      (Please note that portions of this note were completed with a voice recognition program.  Efforts were made to edit the dictations but occasionally words are mis-transcribed.)    Shelly Muñoz MD (electronically signed)  Attending Emergency Physician            Shelly Muñoz MD  05/28/22 [id number]

## 2022-05-28 NOTE — ED NOTES
Pt provided a meal tray.  Dr. Tiffany Malone ok's for pt to take her own home meds     Hollie Woodward RN  05/28/22 [id number]

## 2022-05-28 NOTE — H&P
[address], [facility]    History & Physical Examination Note              Date:   5/28/2022  Patient name:  Mir Palomino  Date of admission:  5/28/2022  5:04 AM  MRN:   [id number]  YOB: 1960    CHIEF COMPLAINT:     Chief Complaint   Patient presents with    Chest Pain     Pt states she was having pain across her back, and a feeling of impending doom. Felt warm and flushed. History Obtained From:  Patient and chart review. HPI:     The patient is a 63 y.o.  female with history of coronary artery disease (status post PTCA TR LAD and OM1 stents in 12/2018 w/ Shayan Mckay), peripheral artery disease, SVT, type 2 diabetes, hyperlipidemia, hypertension, hypothyroidism due to Hashimoto's who presented earlier today via EMS for evaluation of nausea and chest pain. Patient states that she had an episode where she was feeling cold and hot in the mid chest, it was rated 3-4 out of 10 at the time. Not worse with exertion or stress. Currently she rates the pain a 0 out of 10 it was mid chest with no radiation. She had a warm type sensation pressure in the evening prior. Patient states that she had a prior feeling in the past when she required a cardiac stent. Patient denies any history of smoking or secondhand smoke. She had some nausea, and a history of GERD which she takes Prilosec on a as needed basis. Patient states that she has a history of Hashimoto's but has been off of her Synthroid medication since sometime in April because of a low TSH value. She was on 200 mcg of Synthroid for some time. She denies any fevers chills history of any sick contacts, or upper respiratory symptoms. In the ED labs imaging and EKG were obtained as well as serial troponins.     PAST MEDICAL HISTORY:        has a past medical history of Alveolar hypoventilation, Dermatitis herpetiformis, GERD (gastroesophageal reflux disease), Gout, Hearing loss, Hyperlipidemia, Hypertension, Hypothyroidism, Morbid obesity ([address] Multiple sclerosis ([address] Neuropathy, MINH (obstructive sleep apnea), PAD (peripheral artery disease) ([address] SVT (supraventricular tachycardia) ([address] Type II or unspecified type diabetes mellitus without mention of complication, not stated as uncontrolled, and Vertigo. Diagnosis Date    Alveolar hypoventilation     Dermatitis herpetiformis     GERD (gastroesophageal reflux disease)     constipation, sensitive to glutens    Gout     Hearing loss     Hyperlipidemia     Hypertension     Hypothyroidism     Morbid obesity ([address]     Multiple sclerosis (HCC)     Neuropathy     MINH (obstructive sleep apnea)     intolerant of cpap    PAD (peripheral artery disease) (HCC)     SVT (supraventricular tachycardia) (HCC)     Type II or unspecified type diabetes mellitus without mention of complication, not stated as uncontrolled     Vertigo        PAST SURGICAL HISTORY:      has a past surgical history that includes Hysterectomy ();  section; Carpal tunnel release; Dilation and curettage of uterus; Cervical disc surgery; Cardiac catheterization (Left, 2018); and Coronary angioplasty with stent (). Procedure Laterality Date    CARDIAC CATHETERIZATION Left 2018    right radial/[facility]/Dr Pollard    CARPAL TUNNEL RELEASE      left    CERVICAL DISC SURGERY       SECTION          CORONARY ANGIOPLASTY WITH STENT PLACEMENT      2 stents     DILATION AND CURETTAGE OF UTERUS      HYSTERECTOMY  2006    cervical dysplasia       FAMILY HISTORY:     family history includes Colon Cancer in her paternal grandmother; Colon Polyps in an other family member; Diabetes in her maternal grandfather, maternal grandmother, and mother; Heart Disease in her father; High Cholesterol in her mother; Hypertension in her father and mother; Mult Sclerosis in her sister; Other in her father and paternal grandfather. Problem Relation Age of Onset    Diabetes Mother     High Cholesterol Mother     Hypertension Mother     Heart Disease Father     Hypertension Father     Other Father         abdominal aneurysm    Mult Sclerosis Sister     Diabetes Maternal Grandmother     Diabetes Maternal Grandfather     Colon Cancer Paternal Grandmother     Other Paternal Grandfather         brain aneurysm    Colon Polyps Other         Aunts with colon polyps       HOME MEDICATIONS:     Prior to Admission medications    Medication Sig Start Date End Date Taking?  Authorizing Provider   vitamin C (ASCORBIC ACID) 500 MG tablet Take 500 mg by mouth daily    Historical Provider, MD   omeprazole (PRILOSEC) 40 MG delayed release capsule Take 1 capsule by mouth every morning (before breakfast)  Patient not taking: Reported on 5/28/2022 4/21/22   Urszula Joy, APRN - CNP   meclizine (ANTIVERT) 25 MG tablet Take 1 tablet by mouth 3 times daily as needed for Dizziness 4/22/21   Radha Cee MD   ondansetron (ZOFRAN ODT) 4 MG disintegrating tablet Take 1 tablet by mouth every 8 hours as needed for Nausea 3/30/21   Noe Baltazar MD   baclofen (LIORESAL) 20 MG tablet Take 1 tablet by mouth 4 times daily 3/21/21   Aditya Rivas MD   ondansetron (ZOFRAN ODT) 4 MG disintegrating tablet Take 1 tablet by mouth every 8 hours as needed for Nausea or Vomiting 3/21/21   Aditya Rivas MD   NONFORMULARY Take 1 tablet by mouth daily OTC Potassium supplement    Historical Provider, MD   vitamin D-3 (CHOLECALCIFEROL) 5000 units TABS Take 1,500 Units by mouth every 7 days Takes 1500mg on Mondays    Historical Provider, MD   metoprolol tartrate (LOPRESSOR) 25 MG tablet Take 1 tablet by mouth 2 times daily  Patient taking differently: Take 25 mg by mouth daily  12/8/18   Shayne St MD   clopidogrel (PLAVIX) 75 MG tablet Take 1 tablet by mouth daily 12/9/18   Shayne St MD   levothyroxine (SYNTHROID) 200 MCG tablet Take 1 tablet by mouth Daily  Patient not taking: Reported on 5/28/2022 4/10/18   Malena Tran MD   metformin (GLUCOPHAGE) 1000 MG tablet Take 1 tablet by mouth daily (with breakfast). Patient taking differently: Take 500 mg by mouth 3 times daily (before meals) Indications: resume after repeating bun/cr if normal  3/20/13   MARTÍN Anders - CNP   Magnesium 250 MG TABS Take 250 mg by mouth every morning     Historical Provider, MD       ALLERGIES:      Bee venom, Codeine, Erythromycin, Janumet [sitagliptin-metformin hcl er], and Other    SOCIAL HISTORY:      reports that she has never smoked. She has never used smokeless tobacco. She reports that she does not drink alcohol and does not use drugs. TOBACCO:   reports that she has never smoked. She has never used smokeless tobacco.  ETOH:   reports no history of alcohol use. Reviewed and non-contributory or as noted above and/or in the HPI    REVIEW OF SYSTEMS:     CONSTITUTIONAL:  no fevers  EYES: negative for double vision  HEENT: No headaches, no rhinorrhea, no nasal congestion, no sore throat, no difficulty swallowing  RESPIRATORY:negative for dyspnea, no wheezing.   CARDIOVASCULAR: Positive for chest pain, no palpitations, no fatigue, no edema   GASTROINTESTINAL: no nausea, no vomiting, no change in bowel habits, no abdominal pain, positive for heartburn  GENITOURINARY: negative for frequency, no dysuria, no nocturia   INTEGUMENT: negative for rash, no easy bruising   HEMATOLOGIC/LYMPHATIC: negative for swelling/edema   ALLERGIC/IMMUNOLOGIC: negative for urticaria   ENDOCRINE: no polydipsia, no polyuria, no hot or cold intolerance  MUSCULOSKELETAL:  no swelling of joints or extremities, history of chronic shoulder pain  NEUROLOGICAL: no numbness, no tingling  BEHAVIOR/PSYCH: negative      PHYSICAL EXAM:     Vitals:    05/28/22 1300 05/28/22 1315 05/28/22 1345 05/28/22 1600   BP: (!) 90/59 (!) 102/47 125/69 125/69   Pulse: 76 76 81 81   Resp: 13 15 16 16   Temp:   97.9 °F (36.6 °C) 97.9 °F (36.6 °C)   TempSrc:   Temporal Temporal   SpO2: 96% 95% 99% 99%   Weight:   193 lb 4.8 oz (87.7 kg)    Height:   5' 5\" (1.651 m)        No intake or output data in the 24 hours ending 05/28/22 1641     General Appearance  Alert , awake , oriented x 3, not in acute distress   HEENT - Head is normocephalic, atraumatic.  Eye - no icterus no redness, EOMI   Ear- NO ear pain, normal external ear , no discharge   Lungs - Bilateral equal air entry , no wheezes, rales or rhonchi, aeration good   Cardiovascular - Heart sounds are normal.  Regular rhythm, normal rate without murmur, gallop or rub.  Abdomen - Soft, nontender, nondistended, no masses or organomegaly   Neurologic - There are no new focal motor or sensory deficits, muscle strength 5/5 in all extremities, normal muscle tone and bulk, no abnormal sensation.    Skin - No bruising or bleeding on exposed skin area   Extremities - No cyanosis, clubbing or edema   Psych - normal affect       DIAGNOSTICS:      Laboratory Testing:    Recent Results (from the past 24 hour(s))   EKG 12 Lead    Collection Time: 05/28/22  5:14 AM   Result Value Ref Range    Ventricular Rate 69 BPM    Atrial Rate 69 BPM    P-R Interval 126 ms    QRS Duration 88 ms    Q-T Interval 396 ms    QTc Calculation (Bazett) 424 ms    P Axis 35 degrees    R Axis 13 degrees    T Axis 18 degrees   CBC with Auto Differential    Collection Time: 05/28/22  5:20 AM   Result Value Ref Range    WBC 9.0 3.5 - 11.3 k/uL    RBC 4.57 3.95 - 5.11 m/uL    Hemoglobin 13.1 11.9 - 15.1 g/dL    Hematocrit 40.2 36.3 - 47.1 %    MCV 88.0 82.6 - 102.9 fL    MCH 28.7 25.2 - 33.5 pg    MCHC 32.6 28.4 - 34.8 g/dL    RDW 13.7 11.8 - 14.4 %    Platelets 091 804 - 808 k/uL    MPV 11.9 8.1 - 13.5 fL    NRBC Automated 0.0 0.0 per 100 WBC    Seg Neutrophils 80 (H) 36 - 65 %    Lymphocytes 9 (L) 24 - 43 %    Monocytes 7 3 - 12 %    Eosinophils % 3 1 - 4 %    Basophils 1 0 - 2 %    Immature Granulocytes 0 0 %    Segs Absolute 7.25 1.50 - 8.10 k/uL    Absolute Lymph # 0.79 (L) 1.10 - 3.70 k/uL    Absolute Mono # 0.60 0.10 - 1.20 k/uL    Absolute Eos # 0.27 0.00 - 0.44 k/uL    Basophils Absolute 0.10 0.00 - 0.20 k/uL    Absolute Immature Granulocyte 0.03 0.00 - 0.30 k/uL   Comprehensive Metabolic Panel w/ Reflex to MG    Collection Time: 05/28/22  5:20 AM   Result Value Ref Range    Glucose 241 (H) 70 - 99 mg/dL    BUN 13 8 - 23 mg/dL    CREATININE 0.79 0.50 - 0.90 mg/dL    Bun/Cre Ratio 16 9 - 20    Calcium 9.2 8.6 - 10.4 mg/dL    Sodium 136 135 - 144 mmol/L    Potassium 3.9 3.7 - 5.3 mmol/L    Chloride 98 98 - 107 mmol/L    CO2 27 20 - 31 mmol/L    Anion Gap 11 9 - 17 mmol/L    Alkaline Phosphatase 116 (H) 35 - 104 U/L    ALT 8 5 - 33 U/L    AST 12 <32 U/L    Total Bilirubin 0.20 (L) 0.3 - 1.2 mg/dL    Total Protein 7.6 6.4 - 8.3 g/dL    Albumin 4.1 3.5 - 5.2 g/dL    Albumin/Globulin Ratio 1.2 1.0 - 2.5    GFR Non-African American >60 >60 mL/min    GFR African American >60 >60 mL/min    GFR Comment          GFR Staging         Troponin    Collection Time: 05/28/22  5:20 AM   Result Value Ref Range    Troponin, High Sensitivity 8 0 - 14 ng/L   TSH with Reflex    Collection Time: 05/28/22  5:20 AM   Result Value Ref Range    TSH 46.16 (H) 0.30 - 5.00 uIU/mL   EKG 12 Lead    Collection Time: 05/28/22  6:11 AM   Result Value Ref Range    Ventricular Rate 76 BPM    Atrial Rate 76 BPM    P-R Interval 132 ms    QRS Duration 90 ms    Q-T Interval 382 ms    QTc Calculation (Bazett) 429 ms    P Axis 36 degrees    R Axis 1 degrees    T Axis 9 degrees   Troponin    Collection Time: 05/28/22  6:23 AM   Result Value Ref Range    Troponin, High Sensitivity 9 0 - 14 ng/L   EKG 12 Lead    Collection Time: 05/28/22  1:11 PM   Result Value Ref Range    Ventricular Rate 73 BPM    Atrial Rate 73 BPM    P-R Interval 128 ms    QRS Duration 88 ms    Q-T Interval 394 ms    QTc Calculation (Bazett) 434 ms    P Axis 43 degrees    R Axis 18 degrees    T Axis 17 degrees   Troponin    Collection Time: 05/28/22  2:15 PM   Result Value Ref Range    Troponin, High Sensitivity 9 0 - 14 ng/L         Imaging/[employer]:  XR RADIUS ULNA RIGHT (2 VIEWS)    Result Date: 5/19/2022  EXAMINATION: XRAY VIEWS OF THE RIGHT WRIST; TWO XRAY VIEWS OF THE RIGHT FOREARM; THREE XRAY VIEWS OF THE RIGHT HAND 5/19/2022 2:30 pm COMPARISON: None. HISTORY: ORDERING SYSTEM PROVIDED HISTORY: Right wrist pain FINDINGS: No acute osseous abnormality. Alignment is anatomic. Moderate to advanced osteoarthrosis with subcarinal cysts and hypertrophic changes at carpal articulations. Nonspecific dorsal soft tissue swelling the level the and rest.     No acute fracture. Nonspecific dorsal soft tissue swelling at the level the right wrist and hand. Moderate to advanced osteoarthrosis. XR WRIST RIGHT (MIN 3 VIEWS)    Result Date: 5/19/2022  EXAMINATION: XRAY VIEWS OF THE RIGHT WRIST; TWO XRAY VIEWS OF THE RIGHT FOREARM; THREE XRAY VIEWS OF THE RIGHT HAND 5/19/2022 2:30 pm COMPARISON: None. HISTORY: ORDERING SYSTEM PROVIDED HISTORY: Right wrist pain FINDINGS: No acute osseous abnormality. Alignment is anatomic. Moderate to advanced osteoarthrosis with subcarinal cysts and hypertrophic changes at carpal articulations. Nonspecific dorsal soft tissue swelling the level the and rest.     No acute fracture. Nonspecific dorsal soft tissue swelling at the level the right wrist and hand. Moderate to advanced osteoarthrosis. XR HAND RIGHT (MIN 3 VIEWS)    Result Date: 5/19/2022  EXAMINATION: XRAY VIEWS OF THE RIGHT WRIST; TWO XRAY VIEWS OF THE RIGHT FOREARM; THREE XRAY VIEWS OF THE RIGHT HAND 5/19/2022 2:30 pm COMPARISON: None. HISTORY: ORDERING SYSTEM PROVIDED HISTORY: Right wrist pain FINDINGS: No acute osseous abnormality. Alignment is anatomic. Moderate to advanced osteoarthrosis with subcarinal cysts and hypertrophic changes at carpal articulations.   Nonspecific dorsal soft tissue swelling the level the and rest.     No acute fracture. Nonspecific dorsal soft tissue swelling at the level the right wrist and hand. Moderate to advanced osteoarthrosis. XR CHEST PORTABLE    Result Date: 5/28/2022  EXAMINATION: ONE XRAY VIEW OF THE CHEST 5/28/2022 5:11 am COMPARISON: 04/21/2022 HISTORY: ORDERING SYSTEM PROVIDED HISTORY: chest pain TECHNOLOGIST PROVIDED HISTORY: chest pain FINDINGS: Cardiac and mediastinal silhouettes appear within normal limits for size. Pulmonary vascularity is normal.  No parenchymal consolidation or pleural effusion. No pneumothorax. No acute osseous abnormality. A cervical plate is seen in the lower cervical spine. Degenerative changes seen at the [facility] joints. Clear chest without acute cardiopulmonary process. ASSESSMENT:       Principal Problem:    Hypertensive urgency  Active Problems:    Elevated troponin    Acute electrocardiogram changes    MINH (obstructive sleep apnea)    Dyslipidemia    HTN (hypertension)    Obesity (BMI 30-39. 9)    MS (multiple sclerosis) ([facility]    Type 2 diabetes mellitus without complication, without long-term current use of insulin ([address]    Chest pain  Resolved Problems:    * No resolved hospital problems.  *      PLAN:     Patient status: Admit the patient as observation    HTN Urgency, Chest Pain  Discussed case with Cardiology, plan to observe for 24 hours, repeat troponin and EKG in a.m. and reevaluate, if stable, plan to d/c in AM - pt was agreeable to this  Monitor CBC, BMP  Continue with Toprol, Plavix  Protonix daily for GERD/heartburn  Continue with home meds of metformin, Antivert, baclofen as needed  Continue with supplementation including vitamin C and magnesium  Continue with Lovenox for DVT prophylaxis  Chest x-ray and EKG were reviewed as well as labs    Consultations:   Consults: IP CONSULT TO CASE MANAGEMENT  IP CONSULT TO CARDIOLOGY  PT/OT    Nursing:  Vital signs per unit routine  RT Evaluation & Treat   Daily weights  Fall precautions  Incentive spirometry  Intake and output   Neurovascular checks  Orthostatic blood pressure  Place intermittent pneumatic compression device  Telemetry monitoring    Advance Directive Addressed: Yes    Above plan discussed with the patient who agreed to the above plan     CORE MEASURES  Core measures including DVT prophylaxis, Code Status/Advanced Directives, Nutrition, Therapy Options as well as prior records, imaging, and labs were reviewed at this encounter. Please note that this chart was generated using voice recognition Dragon dictation software. Although every effort was made to ensure the accuracy of this automated transcription, some errors in transcription may have occurred.     Sunita Arenas MD  5/28/2022 4:41 PM

## 2022-05-28 NOTE — PROGRESS NOTES
Writer at bedside at this time to perform initial shift assessment. Patient is sitting up in the chair at this time with family at bedside. Vital signs taken and assessment completed at this time. Patient denies any further needs at this time. Call light within reach, will continue to monitor.

## 2022-05-28 NOTE — PROGRESS NOTES
Pt admitted to floor at this time assessment and vitals complete, admission navigator complete, patient oriented to room and call light, patient denies any chest pain at this time, all needs met, call light within reach, will continue to monitor.

## 2022-05-29 VITALS
RESPIRATION RATE: 16 BRPM | HEIGHT: 65 IN | OXYGEN SATURATION: 97 % | TEMPERATURE: 97.2 F | BODY MASS INDEX: 32.49 KG/M2 | HEART RATE: 72 BPM | WEIGHT: 195 LBS | SYSTOLIC BLOOD PRESSURE: 142 MMHG | DIASTOLIC BLOOD PRESSURE: 83 MMHG

## 2022-05-29 LAB
ABSOLUTE EOS #: 0.38 K/UL (ref 0–0.44)
ABSOLUTE IMMATURE GRANULOCYTE: <0.03 K/UL (ref 0–0.3)
ABSOLUTE LYMPH #: 1.16 K/UL (ref 1.1–3.7)
ABSOLUTE MONO #: 0.54 K/UL (ref 0.1–1.2)
ANION GAP SERPL CALCULATED.3IONS-SCNC: 11 MMOL/L (ref 9–17)
BASOPHILS # BLD: 2 % (ref 0–2)
BASOPHILS ABSOLUTE: 0.1 K/UL (ref 0–0.2)
BUN BLDV-MCNC: 16 MG/DL (ref 8–23)
BUN/CREAT BLD: 17 (ref 9–20)
CALCIUM SERPL-MCNC: 9.6 MG/DL (ref 8.6–10.4)
CHLORIDE BLD-SCNC: 98 MMOL/L (ref 98–107)
CO2: 28 MMOL/L (ref 20–31)
CREAT SERPL-MCNC: 0.92 MG/DL (ref 0.5–0.9)
EKG ATRIAL RATE: 65 BPM
EKG P AXIS: 31 DEGREES
EKG P-R INTERVAL: 134 MS
EKG Q-T INTERVAL: 408 MS
EKG QRS DURATION: 88 MS
EKG QTC CALCULATION (BAZETT): 424 MS
EKG R AXIS: 12 DEGREES
EKG T AXIS: 22 DEGREES
EKG VENTRICULAR RATE: 65 BPM
EOSINOPHILS RELATIVE PERCENT: 6 % (ref 1–4)
GFR AFRICAN AMERICAN: >60 ML/MIN
GFR NON-AFRICAN AMERICAN: >60 ML/MIN
GFR SERPL CREATININE-BSD FRML MDRD: ABNORMAL ML/MIN/{1.73_M2}
GFR SERPL CREATININE-BSD FRML MDRD: ABNORMAL ML/MIN/{1.73_M2}
GLUCOSE BLD-MCNC: 240 MG/DL (ref 70–99)
HCT VFR BLD CALC: 40 % (ref 36.3–47.1)
HEMOGLOBIN: 12.9 G/DL (ref 11.9–15.1)
IMMATURE GRANULOCYTES: 0 %
LYMPHOCYTES # BLD: 18 % (ref 24–43)
MCH RBC QN AUTO: 28.5 PG (ref 25.2–33.5)
MCHC RBC AUTO-ENTMCNC: 32.3 G/DL (ref 28.4–34.8)
MCV RBC AUTO: 88.5 FL (ref 82.6–102.9)
MONOCYTES # BLD: 8 % (ref 3–12)
NRBC AUTOMATED: 0 PER 100 WBC
PDW BLD-RTO: 14.1 % (ref 11.8–14.4)
PLATELET # BLD: 248 K/UL (ref 138–453)
PMV BLD AUTO: 12 FL (ref 8.1–13.5)
POTASSIUM SERPL-SCNC: 4.2 MMOL/L (ref 3.7–5.3)
RBC # BLD: 4.52 M/UL (ref 3.95–5.11)
SEG NEUTROPHILS: 66 % (ref 36–65)
SEGMENTED NEUTROPHILS ABSOLUTE COUNT: 4.25 K/UL (ref 1.5–8.1)
SODIUM BLD-SCNC: 137 MMOL/L (ref 135–144)
TROPONIN, HIGH SENSITIVITY: 9 NG/L (ref 0–14)
WBC # BLD: 6.5 K/UL (ref 3.5–11.3)

## 2022-05-29 PROCEDURE — 80048 BASIC METABOLIC PNL TOTAL CA: CPT

## 2022-05-29 PROCEDURE — G0378 HOSPITAL OBSERVATION PER HR: HCPCS

## 2022-05-29 PROCEDURE — 93005 ELECTROCARDIOGRAM TRACING: CPT | Performed by: STUDENT IN AN ORGANIZED HEALTH CARE EDUCATION/TRAINING PROGRAM

## 2022-05-29 PROCEDURE — 36415 COLL VENOUS BLD VENIPUNCTURE: CPT

## 2022-05-29 PROCEDURE — 94761 N-INVAS EAR/PLS OXIMETRY MLT: CPT

## 2022-05-29 PROCEDURE — 93010 ELECTROCARDIOGRAM REPORT: CPT | Performed by: INTERNAL MEDICINE

## 2022-05-29 PROCEDURE — 6370000000 HC RX 637 (ALT 250 FOR IP): Performed by: STUDENT IN AN ORGANIZED HEALTH CARE EDUCATION/TRAINING PROGRAM

## 2022-05-29 PROCEDURE — 84484 ASSAY OF TROPONIN QUANT: CPT

## 2022-05-29 PROCEDURE — 85025 COMPLETE CBC W/AUTO DIFF WBC: CPT

## 2022-05-29 RX ORDER — METOPROLOL SUCCINATE 25 MG/1
25 TABLET, EXTENDED RELEASE ORAL DAILY
Qty: 30 TABLET | Refills: 0 | Status: SHIPPED | OUTPATIENT
Start: 2022-05-30

## 2022-05-29 RX ORDER — PANTOPRAZOLE SODIUM 40 MG/1
40 TABLET, DELAYED RELEASE ORAL
Qty: 30 TABLET | Refills: 0 | Status: SHIPPED | OUTPATIENT
Start: 2022-05-30

## 2022-05-29 RX ORDER — CALCIUM CARBONATE 200(500)MG
500 TABLET,CHEWABLE ORAL 3 TIMES DAILY PRN
Status: DISCONTINUED | OUTPATIENT
Start: 2022-05-29 | End: 2022-05-29 | Stop reason: HOSPADM

## 2022-05-29 RX ORDER — LEVOTHYROXINE SODIUM 0.12 MG/1
125 TABLET ORAL DAILY
Qty: 30 TABLET | Refills: 0 | Status: SHIPPED | OUTPATIENT
Start: 2022-05-29 | End: 2022-06-28

## 2022-05-29 RX ORDER — CALCIUM CARBONATE 200(500)MG
500 TABLET,CHEWABLE ORAL 3 TIMES DAILY PRN
Qty: 90 TABLET | Refills: 0 | Status: SHIPPED | OUTPATIENT
Start: 2022-05-29 | End: 2022-06-28

## 2022-05-29 RX ADMIN — ANTACID TABLETS 500 MG: 500 TABLET, CHEWABLE ORAL at 00:18

## 2022-05-29 RX ADMIN — CLOPIDOGREL BISULFATE 75 MG: 75 TABLET ORAL at 08:59

## 2022-05-29 RX ADMIN — Medication 400 MG: at 08:59

## 2022-05-29 RX ADMIN — PANTOPRAZOLE SODIUM 40 MG: 40 TABLET, DELAYED RELEASE ORAL at 07:09

## 2022-05-29 RX ADMIN — METFORMIN HYDROCHLORIDE 500 MG: 500 TABLET ORAL at 07:09

## 2022-05-29 RX ADMIN — OXYCODONE HYDROCHLORIDE AND ACETAMINOPHEN 500 MG: 500 TABLET ORAL at 08:59

## 2022-05-29 RX ADMIN — METOPROLOL SUCCINATE 25 MG: 25 TABLET, EXTENDED RELEASE ORAL at 08:59

## 2022-05-29 NOTE — PLAN OF CARE
Problem: Discharge Planning  Goal: Discharge to home or other facility with appropriate resources  5/29/2022 1107 by Cara Boudreaux RN  Outcome: Completed     Problem: Safety - Adult  Goal: Free from fall injury  5/29/2022 1107 by Cara Boudreaux RN  Outcome: Completed     Problem: Skin/Tissue Integrity  Goal: Absence of new skin breakdown  Description: 1. Monitor for areas of redness and/or skin breakdown  2. Assess vascular access sites hourly  3. Every 4-6 hours minimum:  Change oxygen saturation probe site  4. Every 4-6 hours:  If on nasal continuous positive airway pressure, respiratory therapy assess nares and determine need for appliance change or resting period.   5/29/2022 1107 by Cara Boudreaux RN  Outcome: Completed

## 2022-05-29 NOTE — PLAN OF CARE
Problem: Discharge Planning  Goal: Discharge to home or other facility with appropriate resources  5/29/2022 0023 by Taya Villarreal RN  Outcome: Progressing  Flowsheets (Taken 5/29/2022 0023)  Discharge to home or other facility with appropriate resources: Identify barriers to discharge with patient and caregiver  Note:  working with patient. Problem: Safety - Adult  Goal: Free from fall injury  5/29/2022 0023 by Taya Villarreal RN  Outcome: Progressing  Flowsheets (Taken 5/29/2022 0023)  Free From Fall Injury: Instruct family/caregiver on patient safety     Problem: Skin/Tissue Integrity  Goal: Absence of new skin breakdown  Description: 1. Monitor for areas of redness and/or skin breakdown  2. Assess vascular access sites hourly  3. Every 4-6 hours minimum:  Change oxygen saturation probe site  4. Every 4-6 hours:  If on nasal continuous positive airway pressure, respiratory therapy assess nares and determine need for appliance change or resting period. Outcome: Progressing  Note: Manfred scale monitoring per protocol. Inspect skin for breakdown frequently. Encourage pt to make frequent large adjustments in position or assist patient with turning. Document all areas of breakdown.

## 2022-05-29 NOTE — DISCHARGE SUMMARY
[address], [address]    Discharge Summary      NAME:  Ben Funez  :  1960  MRN:  [id number]    Admit date:  2022  Discharge date:  2022    Admitting Physician:  Eveline Jang MD    Primary Diagnosis on Admission:   Present on Admission:   Hypertensive urgency   HTN (hypertension)   MS (multiple sclerosis) (HCC)   Type 2 diabetes mellitus without complication, without long-term current use of insulin (HCC)   MINH (obstructive sleep apnea)   Obesity (BMI 30-39. 9)   Dyslipidemia   Elevated troponin   Acute electrocardiogram changes   Chest pain      Secondary Diagnoses:  does not have any pertinent problems on file. Admission Condition:  good     Discharged Condition: good    Hospital Course: The patient was admitted for the management of HTN and Chest pain. In the ED labs imaging and EKG/troponins were obtained. Patient was monitored overnight and repeat EKG as well as troponins were obtained. Patient had some heartburn overnight which resolved with Tums. Patient denied any chest pain pressure discomfort or other cardiac symptoms in the morning. EKG and troponins were reviewed. BP readings were stable. TSH values were reviewed, synthroid 125mcg for now with repeat labs in 6-8 weeks. Various synthroid doses were noted in the chart. She had been off synthroid for some time now. Patient will have close OP follow-up for managing this of which she was agreeable. Today on day of discharge pt feels better with no further complaints. Vitals and Labs are at pts baseline. If there are any worsening or concerning signs or symptoms, patient will report to the ED and/or contact [id number] for immediate evaluation. Teach back method was used. All patient questions answered. Pt voiced understanding.      Consults:  Cardiology     Significant Diagnostic/theraputic interventions: EKG, troponin      Disposition:   home    Instructions to Patient: Follow up with Ana Maria Brunner DO in  1-2 weeks    Follow-up with Cardiology/ in 1-2 weeks as directed    Discharge Medications:       Medication List      START taking these medications    calcium carbonate 500 MG chewable tablet  Commonly known as: TUMS  Take 1 tablet by mouth 3 times daily as needed for Heartburn     metoprolol succinate 25 MG extended release tablet  Commonly known as: TOPROL XL  Take 1 tablet by mouth daily  Start taking on: May 30, 2022     pantoprazole 40 MG tablet  Commonly known as: PROTONIX  Take 1 tablet by mouth every morning (before breakfast)  Start taking on: May 30, 2022        CHANGE how you take these medications    levothyroxine 125 MCG tablet  Commonly known as: SYNTHROID  Take 1 tablet by mouth Daily  What changed:   · medication strength  · how much to take     metFORMIN 1000 MG tablet  Commonly known as: Glucophage  Take 1 tablet by mouth daily (with breakfast). What changed:   · how much to take  · when to take this        CONTINUE taking these medications    baclofen 20 MG tablet  Commonly known as: LIORESAL  Take 1 tablet by mouth 4 times daily     clopidogrel 75 MG tablet  Commonly known as: PLAVIX  Take 1 tablet by mouth daily     magnesium 250 MG Tabs tablet  Commonly known as: MAGNESIUM-OXIDE     meclizine 25 MG tablet  Commonly known as: ANTIVERT  Take 1 tablet by mouth 3 times daily as needed for Dizziness     NONFORMULARY     * ondansetron 4 MG disintegrating tablet  Commonly known as: Zofran ODT  Take 1 tablet by mouth every 8 hours as needed for Nausea or Vomiting     * ondansetron 4 MG disintegrating tablet  Commonly known as: Zofran ODT  Take 1 tablet by mouth every 8 hours as needed for Nausea     vitamin C 500 MG tablet  Commonly known as: ASCORBIC ACID     vitamin D-3 125 MCG (5000 UT) Tabs  Commonly known as: cholecalciferol         * This list has 2 medication(s) that are the same as other medications prescribed for you.  Read the directions carefully, and ask your doctor or other care provider to review them with you. STOP taking these medications    metoprolol tartrate 25 MG tablet  Commonly known as: LOPRESSOR     omeprazole 40 MG delayed release capsule  Commonly known as: PRILOSEC           Where to Get Your Medications      These medications were sent to [address]    Phone: [phone]   · calcium carbonate 500 MG chewable tablet  · levothyroxine 125 MCG tablet  · metoprolol succinate 25 MG extended release tablet  · pantoprazole 40 MG tablet         Send Copies to: Pilar Washington DO,     Patient Instructions:   · Activity: activity as tolerated  · Diet: cardiac diet  · Follow up with Pilar Washington DO in 1-2 weeks   · Follow-up with Cardiology/ in 1-2 weeks as directed    Total time spent on discharge services: 35 minutes  Including the following activities:  · Evaluation and Management of patient  · Discussion with patient and/or surrogate about current care plan  · Coordination with Case Management and/or   · Coordination of care with Consultants (if applicable)   · Coordination of care with Receiving Facility Physician (if applicable)  · Completion of DME forms (if applicable)  · Preparation of Discharge Summary  · Preparation of Medication Reconciliation  · Preparation of Discharge Prescriptions    Please note that this chart was generated using voice recognition Dragon dictation software. Although every effort was made to ensure the accuracy of this automated transcription, some errors in transcription may have occurred.     Diamond Mahoney MD  5/29/2022 1:48 PM

## 2022-05-29 NOTE — PROGRESS NOTES
[facility]  Inpatient/Observation/Outpatient Rehabilitation    Date: 2022  Patient Name: Marie Guerrero       [x] Inpatient Acute/Observation   : 1960     [x] Pt cancelled due to:  [] No Reason Given   [] Sick/ill   [x] Other:  Pt has been ambulating the halls independently and does not require skilled PT at this time per RN.         Edil Vieira, PT, DPT Date: 2022

## 2022-05-29 NOTE — PROGRESS NOTES
[address] [facility]    Progress Note    Date:   5/29/2022  Patient name:  Paulino Ahumada  Date of admission:  5/28/2022  5:04 AM  MRN:   [id number]  YOB: 1960    SUBJECTIVE/Last 24 hours update:     Patient seen and examined at the bed side , no new acute events overnight except for some heartburn which resolved with using tums and no new complains. Patient states she feels back to her baseline currently. She is ambulating in the hallway without any CP/pressure, discomfort, shortness of breath. She plans to see Cardiology this upcoming week. Discussed management of her thyroid disease and that she does need dose adjustment. Started on 125mcg, plans to recheck TSH in 6-8 week's time. Notes from nursing staff and Consults had been reviewed, and the overnight progress had been checked with the nursing staff as well. VSS, Afebrile, BP improved.     REVIEW OF SYSTEMS:      CONSTITUTIONAL:  no fevers, no headcahes  EYES: negative for blury vision  HEENT: No headaches, No nasal congestion, no difficulty swallowing  RESPIRATORY:negative for dyspnea, no wheezing, no Cough  CARDIOVASCULAR: negative for chest pain, no palpitations  GASTROINTESTINAL: no nausea, no vomiting, no change in bowel habits, no abdominal pain   GENITOURINARY: negative for dysuria, no hematuria   MUSCULOSKELETAL: no joint pains, no muscle aches, no swelling of joints or extremities  NEUROLOGICAL: No  Weakness or numbness    PAST MEDICAL HISTORY:      has a past medical history of Alveolar hypoventilation, Dermatitis herpetiformis, GERD (gastroesophageal reflux disease), Gout, Hearing loss, Hyperlipidemia, Hypertension, Hypothyroidism, Morbid obesity (HCC), Multiple sclerosis ([address] Neuropathy, MINH (obstructive sleep apnea), PAD (peripheral artery disease) ([address] SVT (supraventricular tachycardia) ([address] Type II or unspecified type diabetes mellitus without mention of complication, not stated as uncontrolled, and Vertigo. PAST SURGICAL HISTORY:      has a past surgical history that includes Hysterectomy (2006);  section; Carpal tunnel release; Dilation and curettage of uterus; Cervical disc surgery; Cardiac catheterization (Left, 2018); and Coronary angioplasty with stent (2019). SOCIAL HISTORY:      reports that she has never smoked. She has never used smokeless tobacco. She reports that she does not drink alcohol and does not use drugs. TOBACCO:   reports that she has never smoked. She has never used smokeless tobacco.  ETOH:   reports no history of alcohol use. Reviewed and non-contributory or as noted above and/or in the HPI    FAMILY HISTORY:     family history includes Colon Cancer in her paternal grandmother; Colon Polyps in an other family member; Diabetes in her maternal grandfather, maternal grandmother, and mother; Heart Disease in her father; High Cholesterol in her mother; Hypertension in her father and mother; Mult Sclerosis in her sister; Other in her father and paternal grandfather. Problem Relation Age of Onset    Diabetes Mother     High Cholesterol Mother     Hypertension Mother     Heart Disease Father     Hypertension Father     Other Father         abdominal aneurysm    Mult Sclerosis Sister     Diabetes Maternal Grandmother     Diabetes Maternal Grandfather     Colon Cancer Paternal Grandmother     Other Paternal Grandfather         brain aneurysm    Colon Polyps Other         Aunts with colon polyps     Reviewed and non-contributory or as noted above and/or in the HPI    HOME MEDICATIONS:      Prior to Admission medications    Medication Sig Start Date End Date Taking?  Authorizing Provider   calcium carbonate (TUMS) 500 MG chewable tablet Take 1 tablet by mouth 3 times daily as needed for Heartburn 22 Yes Bennett Contreras MD   levothyroxine (SYNTHROID) 125 MCG tablet Take 1 tablet by mouth Daily 22 Yes Alice Richardson MD   metoprolol succinate (TOPROL XL) 25 MG extended release tablet Take 1 tablet by mouth daily 5/30/22  Yes Alice Richardson MD   pantoprazole (PROTONIX) 40 MG tablet Take 1 tablet by mouth every morning (before breakfast) 5/30/22  Yes Alice Richardosn MD   vitamin C (ASCORBIC ACID) 500 MG tablet Take 500 mg by mouth daily    Historical Provider, MD   meclizine (ANTIVERT) 25 MG tablet Take 1 tablet by mouth 3 times daily as needed for Dizziness 4/22/21   Nathaniel Kovacs MD   ondansetron (ZOFRAN ODT) 4 MG disintegrating tablet Take 1 tablet by mouth every 8 hours as needed for Nausea 3/30/21   Jessie Alejandro MD   baclofen (LIORESAL) 20 MG tablet Take 1 tablet by mouth 4 times daily 3/21/21   Jesus Connell MD   ondansetron (ZOFRAN ODT) 4 MG disintegrating tablet Take 1 tablet by mouth every 8 hours as needed for Nausea or Vomiting 3/21/21   Jesus Connell MD   NONFORMULARY Take 1 tablet by mouth daily OTC Potassium supplement    Historical Provider, MD   vitamin D-3 (CHOLECALCIFEROL) 5000 units TABS Take 1,500 Units by mouth every 7 days Takes 1500mg on Mondays    Historical Provider, MD   clopidogrel (PLAVIX) 75 MG tablet Take 1 tablet by mouth daily 12/9/18   Schuyler Yanes MD   metformin (GLUCOPHAGE) 1000 MG tablet Take 1 tablet by mouth daily (with breakfast).   Patient taking differently: Take 500 mg by mouth 3 times daily (before meals) Indications: resume after repeating bun/cr if normal  3/20/13   Syliva Files, APRN - CNP   Magnesium 250 MG TABS Take 250 mg by mouth every morning     Historical Provider, MD       ALLERGIES:     Bee venom, Codeine, Erythromycin, Janumet [sitagliptin-metformin hcl er], and Other      OBJECTIVE:       Vitals:    05/28/22 2329 05/29/22 0442 05/29/22 0538 05/29/22 0704   BP: (!) 161/93  137/65 (!) 142/83   Pulse: 71  63 72   Resp: 20   16   Temp: 96.8 °F (36 °C)   97.2 °F (36.2 °C)   TempSrc: Temporal   Temporal   SpO2: 96%   97%   Weight:  195 lb (88.5 kg) Height:  5' 5\" (1.651 m)           Intake/Output Summary (Last 24 hours) at 5/29/2022 1134  Last data filed at 5/29/2022 0442  Gross per 24 hour   Intake 640 ml   Output --   Net 640 ml       PHYSICAL EXAM:  General Appearance  Alert , awake , not in acute distress  HEENT - Head is normocephalic, atraumatic. Lungs - Bilateral equal air entry , no wheezes, rales or rhonchi, aeration good  Cardiovascular - Heart sounds are normal.  Regular rhythm, normal rate without murmur, gallop or rub.   Abdomen - Soft, nontender, nondistended, no masses or organomegaly  Neurologic - There are no new focal motor or sensory deficits  Skin - No bruising or bleeding on exposed skin area  Extremities - No cyanosis, clubbing or edema    DIAGNOSTICS:     Laboratory Testing:    Recent Results (from the past 24 hour(s))   EKG 12 Lead    Collection Time: 05/28/22  1:11 PM   Result Value Ref Range    Ventricular Rate 73 BPM    Atrial Rate 73 BPM    P-R Interval 128 ms    QRS Duration 88 ms    Q-T Interval 394 ms    QTc Calculation (Bazett) 434 ms    P Axis 43 degrees    R Axis 18 degrees    T Axis 17 degrees   Troponin    Collection Time: 05/28/22  2:15 PM   Result Value Ref Range    Troponin, High Sensitivity 9 0 - 14 ng/L   EKG 12 Lead    Collection Time: 05/29/22  4:43 AM   Result Value Ref Range    Ventricular Rate 65 BPM    Atrial Rate 65 BPM    P-R Interval 134 ms    QRS Duration 88 ms    Q-T Interval 408 ms    QTc Calculation (Bazett) 424 ms    P Axis 31 degrees    R Axis 12 degrees    T Axis 22 degrees   Basic Metabolic Panel w/ Reflex to MG    Collection Time: 05/29/22  7:10 AM   Result Value Ref Range    Glucose 240 (H) 70 - 99 mg/dL    BUN 16 8 - 23 mg/dL    CREATININE 0.92 (H) 0.50 - 0.90 mg/dL    Bun/Cre Ratio 17 9 - 20    Calcium 9.6 8.6 - 10.4 mg/dL    Sodium 137 135 - 144 mmol/L    Potassium 4.2 3.7 - 5.3 mmol/L    Chloride 98 98 - 107 mmol/L    CO2 28 20 - 31 mmol/L    Anion Gap 11 9 - 17 mmol/L    GFR Non-African American >60 >60 mL/min    GFR African American >60 >60 mL/min    GFR Comment          GFR Staging         CBC auto differential    Collection Time: 05/29/22  7:10 AM   Result Value Ref Range    WBC 6.5 3.5 - 11.3 k/uL    RBC 4.52 3.95 - 5.11 m/uL    Hemoglobin 12.9 11.9 - 15.1 g/dL    Hematocrit 40.0 36.3 - 47.1 %    MCV 88.5 82.6 - 102.9 fL    MCH 28.5 25.2 - 33.5 pg    MCHC 32.3 28.4 - 34.8 g/dL    RDW 14.1 11.8 - 14.4 %    Platelets 488 426 - 333 k/uL    MPV 12.0 8.1 - 13.5 fL    NRBC Automated 0.0 0.0 per 100 WBC    Seg Neutrophils 66 (H) 36 - 65 %    Lymphocytes 18 (L) 24 - 43 %    Monocytes 8 3 - 12 %    Eosinophils % 6 (H) 1 - 4 %    Basophils 2 0 - 2 %    Immature Granulocytes 0 0 %    Segs Absolute 4.25 1.50 - 8.10 k/uL    Absolute Lymph # 1.16 1.10 - 3.70 k/uL    Absolute Mono # 0.54 0.10 - 1.20 k/uL    Absolute Eos # 0.38 0.00 - 0.44 k/uL    Basophils Absolute 0.10 0.00 - 0.20 k/uL    Absolute Immature Granulocyte <0.03 0.00 - 0.30 k/uL   Troponin    Collection Time: 05/29/22  7:10 AM   Result Value Ref Range    Troponin, High Sensitivity 9 0 - 14 ng/L       Current Facility-Administered Medications   Medication Dose Route Frequency Provider Last Rate Last Admin    calcium carbonate (TUMS) chewable tablet 500 mg  500 mg Oral TID PRN Barbara Young MD   500 mg at 05/29/22 0018    baclofen (LIORESAL) tablet 20 mg  20 mg Oral 4x Daily PRN Barbara Young MD   20 mg at 05/28/22 2058    clopidogrel (PLAVIX) tablet 75 mg  75 mg Oral Daily Barbara Young MD   75 mg at 05/29/22 0859    magnesium oxide (MAG-OX) tablet 400 mg  400 mg Oral QAM Barbara Young MD   400 mg at 05/29/22 0859    meclizine (ANTIVERT) tablet 25 mg  25 mg Oral TID PRN Barbara Young MD        metFORMIN (GLUCOPHAGE) tablet 500 mg  500 mg Oral TID AC Barbara Young MD   500 mg at 05/29/22 0709    ondansetron (ZOFRAN-ODT) disintegrating tablet 4 mg  4 mg Oral Q8H PRN Barbara Young MD        ascorbic acid (VITAMIN C) tablet 500 mg  500 mg Oral Daily Cholo Kraft MD   500 mg at 05/29/22 0859    sodium chloride flush 0.9 % injection 5-40 mL  5-40 mL IntraVENous 2 times per day Cholo Kraft MD   10 mL at 05/28/22 2058    sodium chloride flush 0.9 % injection 5-40 mL  5-40 mL IntraVENous PRN Cholo Kraft MD        0.9 % sodium chloride infusion  25 mL IntraVENous PRN Cholo Kraft MD        enoxaparin (LOVENOX) injection 40 mg  40 mg SubCUTAneous Daily Cholo Kraft MD   40 mg at 05/28/22 1459    acetaminophen (TYLENOL) tablet 650 mg  650 mg Oral Q6H PRN Cholo Kraft MD        Or   Price acetaminophen (TYLENOL) suppository 650 mg  650 mg Rectal Q6H PRN Cholo Kraft MD        labetalol (NORMODYNE;TRANDATE) injection 10 mg  10 mg IntraVENous Q4H PRN Cholo Kraft MD        metoprolol succinate (TOPROL XL) extended release tablet 25 mg  25 mg Oral Daily Cholo Kraft MD   25 mg at 05/29/22 0859    pantoprazole (PROTONIX) tablet 40 mg  40 mg Oral QAM AC Cholo Kraft MD   40 mg at 05/29/22 0709     Current Outpatient Medications   Medication Sig Dispense Refill    calcium carbonate (TUMS) 500 MG chewable tablet Take 1 tablet by mouth 3 times daily as needed for Heartburn 90 tablet 0    levothyroxine (SYNTHROID) 125 MCG tablet Take 1 tablet by mouth Daily 30 tablet 0    [START ON 5/30/2022] metoprolol succinate (TOPROL XL) 25 MG extended release tablet Take 1 tablet by mouth daily 30 tablet 0    [START ON 5/30/2022] pantoprazole (PROTONIX) 40 MG tablet Take 1 tablet by mouth every morning (before breakfast) 30 tablet 0    vitamin C (ASCORBIC ACID) 500 MG tablet Take 500 mg by mouth daily      meclizine (ANTIVERT) 25 MG tablet Take 1 tablet by mouth 3 times daily as needed for Dizziness 20 tablet 0    ondansetron (ZOFRAN ODT) 4 MG disintegrating tablet Take 1 tablet by mouth every 8 hours as needed for Nausea 20 tablet 0    baclofen (LIORESAL) 20 MG tablet Take 1 tablet by mouth 4 times daily 90 tablet 0    ondansetron (ZOFRAN ODT) 4 MG disintegrating tablet Take 1 tablet by mouth every 8 hours as needed for Nausea or Vomiting 15 tablet 0    NONFORMULARY Take 1 tablet by mouth daily OTC Potassium supplement      vitamin D-3 (CHOLECALCIFEROL) 5000 units TABS Take 1,500 Units by mouth every 7 days Takes 1500mg on Mondays      clopidogrel (PLAVIX) 75 MG tablet Take 1 tablet by mouth daily 30 tablet 3    metformin (GLUCOPHAGE) 1000 MG tablet Take 1 tablet by mouth daily (with breakfast). (Patient taking differently: Take 500 mg by mouth 3 times daily (before meals) Indications: resume after repeating bun/cr if normal ) 180 tablet 3    Magnesium 250 MG TABS Take 250 mg by mouth every morning          ASSESSMENT:     Principal Problem:    Hypertensive urgency  Active Problems:    Elevated troponin    Acute electrocardiogram changes    MINH (obstructive sleep apnea)    Dyslipidemia    HTN (hypertension)    Obesity (BMI 30-39. 9)    MS (multiple sclerosis) ([address]    Type 2 diabetes mellitus without complication, without long-term current use of insulin ([address]    Chest pain  Resolved Problems:    * No resolved hospital problems. *      PLAN:     Discussed care plan with nurse after getting their input. HTN Urgency, Chest Pain  Repeat troponin and EKG in a.m were reviewed. NSR. Monitor CBC, BMP  Continue with Toprol, Plavix  Protonix daily for GERD/heartburn, Tums PRN  Continue with home meds of metformin, Antivert, baclofen as needed  Continue with supplementation including vitamin C and magnesium  Lovenox for DVT prophylaxis    Plan to D/C home today with close OP f/u     Above plan discussed with the patient who agreed to the above plan     Please note that this chart was generated using voice recognition Dragon dictation software. Although every effort was made to ensure the accuracy of this automated transcription, some errors in transcription may have occurred.     Arik Hardin MD  5/29/2022 11:34 AM

## 2022-05-29 NOTE — PROGRESS NOTES
Writer at bedside at this time to perform second shift assessment. Patient is lying in bed at this time. Vital signs taken and assessment completed at this time. BP higher than initial assessment, will recheck in a couple of hours. Patient asked for another blanket as she was cold, warm blanket provided to patient. Patient denies any further needs at this time. Call light within reach, will continue to monitor.

## 2022-05-29 NOTE — PROGRESS NOTES
Patient previously walking in halls and came back to room for vitals and assessment/med pass. Vitals stable and WDL. Patient denies any needs or concerns. Assessment negative. Blood drawn from IV for morning labs. Medications administered. Patient instructed to notify staff for any needs. Patient resumed walking in halls.

## 2022-05-29 NOTE — PROGRESS NOTES
[facility]  Inpatient/Observation/Outpatient Rehabilitation    Date: 2022  Patient Name: Marisela Quigley       [x] Inpatient Acute/Observation       []  Outpatient  : 1960       [] Pt no showed for scheduled appointment    [] Pt refused/declined therapy at this time due to:           [x] Pt cancelled due to:  [] No Reason Given   [] Sick/ill   [x] Other: Per RN, patient doing well, is Independent and requires no skilled OT followup.         Bernice Najjar, OT Date: 2022

## 2022-05-29 NOTE — PROGRESS NOTES
IV's removed for discharge to home. Catheter tips remained intact. Dressing applied bilaterally. Discharge instructions provided to patient. Patient left facility at this time with spouse for private transportation.

## 2022-06-27 PROBLEM — R77.8 ELEVATED TROPONIN: Status: RESOLVED | Noted: 2022-05-28 | Resolved: 2022-06-27
